# Patient Record
Sex: MALE | Race: WHITE | Employment: OTHER | URBAN - NONMETROPOLITAN AREA
[De-identification: names, ages, dates, MRNs, and addresses within clinical notes are randomized per-mention and may not be internally consistent; named-entity substitution may affect disease eponyms.]

---

## 2022-04-08 ENCOUNTER — OFFICE VISIT (OUTPATIENT)
Dept: ORTHOPEDIC SURGERY | Age: 67
End: 2022-04-08
Payer: MEDICARE

## 2022-04-08 VITALS
DIASTOLIC BLOOD PRESSURE: 96 MMHG | HEIGHT: 68 IN | SYSTOLIC BLOOD PRESSURE: 166 MMHG | WEIGHT: 255 LBS | BODY MASS INDEX: 38.65 KG/M2

## 2022-04-08 DIAGNOSIS — M25.562 LEFT KNEE PAIN, UNSPECIFIED CHRONICITY: ICD-10-CM

## 2022-04-08 DIAGNOSIS — M17.12 PRIMARY OSTEOARTHRITIS OF LEFT KNEE: Primary | ICD-10-CM

## 2022-04-08 PROCEDURE — 99203 OFFICE O/P NEW LOW 30 MIN: CPT | Performed by: PHYSICIAN ASSISTANT

## 2022-04-08 RX ORDER — AMLODIPINE BESYLATE 10 MG/1
10 TABLET ORAL DAILY
COMMUNITY
Start: 2022-01-06

## 2022-04-08 RX ORDER — CELECOXIB 200 MG/1
200 CAPSULE ORAL 2 TIMES DAILY PRN
Qty: 60 CAPSULE | Refills: 0 | Status: SHIPPED
Start: 2022-04-08 | End: 2022-05-05

## 2022-04-08 RX ORDER — OMEPRAZOLE 20 MG/1
20 CAPSULE, DELAYED RELEASE ORAL DAILY
COMMUNITY
End: 2022-10-17

## 2022-04-08 RX ORDER — VENLAFAXINE HYDROCHLORIDE 150 MG/1
150 CAPSULE, EXTENDED RELEASE ORAL DAILY
COMMUNITY
Start: 2022-02-18

## 2022-04-08 NOTE — PROGRESS NOTES
[address] [facility],[address] In Care  New Patient Note      CHIEF COMPLAINT:   Chief Complaint   Patient presents with    Leg Pain     Pt presents this PM with lateral leg pain that wraps anteriorly, as well as calf pain. States that pain is constant, but does not extend all the way to the ankle. Describes pain as \"constant shin splint\". HISTORY OF PRESENT ILLNESS:                The patient is a 77 y.o. male who presents today with with left knee pain that started 2 weeks ago upon waking. He denies any new activities or injuries prior to the start of the symptoms. He localizes pain to the lateral knee, shin, lower leg. He states his pain is worse when he goes from sit to stand, stairs, and upon waking in the morning. He states once he gets out of bed and starts walking the pain starts and does not let up throughout the day. Denies any numbness or tingling or loss of sensation in the lower leg. He denies calf pain. He has a history of a right total knee replacement and admits this is how the right knee felt prior to his replacement. He has not tried any home therapies of over-the-counter medications, ice, or had any formal physical therapy or injections for this knee. In the past he has tried Mobic, as well as over-the-counter anti-inflammatories which upset his stomach. Past Medical History:        Diagnosis Date    Arthritis     Osteoarthritis      Past Surgical History:        Procedure Laterality Date    JOINT REPLACEMENT      R TKA    SHOULDER SURGERY       Current Medications:   No current facility-administered medications for this visit. Allergies:  Patient has no known allergies. Social History:   TOBACCO:   reports that he has never smoked. He has never used smokeless tobacco.  ETOH:   reports previous alcohol use. DRUGS:   reports no history of drug use.   OCCUPATION: retired    Review of Systems   Constitutional: Negative for fever, chills, diaphoresis, appetite change and fatigue. HENT: Negative for dental issues, hearing loss and tinnitus. Negative for congestion, sinus pressure, sneezing, sore throat. Negative for headache. Eyes: Negative for visual disturbance, blurred and double vision. Negative for pain, discharge, redness and itching  Respiratory: Negative for cough, shortness of breath and wheezing. Cardiovascular: Negative for chest pain, palpitations and leg swelling. No dyspnea on exertion   Gastrointestinal:   Negative for nausea, vomiting, abdominal pain, diarrhea, constipation  or black or bloody. Hematologic\Lymphatic:  negative for bleeding, petechiae,   Genitourinary: Negative for hematuria and difficulty urinating. Musculoskeletal: Negative for neck pain and stiffness. Negative for back pain, joint swelling. + Right knee pain, antalgic gait  Skin: Negative for pallor, rash and wound. Neurological: Negative for dizziness, tremors, seizures, weakness, light-headedness, no TIA or stroke symptoms. No numbness and headaches. Psychiatric/Behavioral: Negative.      Physical Examination:   General appearance: alert, well appearing, and in no distress,  normal appearing weight  Mental status: alert, oriented to person, place, and time, normal mood, behavior, speech, dress, motor activity, and thought processes  Resp:   resp easy and unlabored, no audible wheezes note  Cardiac: distal pulses palpable, skin well perfused  Neurological: alert, oriented X3, normal speech, no focal findings or movement disorder noted, motor and sensory grossly normal bilaterally, normal muscle tone  HEENT: normochephalic atraumatic, external ears and eyes normal, sclera normal, neck supple  Extremities:   peripheral pulses normal, no edema, redness or tenderness in the calves   Skin: normal coloration, no rashes or open wounds, no suspicious skin lesions noted  Psych: Affect euthymic   Musculoskeletal:   On visual inspection there is no obvious deformity of the right knee, edema, erythema, ecchymosis. He is tender to palpation over the lateral joint line, the fibular head. No tenderness to palpation on the medial aspect of the knee, patella tendon, or calf. There is no decreased sensation with light touch throughout the right lower extremity. Patient is grossly neurovascularly intact. Left knee: Active range of motion flexion 90 with pain, extension 5 with pain. Manual muscle testing 5/5 with extension/flexion. (-) anterior/posterior drawer, (-) Lachman, (-) varus/valgus stress test, (+) Octavio, (-) Vazquez, (-) Homans' sign    BP (!) 166/96   Ht 5' 8\" (1.727 m)   Wt 255 lb (115.7 kg) Comment: per patient  BMI 38.77 kg/m²      XR: 4 view left knee x-rays were obtained in the clinic today which show tricompartment osteoarthritis of the left knee    The imaging will be reviewed and interpreted by Radiologist.  The report was not complete at the time of this note. Please refer to Radiologist report for their interpretation. ASSESSMENT:   Diagnosis Orders   1. Left knee pain, unspecified chronicity  XR KNEE LEFT (MIN 4 VIEWS)       PLAN:  This is a 70-year-old male who presents to the office today for evaluation of left knee pain that began 2 weeks ago and has been progressively worsening. On exam he is tender to palpation along the lateral joint line and has positive Octavio. We did obtain 4 view knee x-rays in the office today which show tricompartment osteoarthritis. We discussed treatment options for this including over-the-counter medications, injections, activity modifications, physical therapy. Ultimately at some point he will need a left total knee replacement. He would like to try medication management and a home exercise program at this time.   I recommended Celebrex 200 mg 1 tablet up to twice daily as needed for pain with GI precautions as he has had GI problems with meloxicam as well as over-the-counter anti-inflammatories in the past..  I did advise him while he is on the Celebrex he should not use any other nonsteroidal anti-inflammatory. He can use Tylenol as needed for discomfort in addition to the Celebrex. If he develops any GI upset, nausea, vomiting, change in appetite, blood in stools he will discontinue the medication immediately and contact the office. I also provided him with a home exercise program today for knee arthritis. He has had cortisone injections in the right knee prior to his replacement which did help. He will call our office if he wants to proceed with an intra-articular cortisone injection. Patient voiced understanding and agrees with the treatment plan outlined for him in the office today. He will call us with any additional questions or concerns he may have. We will plan to see him back as needed for the left knee. Electronically signed by Zi Asencio PA-C on 4/8/22 at 2:41 PM EDT    **This report was transcribed using voice recognition software. Every effort was made to ensure accuracy; however, inadvertent computerized transcription errors may be present. **

## 2022-04-08 NOTE — PATIENT INSTRUCTIONS
Patient Education        Knee Arthritis: Exercises  Introduction  Here are some examples of exercises for you to try. The exercises may be suggested for a condition or for rehabilitation. Start each exercise slowly. Ease off the exercises if you start to have pain. You will be told when to start these exercises and which ones will work bestfor you. How to do the exercises  Knee flexion with heel slide    1. Lie on your back with your knees bent. 2. Slide your heel back by bending your affected knee as far as you can. Then hook your other foot around your ankle to help pull your heel even farther back. 3. Hold for about 6 seconds, then rest for up to 10 seconds. 4. Repeat 8 to 12 times. 5. Switch legs and repeat steps 1 through 4, even if only one knee is sore. Quad sets    1. Sit with your affected leg straight and supported on the floor or a firm bed. Place a small, rolled-up towel under your knee. Your other leg should be bent, with that foot flat on the floor. 2. Tighten the thigh muscles of your affected leg by pressing the back of your knee down into the towel. 3. Hold for about 6 seconds, then rest for up to 10 seconds. 4. Repeat 8 to 12 times. 5. Switch legs and repeat steps 1 through 4, even if only one knee is sore. Straight-leg raises to the front    1. Lie on your back with your good knee bent so that your foot rests flat on the floor. Your affected leg should be straight. Make sure that your low back has a normal curve. You should be able to slip your hand in between the floor and the small of your back, with your palm touching the floor and your back touching the back of your hand. 2. Tighten the thigh muscles in your affected leg by pressing the back of your knee flat down to the floor. Hold your knee straight. 3. Keeping the thigh muscles tight and your leg straight, lift your affected leg up so that your heel is about 12 inches off the floor.  Hold for about 6 seconds, then lower slowly. 4. Relax for up to 10 seconds between repetitions. 5. Repeat 8 to 12 times. 6. Switch legs and repeat steps 1 through 5, even if only one knee is sore. Active knee flexion    1. Lie on your stomach with your knees straight. If your kneecap is uncomfortable, roll up a washcloth and put it under your leg just above your kneecap. 2. Lift the foot of your affected leg by bending the knee so that you bring the foot up toward your buttock. If this motion hurts, try it without bending your knee quite as far. This may help you avoid any painful motion. 3. Slowly move your leg up and down. 4. Repeat 8 to 12 times. 5. Switch legs and repeat steps 1 through 4, even if only one knee is sore. Quadriceps stretch (facedown)    1. Lie flat on your stomach, and rest your face on the floor. 2. Wrap a towel or belt strap around the lower part of your affected leg. Then use the towel or belt strap to slowly pull your heel toward your buttock until you feel a stretch. 3. Hold for about 15 to 30 seconds, then relax your leg against the towel or belt strap. 4. Repeat 2 to 4 times. 5. Switch legs and repeat steps 1 through 4, even if only one knee is sore. Stationary exercise bike    1. If you do not have a stationary exercise bike at home, you can find one to ride at your local health club or community center. 2. Adjust the height of the bike seat so that your knee is slightly bent when your leg is extended downward. If your knee hurts when the pedal reaches the top, you can raise the seat so that your knee does not bend as much. 3. Start slowly. At first, try to do 5 to 10 minutes of cycling with little to no resistance. Then increase your time and the resistance bit by bit until you can do 20 to 30 minutes without pain. 4. If you start to have pain, rest your knee until your pain gets back to the level that is normal for you. Or cycle for less time or with less effort.   Follow-up care is a key part of your treatment and safety. Be sure to make and go to all appointments, and call your doctor if you are having problems. It's also a good idea to know your test results and keep alist of the medicines you take. Where can you learn more? Go to https://ruddy.[employer]. org and sign in to your [employer] account. Enter C159 in the [employer] box to learn more about \"Knee Arthritis: Exercises. \"     If you do not have an account, please click on the \"Sign Up Now\" link. Current as of: July 1, 2021               Content Version: 13.2  © 2006-2022 Healthwise, Incorporated. Care instructions adapted under license by [facility] ([facility]). If you have questions about a medical condition or this instruction, always ask your healthcare professional. [address] any warranty or liability for your use of this information.

## 2022-04-26 ENCOUNTER — TELEPHONE (OUTPATIENT)
Dept: ORTHOPEDIC SURGERY | Age: 67
End: 2022-04-26

## 2022-04-26 ENCOUNTER — OFFICE VISIT (OUTPATIENT)
Dept: ORTHOPEDIC SURGERY | Age: 67
End: 2022-04-26
Payer: MEDICARE

## 2022-04-26 VITALS
WEIGHT: 255 LBS | BODY MASS INDEX: 38.65 KG/M2 | HEIGHT: 68 IN | DIASTOLIC BLOOD PRESSURE: 90 MMHG | SYSTOLIC BLOOD PRESSURE: 172 MMHG

## 2022-04-26 DIAGNOSIS — M25.521 PAIN OF BOTH ELBOWS: ICD-10-CM

## 2022-04-26 DIAGNOSIS — M54.12 CERVICAL RADICULOPATHY: ICD-10-CM

## 2022-04-26 DIAGNOSIS — M17.12 PRIMARY OSTEOARTHRITIS OF LEFT KNEE: Primary | ICD-10-CM

## 2022-04-26 DIAGNOSIS — M25.522 PAIN OF BOTH ELBOWS: ICD-10-CM

## 2022-04-26 PROCEDURE — 20610 DRAIN/INJ JOINT/BURSA W/O US: CPT | Performed by: PHYSICIAN ASSISTANT

## 2022-04-26 PROCEDURE — 99213 OFFICE O/P EST LOW 20 MIN: CPT | Performed by: PHYSICIAN ASSISTANT

## 2022-04-26 RX ORDER — LIDOCAINE HYDROCHLORIDE 10 MG/ML
5 INJECTION, SOLUTION INFILTRATION; PERINEURAL ONCE
Status: COMPLETED | OUTPATIENT
Start: 2022-04-26 | End: 2022-04-26

## 2022-04-26 RX ORDER — TRIAMCINOLONE ACETONIDE 40 MG/ML
40 INJECTION, SUSPENSION INTRA-ARTICULAR; INTRAMUSCULAR ONCE
Status: COMPLETED | OUTPATIENT
Start: 2022-04-26 | End: 2022-04-26

## 2022-04-26 RX ADMIN — LIDOCAINE HYDROCHLORIDE 5 ML: 10 INJECTION, SOLUTION INFILTRATION; PERINEURAL at 15:10

## 2022-04-26 RX ADMIN — TRIAMCINOLONE ACETONIDE 40 MG: 40 INJECTION, SUSPENSION INTRA-ARTICULAR; INTRAMUSCULAR at 15:12

## 2022-04-26 NOTE — PATIENT INSTRUCTIONS
Patient Education        Joint Injections: Care Instructions  Your Care Instructions     Joint injections are shots into a joint, such as the knee. They may be used toput in medicines, such as pain relievers. A corticosteroid, or steroid, shot is used to reduce inflammation in tendons or joints. It is often used to treat problems such as arthritis, tendinitis, andbursitis. Steroids can be injected directly into a painful, inflamed joint. They can also help reduce inflammation of a bursa. A bursa is a sac of fluid. It cushions and lubricates areas where tendons, ligaments, skin, muscles, or bones rub againsteach other. A steroid shot can sometimes help with short-term pain relief when other treatments haven't worked. If steroid shots help, pain may improve for weeks ormonths. Follow-up care is a key part of your treatment and safety. Be sure to make and go to all appointments, and call your doctor if you are having problems. It's also a good idea to know your test results and keep alist of the medicines you take. How can you care for yourself at home?  Put ice or a cold pack on the area for 10 to 20 minutes at a time. Put a thin cloth between the ice and your skin.  Ask your doctor if you can take an over-the-counter pain medicine, such as acetaminophen (Tylenol), ibuprofen (Advil, Motrin), or naproxen (Aleve). Be safe with medicines. Read and follow all instructions on the label.  Avoid strenuous activities for several days. In particular, avoid ones that put stress on the area where you got the shot.  If you have dressings over the area, keep them clean and dry. You may remove them when your doctor tells you to. When should you call for help? Call your doctor now or seek immediate medical care if:     You have signs of infection, such as:  ? Increased pain, swelling, warmth, or redness. ? Red streaks leading from the site. ? Pus draining from the site. ? A fever.    Watch closely for changes in your health, and be sure to contact your doctor ifyou have any problems. Where can you learn more? Go to https://chpepiceweb.[employer]. org and sign in to your inDinero account. Enter N616 in the [employer] box to learn more about \"Joint Injections: Care Instructions. \"     If you do not have an account, please click on the \"Sign Up Now\" link. Current as of: July 1, 2021               Content Version: 13.2  © 2006-2022 Healthwise, Incorporated. Care instructions adapted under license by [facility] [address] If you have questions about a medical condition or this instruction, always ask your healthcare professional. [address] any warranty or liability for your use of this information.

## 2022-04-26 NOTE — PROGRESS NOTES
Established Patient Follow Up  [address] [facility]    Chief Complaint   Patient presents with    Knee Pain     The pt presents this PM with L knee pain that he was seen in the office for a few weeks ago. States that L knee pain is persisting. Had been taking Celebrex, but it was hard on his stomach. As a result, he states that he stopped taking it. Pt is hoping to receive an injection in the knee today. Subjective:  Pt is a 77 y.o. male, established pt who presents today for follow up of left knee pain. Please refer to the last clinic note from 4/8/22 as none of the patient's past medical hx has changed. Pt reports he has been consistent with the celebrex that was prescribed at the last visit and has not noticed any change in symptoms. He continues to have left knee pain. We did discuss possible intra-articular cortisone injection at his last visit if the celebrex did not help. He would like to proceed with the injection today. He states he was at his PCP in [facility], yesterday for his annual physical.  He has had trouble with bilateral UE numbness for several years as well as elbow pain. His PCP recommended EMG for the numbness in the UE and PT for the bilateral elbow pain. His PCP requests that we put these referrals in as since he is out of the area. Objective:  Vitals:    04/26/22 1441   BP: (!) 172/90     Pt is alert and oriented x 3, NAD, sitting comfortable in the exam room today. TTP over medial and lateral joint line, antalgic gait. He continues to have limited ROM that is painful. No decreased sensation to light touch, he is neurovascularly intact. Radiology Imaging:  Pt had imaging on 4/8/22 which showed severe narrowing of the medial compartment in the left knee    Procedure:  Procedure Note: Knee Cortisone injection     The left knees were identified as the injection site.  The risk and benefits of a cortisone injection were explained and the patient consented to the injection. Under sterile conditions, each knee was injected with a mixture of 40 mg of Kenalog and 4 mL of 1% Lidocaine without complication. A sterile bandage was applied. Assessment:  Encounter Diagnoses   Name Primary?  Primary osteoarthritis of left knee Yes    Cervical radiculopathy     Pain of both elbows      Plan:  Pt returns to the office today requesting left intra-articular knee cortisone injection as he has not had any improvement on Celebrex. We did discuss the risks and benefits of the injection and he provided verbal consent for the procedure. I did explain that we now try to minimize the amount of cortisone injections we perform in a joint due to increasing risk of infection at the time of surgical intervention. Additionally, I reminded him that most joint surgeons would require him to wait at least 3-4 months after the injection to proceed with any surgical intervention. Pt voiced understanding and agrees with the plan. Since the Celebrex has not been helpful and has been causing GI upset, I recommended d/c at this time. He can use tylenol 500mg 2 tab q 8 hours PRN pain. If he has lasting relief with this injection, I would be comfortable doing one more before considering something such as Visco supplementation vs. TKA. He will call us with any additional questions or concerns. I will see him back in 3 months for f/u. If his pain returns prior to that, he can call the office and we would refer him to one of the total joint surgeons. I am happy to put a referral in to Dr. Kenia Cardoso for consult for assessment for EMG. He would like to do PT at [facility] in [facility] so a referral will be placed for bilateral elbow pain and tendonitis. Pt voiced understanding and agrees with the treatment plan outlined in the office for him today. He will call with any additional questions or concerns. I will see him back in 3 months for re-evaluation.   If his knee pain resolves, he can call the office and postpone the visit. Electronically signed by Elizabeth Jackson PA-C on 4/26/22 at 2:39 PM EDT    **This report was transcribed using voice recognition software. Every effort was made to ensure accuracy; however, inadvertent computerized transcription errors may be present. **

## 2022-04-26 NOTE — TELEPHONE ENCOUNTER
Pt called in today to report that the he has been taking Celebrex for the last 2 weeks and it is not helping his knee pain and it is upsetting his stomach. I advised him to d/c the Celebrex at this time. I did offer him to come back into the clinic and we can do a IA cortisone injection in his knee if he would like. He would like to proceed with knee injection and will return to the clinic at his convenience.

## 2022-05-05 ENCOUNTER — TELEPHONE (OUTPATIENT)
Dept: ORTHOPEDIC SURGERY | Age: 67
End: 2022-05-05

## 2022-05-05 DIAGNOSIS — M17.12 PRIMARY OSTEOARTHRITIS OF LEFT KNEE: ICD-10-CM

## 2022-05-05 RX ORDER — CELECOXIB 200 MG/1
CAPSULE ORAL
Qty: 60 CAPSULE | Refills: 0 | Status: SHIPPED
Start: 2022-05-05 | End: 2022-07-18

## 2022-05-05 NOTE — TELEPHONE ENCOUNTER
Pt was contacted on 5/5/2022 to inform him that a refill for Celebrex was sent to his pharmacy. Pt states that he will not be needing a refill of the medication, due to symptoms of upset stomach after taking it. Did state that his L knee symptoms have improved a great deal since visiting the office for injection.

## 2022-05-10 ENCOUNTER — EVALUATION (OUTPATIENT)
Dept: PHYSICAL THERAPY | Age: 67
End: 2022-05-10

## 2022-05-10 DIAGNOSIS — M25.521 PAIN OF BOTH ELBOWS: Primary | ICD-10-CM

## 2022-05-10 DIAGNOSIS — M25.522 PAIN OF BOTH ELBOWS: Primary | ICD-10-CM

## 2022-05-10 NOTE — PROGRESS NOTES
[facility] [facility]   Phone: [phone]             Fax: [phone]         Date:  5/10/2022   Patient: Jerzy Smith  : 1955  MRN: [id number]  Referring Provider: Manuel Mcginnis PA-C  [address] Diagnosis:   M25.521, M25.522 (ICD-10-CM) - Pain of both elbows      SUBJECTIVE:     Onset date: a month     Onset[de-identified] Sudden onset    Mechanism of Injury / History: pt reports was doing yard work, using the [employer] and suddenly had pain in both elbows R worse than L. Reports hasn't gone away since. Pt reports pain doing yard work. Reports pain attempting to carry in groceries and reaching [place]. Patient is right handed. Previous PT: none    Medical Management for Current Problem: none    Chief complaint: pain    Behavior: condition is getting worse    Pain:   Current: 2/10     Best: 2/10     Worst:8/10    Aggravated by: reaching overhead, lifting/carrying/material handling    Relieved by: rest    Symptom Type/Quality: sharp  Location[de-identified] B elbows        Imaging results: Xr Elbow Right (min 3 Views)    Result Date: 2020  Reading Location: 200 Exam: XR ELBOW RIGHT (MIN 3 VIEWS). Comparison: None. History: Epicondylitis. Technique: 3 views of the right elbow. Findings: There is no fracture or dislocation. No elevation of the anterior or posterior fat pad is identified to suggest occult fracture. No soft tissue abnormality. No fracture, dislocation or joint effusion.       Past Medical History:  Past Medical History:   Diagnosis Date    Encounter for screening colonoscopy 2017     Past Surgical History:   Procedure Laterality Date    COLONOSCOPY  2017       Medications:   Current Outpatient Medications   Medication Sig Dispense Refill    fluticasone-vilanterol (BREO ELLIPTA) 100-25 MCG/INH AEPB inhaler Inhale 1 puff into the lungs daily 30 each 2    albuterol sulfate  (90 Base) MCG/ACT inhaler Inhale 2 puffs into the lungs 4 times daily as needed for Wheezing 3 Inhaler 1    azelastine (ASTELIN) 0.1 % nasal spray 1 spray by Nasal route 2 times daily Use in each nostril as directed 2 Bottle 1    montelukast (SINGULAIR) 10 MG tablet Take 1 tablet by mouth nightly 30 tablet 3     No current facility-administered medications for this visit. Occupation: retired. Exercise regimen: light  weight training  before injury but not recently.      Hobbies: none    Patient Goals: pain relief    Precautions/Contraindications: none    OBJECTIVE:     Observations: well nourished male                  Palpation: Tender to palpation near medial epicondyle on L and R , Non-tender to palpation on lateral epicondyle      Joint/Motion:  Right Elbow:  AROM: 100° flexion, extension 0°  Right Wrist:   AROM: extension: 60°, flexion 60°, ulnar deviation: 15°, radial deviation: 12°    Left ELbow:  AROM: 120° flexion, extension 0°  Left Wrist:   AROM: extension: 75°, flexion 60°, ulnar deviation: 30°, radial deviation: 25°      Strength:  Right Elbow:Flexion 4-/5,  Extension 4/5  Right wrist: Flexion 4/5,  Extension4/5, radial deviation 4/5, ulnar deviation 4/5  Right limited by pain     Left Elbow:Flexion 4+/5,  Extension 4+/5  Left wrist: Flexion 5/5,  Extension5/5, radial deviation 5/5, ulnar deviation 5/5          Special Tests/Functional Screens:    [x] Resisted wrist flexion  R: +    L: +  [x] Resisted wrist extension R: +   L:-   [x] Passive wrist extension R:+   L:-  [x] Passive wrist flexion   R: -    L: -     Spurlings:  Negative     Special Tests:  pt demonstrates signs and symptoms of B epicondylitis     ASSESSMENT     Outcome Measure:   QuickDASH (Disorders of the Arm, Shoulder, and Hand) 22.73% disability    Problems:    Pain reported 2-8 /10   ROM decreased   Strength decreased   Decreased functional ability with reaching, lifting, carrying    Reason for Skilled Care: Pt presents to therapy due to pain in B elbows affecting daily activities. Pt will benefit from skilled PT to improve pain, ROM, strength and mobility. [x] There are no barriers affecting plan of care or recovery    [] Barriers to this patient's plan of care or recovery include. Domestic Concerns:  [x] No  [] Yes:    Short Term goals (2-3 weeks)   Decrease reported pain to 0-5/10   Increase ROM to elbow AROM: 115° flexion, extension 0°, wrist AROM: extension: 65°, flexion 65°, ulnar deviation: 20°, radial deviation: 15°   Increase Strength to 4+/5     Able to perform/complete the following functions/tasks: pt able to complete yard work 20+ minutes with min pain/limitation. Pt able to reach [place] 5x with min pain/limitation. Pt able to carry 5+ pounds at waist height for 50 ft with min pain/limitation.  QuickDASH (Disorders of the Arm, Shoulder, and Hand) 15% disability    Long Term goals (4-6 weeks)   Decrease reported pain to 0-3/10   Increase ROM to AROM: 125° flexion, extension 0°, wrist AROM: extension: 70°, flexion 70°, ulnar deviation: 30°, radial deviation: 20°   Increase Strength to 5/5    Able to perform/complete the following functions/tasks: pt able to complete yard work 40+ minutes with no pain/limitation. Pt able to reach [place] 10 x with no pain/limitation. Pt able to carry 10+ pounds at waist height for 100 ft with no pain/limitation.     QuickDASH 12% disability   Independent with Home Exercise Programs    Rehab Potential: [x] Good  [] Fair  [] Poor    PLAN       Treatment Plan:   [x] Therapeutic Exercise  [x] Therapeutic Activity  [x] Neuromuscular Re-education   [] Gait Training  [] Balance Training  [] Aerobic conditioning  [x] Manual Therapy  [x] Massage/Fascial release   [] Work/Sport specific activities    [] Pain Neuroscience [x] Cold/hotpack  [] Vasocompression  [x] Electrical Stimulation  [] Lumbar/Cervical Traction  [x] Ultrasound   [] Iontophoresis: 4 mg/mL Dexamethasone Sodium Phosphate 40-80 mAmin  [x] Dry Needling      [x] Instruction in HEP      []  Medication allergies reviewed for use of Dexamethasone Sodium Phosphate 4mg/ml  with iontophoresis treatments. Patient is not allergic. The following CPT codes are likely to be used in the care of this patient: 455 1011 PT Evaluation: Low Complexity   23890 PT Re-Evaluation   [address] [employer] Neuromuscular Re-Education   08330 Therapeutic Activities   44646 Manual Therapy    Electrical Stimulation  98693 US      Suggested Professional Referral: [x] No  [] Yes:     Patient Education:  [x] Plans/Goals, Risks/Benefits discussed  [x] Home exercise program  Method of Education: [x] Verbal  [x] Demo  [x] Written  Comprehension of Education:  [x] Verbalizes understanding. [x] Demonstrates understanding. [] Needs Review. [] Demonstrates/verbalizes understanding of HEP/Ed previously given. Frequency:  2 days per week for 4-6 weeks    Patient understands diagnosis/prognosis and consents to treatment, plan and goals: [x] Yes    [] No     Thank you for the opportunity to work with your patient. If you have questions or comments, please contact me at numbers listed above. Electronically signed by: Sheila Payne PT DPT [id number]    Medicare Patients Only     Please sign Physician's Certification and return to: [address] [place] [address] E  [facility] [address]  Dept: [phone]  Dept Fax: [phone] Certification / Comments     Frequency/Duration 2 days per week for 4-6 weeks. Certification period from 5/10/2022  to 6/24/2022. I have reviewed the Plan of Care established for skilled therapy services and certify that the services are required and that they will be provided while the patient is under my care.     Physician's Comments/Revisions:               Physician's Printed Name:                                           [de-identified] Signature: Date:

## 2022-05-23 ENCOUNTER — TELEPHONE (OUTPATIENT)
Dept: PHYSICAL THERAPY | Age: 67
End: 2022-05-23

## 2022-05-24 ENCOUNTER — TELEPHONE (OUTPATIENT)
Dept: ORTHOPEDIC SURGERY | Age: 67
End: 2022-05-24

## 2022-05-24 DIAGNOSIS — M17.12 PRIMARY OSTEOARTHRITIS OF LEFT KNEE: Primary | ICD-10-CM

## 2022-05-24 NOTE — TELEPHONE ENCOUNTER
Patient called the office today stating his knee pain is not improving it is worsening. We have tried oral medications, home exercise program and intra-articular cortisone injection to his left knee. He does have left knee osteoarthritis most prevalent in the medial joint space. I told patient at this point I think our next step would be to refer him to one of the total joint surgeons to discuss definitive treatment options of surgery versus viscosupplementation. Patient voiced understanding and would like to proceed. Referral has been placed for Dr. Terry Villarreal.

## 2022-06-01 ENCOUNTER — TREATMENT (OUTPATIENT)
Dept: PHYSICAL THERAPY | Age: 67
End: 2022-06-01
Payer: MEDICARE

## 2022-06-01 ENCOUNTER — OFFICE VISIT (OUTPATIENT)
Dept: ORTHOPEDIC SURGERY | Age: 67
End: 2022-06-01
Payer: MEDICARE

## 2022-06-01 VITALS — HEIGHT: 68 IN | BODY MASS INDEX: 39.4 KG/M2 | WEIGHT: 260 LBS

## 2022-06-01 DIAGNOSIS — M25.522 PAIN OF BOTH ELBOWS: Primary | ICD-10-CM

## 2022-06-01 DIAGNOSIS — M25.521 PAIN OF BOTH ELBOWS: Primary | ICD-10-CM

## 2022-06-01 DIAGNOSIS — M17.12 OSTEOARTHRITIS OF LEFT KNEE, UNSPECIFIED OSTEOARTHRITIS TYPE: Primary | ICD-10-CM

## 2022-06-01 PROCEDURE — 99203 OFFICE O/P NEW LOW 30 MIN: CPT | Performed by: ORTHOPAEDIC SURGERY

## 2022-06-01 PROCEDURE — 97140 MANUAL THERAPY 1/> REGIONS: CPT | Performed by: PHYSICAL THERAPIST

## 2022-06-01 PROCEDURE — 97110 THERAPEUTIC EXERCISES: CPT | Performed by: PHYSICAL THERAPIST

## 2022-06-01 PROCEDURE — 1123F ACP DISCUSS/DSCN MKR DOCD: CPT | Performed by: ORTHOPAEDIC SURGERY

## 2022-06-01 NOTE — PROGRESS NOTES
New Knee Patient     Referring Provider:   Ashley Blair PA-C  [address],  [facility]    CHIEF COMPLAINT:   Chief Complaint   Patient presents with    New Patient     L Knee OA    Knee Pain     L Knee pain. . Cortisone injection from [facility] on 4/26/22 with no relief. . Had been taking Celebrex, but it was hard on his stomach. As a result, he states that he stopped taking it. HPI:    Stephane Salter is a 77y.o. year old male who is seen today  for evaluation of left knee pain. He reports the pain has been ongoing for the past 3 months. Patient denies injury at onset of symptoms. He was initially seen in [facility] walk-in care where he was provided a cortisone injection. He reports no relief of symptoms with injection or with OTC medications. He has underwent successful right total knee arthroplasty 7 years ago while living in [place]. Given failure of conservative treatment to this point he is interested in discussing surgical management in the form of a left knee arthroplasty. He reports some feelings of instability. Denies mechanical symptoms. Reports reports pain is worse with ambulation better with rest.  He is currently not working, he is retired. PAST MEDICAL HISTORY  Past Medical History:   Diagnosis Date    Arthritis     Osteoarthritis        PAST SURGICAL HISTORY  Past Surgical History:   Procedure Laterality Date    JOINT REPLACEMENT      R TKA    SHOULDER SURGERY           FAMILY HISTORY   History reviewed. No pertinent family history.     SOCIAL HISTORY  Social History     Socioeconomic History    Marital status:      Spouse name: Not on file    Number of children: Not on file    Years of education: Not on file    Highest education level: Not on file   Occupational History    Not on file   Tobacco Use    Smoking status: Never Smoker    Smokeless tobacco: Never Used   Substance and Sexual Activity    Alcohol use: Not Currently  Drug use: Never    Sexual activity: Not on file   Other Topics Concern    Not on file   Social History Narrative    Not on file     Social Determinants of Health     Financial Resource Strain:     Difficulty of Paying Living Expenses: Not on file   Food Insecurity:     Worried About Running Out of Food in the Last Year: Not on file    [place] of Food in the Last Year: Not on file   Transportation Needs:     Lack of Transportation (Medical): Not on file    Lack of Transportation (Non-Medical):  Not on file   Physical Activity:     Days of Exercise per Week: Not on file    Minutes of Exercise per Session: Not on file   Stress:     Feeling of Stress : Not on file   Social Connections:     Frequency of Communication with Friends and Family: Not on file    Frequency of Social Gatherings with Friends and Family: Not on file    Attends Shinto Services: Not on file    Active Member of [address] [place] [address] [employer] or Organizations: Not on file    Attends Club or Organization Meetings: Not on file    Marital Status: Not on file   Intimate Partner Violence:     Fear of Current or Ex-Partner: Not on file    Emotionally Abused: Not on file    Physically Abused: Not on file    Sexually Abused: Not on file   Housing Stability:     Unable to Pay for Housing in the Last Year: Not on file    Number of [place] in the Last Year: Not on file    Unstable Housing in the Last Year: Not on file     Social History     Occupational History    Not on file   Tobacco Use    Smoking status: Never Smoker    Smokeless tobacco: Never Used   Substance and Sexual Activity    Alcohol use: Not Currently    Drug use: Never    Sexual activity: Not on file       CURRENT MEDICATIONS     Current Outpatient Medications:     celecoxib (CELEBREX) 200 MG capsule, TAKE 1 CAPSULE 2 TIMES DAILY AS NEEDED FOR PAIN TAKE WITH FOOD, DISCONTINUE IF GI UPSET OCCURS, Disp: 60 capsule, Rfl: 0    omeprazole (PRILOSEC) 20 MG delayed release capsule, Take 20 mg by mouth daily, Disp: , Rfl:     amLODIPine (NORVASC) 10 MG tablet, , Disp: , Rfl:     venlafaxine (EFFEXOR XR) 150 MG extended release capsule, , Disp: , Rfl:     ALLERGIES  No Known Allergies    Controlled Substances Monitoring:          REVIEW OF SYSTEMS:     Constitutional:  Negative for weight loss, fevers, chills, fatigue  Cardiovascular: Negative for chest pain, palpitations  Pulmonary: Negative for shortness of breath, labored breathing, cough  GI: negative for abdominal pain, nausea, vomitting   MSK: per HPI  Skin: negative for rash, open wounds    All other systems reviewed and are negative         PHYSICAL EXAM     Vitals:    06/01/22 1418   Weight: 260 lb (117.9 kg)   Height: 5' 8\" (1.727 m)       Height: 5' 8\" (1.727 m)  Weight: [unfilled]  BMI:  Body mass index is 39.53 kg/m². General: The patient is alert and oriented x 3, appears to be stated age and in no distress. HEENT: head is normocephalic, atraumatic. EOMI. Neck: supple, trachea midline, no thyromegaly   Cardiovascular: peripheral pulses palpable. Normal Capillary refill   Respiratory: breathing unlabored, chest expansion symmetric   Skin: no rash, no open wounds, no erythema  Psych: normal affect; mood stable  Neurologic: gait normal, sensation grossly intact in extremities  MSK:        Lower Extremity:   Ipsilateral hip exam shows normal range of motion without pain with impingement testing. Left knee exam range of motion 0120, there is posterior joint line tenderness with palpation. No swelling deformity or palpable effusion. Still patella tracked midline with mild crepitus. Stable valgus varus exams. Varus exam elicited medial joint line pain. IMAGING:    No new imaging was obtained today. Recent weightbearing x-rays of the left knee shows tricompartmental degenerative changes with bone-on-bone degenerative changes to the medial compartment.   Right TKA in good alignment         ASSESSMENT  Left knee bone-on-bone osteoarthritis    PLAN  Today we discussed his Left knee. He reports ongoing symptoms for over the last several months now. Symptoms have not improved with OTC medications or cortisone injection. Imaging reviewed with patient today showing bone-on-bone degenerative changes. Patient underwent successful right knee replacement surgery about 7 years ago. For these reasons he is interested in proceeding with surgical management specifically a left Aiden robotic assisted total knee arthroplasty. Given that he recently had his injection on April 26 will wait 3 months before proceeding. We will see patient back for preoperative appointment. Patient verbalized understanding. JANETTE Segura  Orthopedic Surgery   06/01/22  2:45 PM      Staff Addendum    I have seen and evaluated the patient and agree with the assessment and plan as documented by Aaron Hu CNP. I have performed the key components of the history and physical examination and concur with the findings and plan, and have made changes where appropriate/necessary.           Sia Jane MD  [address] [place] [address]

## 2022-06-01 NOTE — PROGRESS NOTES
[address] [facility]   Phone: [phone]   Fax: [phone]      Physical Therapy Daily Treatment Note    Date: 2022  Patient Name: Rasta Rdz  : 1955   MRN: [id number]  DOInjury: 1 month  DOSx: NA   Referring Provider: No referring provider defined for this encounter. Medical Diagnosis:   M25.521, M25.522 (ICD-10-CM) - Pain of both elbows    Outcome Measure:  Ian Duty 22.73%     S: pt reports L elbow feels a lot better but R feels about the same. Reports doing stretches everyday and ice massage as well. O: Pt given written HEP  Time 1325-2931     Visit -  Repeat outcome measure at mid point and end. Pain 4/10 R UE  0/10 L UE      ROM Right Elbow:  AROM: 100° flexion, extension 0°  Right Wrist:   AROM: extension: 60°, flexion 60°, ulnar deviation: 15°, radial deviation: 12°     Left ELbow:  AROM: 120° flexion, extension 0°  Left Wrist:   AROM: extension: 75°, flexion 60°, ulnar deviation: 30°, radial deviation: 25°     Modalities      Ice  5 min during ex's   5 minutes end of session     Manual      CFM  8 minutes  Right man         Stretch      Wrist extensor stretch 2 x 3 x 20s holds B  HEP te   Wrist flexor stretch  2 x 3 x 20s holds B  HEP te         Towel IR stretch      IR reaching behind back      Exercise      Shrugs AROM      Pendulum Ex      UBE      Pulleys - flex      Pulleys-IR      Supine wand chest press      Supine wand flex      Supine wand ER/IR      Supine flexion      S-lying ABD      S-lying ER      Standing wand flex      Standing flexion      Wrist flex/ ext      Wrist radial deviation/ ulnar deviation  X 10 R AROM te   Forearm pronation/supination  X 10 R AROM te   Standing ABD      Bicep curl  2 x 10 R AROM te   ROWS: H Functional activities  To aid in ROM and strength needed for reaching , lifting ,pushing and pulling at home/work    ROWS: M  \"    ROWS: L  \"    ER  \"    IR  \"                A:  Tolerated fairly well.   Instructed to continue with original HEP until next session. Pt demonstrates understanding.      P: Continue with rehab plan  Renata Giron, PT DPT, PT [id number]    Treatment Charges: Mins Units   Initial Evaluation     Re-Evaluation     Ther Exercise         TE 27 1   Manual Therapy     MT 8 1   Ther Activities        TA     Gait Training          GT     Neuro Re-education NR     Modalities     Non-Billable Service Time 5 0   Other     Total Time/Units 40 2

## 2022-06-01 NOTE — PATIENT INSTRUCTIONS
Surgery: LEFT KNEE LIA ROBOTIC ASSISTED TOTAL JOINT ARTHROPLASTY  Date: 7/28/2022  Location: [facility] Naheed Miller a call from the office once on the surgery schedule within the next 1-2 days. If you have any questions, please call [phone] and ask for [address] over your patient handout on what to expect about your upcoming surgery.     -Scheduled your medical clearance with Dr. Joo Blackmon     -Schedule your CT scan and PAT testing    -You will receive a phone call from the hospital within week of your date of surgery regarding instructions and arrival time.

## 2022-06-03 ENCOUNTER — TREATMENT (OUTPATIENT)
Dept: PHYSICAL THERAPY | Age: 67
End: 2022-06-03
Payer: MEDICARE

## 2022-06-03 DIAGNOSIS — M25.521 PAIN OF BOTH ELBOWS: Primary | ICD-10-CM

## 2022-06-03 DIAGNOSIS — M25.522 PAIN OF BOTH ELBOWS: Primary | ICD-10-CM

## 2022-06-03 PROCEDURE — 97110 THERAPEUTIC EXERCISES: CPT | Performed by: PHYSICAL THERAPIST

## 2022-06-03 PROCEDURE — 97140 MANUAL THERAPY 1/> REGIONS: CPT | Performed by: PHYSICAL THERAPIST

## 2022-06-03 NOTE — PROGRESS NOTES
[address] [facility]   Phone: [phone]   Fax: [phone]      Physical Therapy Daily Treatment Note    Date: 6/3/2022  Patient Name: Cirilo Ruiz  : 1955   MRN: [id number]  DOInjury: 1 month  DOSx: NA   Referring Provider: No referring provider defined for this encounter. Medical Diagnosis:   M25.521, M25.522 (ICD-10-CM) - Pain of both elbows    Outcome Measure:  Robbi Ortiz 22.73%     S: pt reports yesterday arm felt better than it does today. Pt reports L arm still feels good. Reports R arm /10. Pt reports didn't have pain after last visit. Pain started last night and into this morning. O: Pt given written HEP  Time 1115- 1152     Visit 3/8-12  Repeat outcome measure at mid point and end.     Pain 8/10 R UE  0/10 L UE      ROM Right Elbow:  AROM: 100° flexion, extension 0°  Right Wrist:   AROM: extension: 60°, flexion 60°, ulnar deviation: 15°, radial deviation: 12°     Left ELbow:  AROM: 120° flexion, extension 0°  Left Wrist:   AROM: extension: 75°, flexion 60°, ulnar deviation: 30°, radial deviation: 25°     Modalities      Ice  10 minutes      Manual      CFM  8 minutes  Right man         Stretch      Wrist extensor stretch 2 x 3 x 20s holds R  HEP te   Wrist flexor stretch  2 x 3 x 20s holds R  HEP te         Towel IR stretch      IR reaching behind back      Exercise      Shrugs AROM      Pendulum Ex      UBE      Pulleys - flex      Pulleys-IR      Supine wand chest press      Supine wand flex      Supine wand ER/IR      Supine flexion      S-lying ABD      S-lying ER      Standing wand flex      Standing flexion      Wrist flex/ ext X 10 R  AROM te   Wrist radial deviation/ ulnar deviation  X 10 R AROM te   Forearm pronation/supination  X 10 R AROM te   Standing ABD      Bicep curl  2 x 10 R AROM te   ROWS: H Functional activities  To aid in ROM and strength needed for reaching , lifting ,pushing and pulling at home/work    ROWS: M  \"    ROWS: L  \"    ER  \"

## 2022-06-06 ENCOUNTER — TELEPHONE (OUTPATIENT)
Dept: ORTHOPEDIC SURGERY | Age: 67
End: 2022-06-06

## 2022-06-06 NOTE — TELEPHONE ENCOUNTER
Spoke with [facility] M @ [facility] Pre-Cert regarding Lt TKA (LIA) 7/21/2022      Prior Authorization    Procedure:  Robotic Assisted Left Knee Total Joint Arthroplasty  Procedure Code: 17813  Diagnosis Code:  M17.12  Date:  7/21/2022  Facility:  [address] [place] [address]  Status: OPT  Physician:  Sandoval Ellis M.D. Auth Number: [id number]  Valid:  6/21/2022 - 8/19/2022  Contact: Rao RUSH  6/6/2022 8:55 a.m.

## 2022-06-08 ENCOUNTER — TREATMENT (OUTPATIENT)
Dept: PHYSICAL THERAPY | Age: 67
End: 2022-06-08
Payer: MEDICARE

## 2022-06-08 DIAGNOSIS — M25.521 PAIN OF BOTH ELBOWS: Primary | ICD-10-CM

## 2022-06-08 DIAGNOSIS — M25.522 PAIN OF BOTH ELBOWS: Primary | ICD-10-CM

## 2022-06-08 PROCEDURE — 97140 MANUAL THERAPY 1/> REGIONS: CPT | Performed by: PHYSICAL THERAPIST

## 2022-06-08 PROCEDURE — 97110 THERAPEUTIC EXERCISES: CPT | Performed by: PHYSICAL THERAPIST

## 2022-06-08 NOTE — PROGRESS NOTES
[address] [facility]   Phone: [phone]   Fax: [phone]      Physical Therapy Daily Treatment Note    Date: 2022  Patient Name: Camille Sanchez  : 1955   MRN: [id number]  DOInjury: 1 month  DOSx: NA   Referring Provider: No referring provider defined for this encounter. Medical Diagnosis:   M25.521, M25.522 (ICD-10-CM) - Pain of both elbows    Outcome Measure:  [facility] 22.73%     S: pt reports L arm still feels great, R arm 5-6/10 pain. Reports improvement since last visit. Pt brought in epicondylitis brace. Pt shown how to wear it. O: Pt given written HEP  Time 7315- 9653     Visit   Repeat outcome measure at mid point and end.     Pain 5-6 /10 R UE  0/10 L UE      ROM Right Elbow:  AROM: 100° flexion, extension 0°  Right Wrist:   AROM: extension: 60°, flexion 60°, ulnar deviation: 15°, radial deviation: 12°     Left ELbow:  AROM: 120° flexion, extension 0°  Left Wrist:   AROM: extension: 75°, flexion 60°, ulnar deviation: 30°, radial deviation: 25°     Modalities      Heat  5 minutes      US      Ice  10 minutes      Manual      CFM  8 minutes  Right man         Stretch      Wrist extensor stretch 2 x 3 x 20s holds R  HEP te   Wrist flexor stretch  2 x 3 x 20s holds R  HEP te         Towel IR stretch      IR reaching behind back      Exercise      Shrugs AROM      Pendulum Ex      UBE      Pulleys - flex      Pulleys-IR      Supine wand chest press      Supine wand flex      Supine wand ER/IR      Supine flexion      S-lying ABD      S-lying ER      Standing wand flex      Standing flexion      Wrist flex/ ext X 10 R  AROM te   Wrist radial deviation/ ulnar deviation  X 10 R AROM te   Forearm pronation/supination  X 10 R AROM te   Standing ABD      Bicep curl  2 x 10 R AROM te   ROWS: H Functional activities  To aid in ROM and strength needed for reaching , lifting ,pushing and pulling at home/work    ROWS: M  \"    ROWS: L  \"    ER  \"    IR  \" A:  Tolerated well. Pt instructed to continue with stretches and ice massage at home for HEP and if possible to apply moist heat prior to stretching approximately 5 minutes. Pt demonstrates understanding. Pt also educated on wear of brace.       P: Continue with rehab plan  Rubin Post, PT DPT, PT [id number]    Treatment Charges: Mins Units   Initial Evaluation     Re-Evaluation     Ther Exercise         TE 17 1   Manual Therapy     MT 8 1   Ther Activities        TA     Gait Training          GT     Neuro Re-education NR     Modalities     Non-Billable Service Time 15 0   Other     Total Time/Units 40 2

## 2022-06-15 ENCOUNTER — TREATMENT (OUTPATIENT)
Dept: PHYSICAL THERAPY | Age: 67
End: 2022-06-15
Payer: MEDICARE

## 2022-06-15 DIAGNOSIS — M25.521 PAIN OF BOTH ELBOWS: Primary | ICD-10-CM

## 2022-06-15 DIAGNOSIS — M25.522 PAIN OF BOTH ELBOWS: Primary | ICD-10-CM

## 2022-06-15 PROCEDURE — 97110 THERAPEUTIC EXERCISES: CPT | Performed by: PHYSICAL THERAPIST

## 2022-06-15 NOTE — PROGRESS NOTES
[address] [facility] and [facility]   Phone: [phone]   Fax: [phone]      Physical Therapy Daily Treatment Note    Date: 6/15/2022  Patient Name: Arjun Savage  : 1955   MRN: [id number]  DOInjury: 1 month  DOSx: NA   Referring Provider: No referring provider defined for this encounter. Medical Diagnosis:   M25.521, M25.522 (ICD-10-CM) - Pain of both elbows    Outcome Measure:  Sergio cMhugh 22.73%     S: pt reports L arm still feels great, R arm 5-6/10 pain. Reports improvement since last visit. Pt brought in epicondylitis brace. Pt shown how to wear it. O: Pt given written HEP  Time 1547- 1366     Visit -  Repeat outcome measure at mid point and end.     Pain 5-6 /10 R UE  0/10 L UE      ROM Right Elbow:  AROM: 100° flexion, extension 0°  Right Wrist:   AROM: extension: 60°, flexion 60°, ulnar deviation: 15°, radial deviation: 12°     Left ELbow:  AROM: 120° flexion, extension 0°  Left Wrist:   AROM: extension: 75°, flexion 60°, ulnar deviation: 30°, radial deviation: 25°     Modalities      Heat  5 minutes      US      Ice  10 minutes      Manual      CFM  8 minutes  Right man         Stretch      Wrist extensor stretch 2 x 3 x 20s holds R  HEP te   Wrist flexor stretch  2 x 3 x 20s holds R  HEP te         Towel IR stretch      IR reaching behind back      Exercise      Shrugs AROM      Pendulum Ex      UBE      Pulleys - flex      Pulleys-IR      Supine wand chest press      Supine wand flex      Supine wand ER/IR      Supine flexion      S-lying ABD      S-lying ER      Standing wand flex      Standing flexion      Wrist flex/ ext X 10 R  AROM te   Wrist radial deviation/ ulnar deviation  X 10 R AROM te   Forearm pronation/supination  X 10 R AROM te   Standing ABD      Bicep curl  2 x 10 R AROM te   ROWS: H Functional activities  To aid in ROM and strength needed for reaching , lifting ,pushing and pulling at home/work    ROWS: M  \"    ROWS: L  \"    ER  \"    IR  \" A:  Tolerated well. Pt instructed to continue with stretches and ice massage at home for HEP and if possible to apply moist heat prior to stretching approximately 5 minutes. Pt demonstrates understanding. Pt also educated on wear of brace.       P: Continue with rehab plan  Yolande Early, PT DPT, PT [id number]    Treatment Charges: Mins Units   Initial Evaluation     Re-Evaluation     Ther Exercise         TE 17 1   Manual Therapy     MT 8 1   Ther Activities        TA     Gait Training          GT     Neuro Re-education NR     Modalities     Non-Billable Service Time 15 0   Other     Total Time/Units 40 2

## 2022-06-15 NOTE — PROGRESS NOTES
[address] [facility] and [facility]   Phone: [phone]   Fax: [phone]      Physical Therapy Daily Treatment Note    Date: 6/15/2022  Patient Name: Rosalina Castillo  : 1955   MRN: [id number]  DOInjury: 1 month  DOSx: NA   Referring Provider: No referring provider defined for this encounter. Medical Diagnosis:   M25.521, M25.522 (ICD-10-CM) - Pain of both elbows    Outcome Measure:  Minesh Leiva 22.73%     S: pt reports L arm still feels great, R arm much improved last few days, currently 1/10. Pt reports compliant with HEP and wear of elbow brace. O: Pt given written HEP  Time 1115- 1150     Visit   Repeat outcome measure at mid point and end.     Pain 1 /10 R UE  0/10 L UE      ROM Right Elbow:  AROM: 100° flexion, extension 0°  Right Wrist:   AROM: extension: 60°, flexion 60°, ulnar deviation: 15°, radial deviation: 12°     Left ELbow:  AROM: 120° flexion, extension 0°  Left Wrist:   AROM: extension: 75°, flexion 60°, ulnar deviation: 30°, radial deviation: 25°     Modalities      Heat  5 minutes      US      Ice  10 minutes      Manual      CFM  5 minutes  Right man         Stretch      Wrist extensor stretch 2 x 3 x 20s holds R  HEP te   Wrist flexor stretch  2 x 3 x 20s holds R  HEP te         Towel IR stretch      IR reaching behind back      Exercise      Shrugs AROM      Pendulum Ex      UBE      Pulleys - flex      Pulleys-IR      Supine wand chest press      Supine wand flex      Supine wand ER/IR      Supine flexion      S-lying ABD      S-lying ER      Standing wand flex      Standing flexion      Wrist flex/ ext X 10 R  1# te   Wrist radial deviation/ ulnar deviation  X 10 R 1# te   Forearm pronation/supination  X 10 R 1# te   Standing ABD      Bicep curl   x 10 R 3# - 3 positions  te   ROWS: H Functional activities  To aid in ROM and strength needed for reaching , lifting ,pushing and pulling at home/work    ROWS: M  \"    ROWS: L  \"    ER  \"    IR  \"                A: Tolerated well. Pt reports continues to feel good end of session. Pt's next appointment is reassessment. After discussion, changed pt's next appointment from Friday to next week. Instructed pt to continue with HEP at home and wear of brace. Instructed to call for sooner appointment if needed/ if symptoms increase again. Otherwise will see pt next week. Pt in agreement.      P: Continue with rehab plan  Torey Warren, PT DPT, PT [id number]    Treatment Charges: Mins Units   Initial Evaluation     Re-Evaluation     Ther Exercise         TE 15 1   Manual Therapy     MT 5    Ther Activities        TA     Gait Training          GT     Neuro Re-education NR     Modalities     Non-Billable Service Time 15 0   Other     Total Time/Units 35 1

## 2022-06-21 ENCOUNTER — HOSPITAL ENCOUNTER (OUTPATIENT)
Dept: CT IMAGING | Age: 67
Discharge: HOME | End: 2022-06-23
Payer: MEDICARE

## 2022-06-21 DIAGNOSIS — M17.12 OSTEOARTHRITIS OF LEFT KNEE, UNSPECIFIED OSTEOARTHRITIS TYPE: ICD-10-CM

## 2022-06-21 PROCEDURE — 73700 CT LOWER EXTREMITY W/O DYE: CPT

## 2022-06-22 ENCOUNTER — TREATMENT (OUTPATIENT)
Dept: PHYSICAL THERAPY | Age: 67
End: 2022-06-22
Payer: MEDICARE

## 2022-06-22 DIAGNOSIS — M25.521 PAIN OF BOTH ELBOWS: Primary | ICD-10-CM

## 2022-06-22 DIAGNOSIS — M25.522 PAIN OF BOTH ELBOWS: Primary | ICD-10-CM

## 2022-06-22 PROCEDURE — 97164 PT RE-EVAL EST PLAN CARE: CPT | Performed by: PHYSICAL THERAPIST

## 2022-06-22 NOTE — PROGRESS NOTES
[address] [facility]   Phone: [phone]   Fax: [phone]        Referring Provider:   Stephanie Concepcion PA-C  [address]      Medical Diagnosis:     M25.521, B03.585 (ICD-10-CM) - Pain of both elbows    CERTIFICATION PERIOD:  5/10/2022  to 6/24/2022. ATTENDANCE:  Patient has attended 6 of 7 scheduled treatments from 5/10/2022   to 6/22/2022 . TREATMENTS RECEIVED:  Manual therapy, therapeutic exercise,     INITIAL STATUS:  Observations: well nourished male                                                       Palpation: Tender to palpation near medial epicondyle on L and R , Non-tender to palpation on lateral epicondyle       Joint/Motion:  Right Elbow:  AROM: 100° flexion, extension 0°  Right Wrist:   AROM: extension: 60°, flexion 60°, ulnar deviation: 15°, radial deviation: 12°     Left ELbow:  AROM: 120° flexion, extension 0°  Left Wrist:   AROM: extension: 75°, flexion 60°, ulnar deviation: 30°, radial deviation: 25°        Strength:  Right Elbow:Flexion 4-/5,  Extension 4/5  Right wrist: Flexion 4/5,  Extension4/5, radial deviation 4/5, ulnar deviation 4/5  Right limited by pain      Left Elbow:Flexion 4+/5,  Extension 4+/5  Left wrist: Flexion 5/5,  Extension5/5, radial deviation 5/5, ulnar deviation 5/5             Special Tests/Functional Screens:    [x]? Resisted wrist flexion     R: +     L: +  [x]? Resisted wrist extension R: +    L:-   [x]? Passive wrist extension R:+       L:-  [x]?  Passive wrist flexion       R: -      L: -      Spurlings:  Negative      Special Tests:             pt demonstrates signs and symptoms of B epicondylitis     CURRENT STATUS:  Observations: well nourished male                                      Palpation: non tender to palpation medial epicondyle      Joint/Motion:  Right Elbow:  AROM: 125° flexion, extension 0°  Right Wrist:   AROM: extension: 60°, flexion 60°, ulnar deviation: 25°, radial deviation: 25°     Left ELbow:  AROM: 122° flexion, extension 0°  Left Wrist:   AROM: extension: 70°, flexion 60°, ulnar deviation: 30°, radial deviation: 30°        Strength:  Right Elbow:Flexion 5/5,  Extension 5/5  Right wrist: Flexion 5/5,  Extension5/5, radial deviation 5/5, ulnar deviation 5/5      Left Elbow:Flexion 5/5,  Extension 5/5  Left wrist: Flexion 5/5,  Extension5/5, radial deviation 5/5, ulnar deviation 5/5             Special Tests/Functional Screens:    [x]? Resisted wrist flexion     R: -     L: -  [x]? Resisted wrist extension R: -     L:-   [x]? Passive wrist extension R:-     L:-  [x]? Passive wrist flexion       R: -      L: -               Short Term goals (2-3 weeks)  · Decrease reported pain to 0-5/10  (goal met)   · Increase ROM to elbow AROM: 115° flexion, extension 0°, wrist AROM: extension: 65°, flexion 65°, ulnar deviation: 20°, radial deviation: 15° (partial goal met)   · Increase Strength to 4+/5  (goal met)   · Able to perform/complete the following functions/tasks: pt able to complete yard work 20+ minutes with min pain/limitation. Pt able to reach [place] 5x with min pain/limitation. Pt able to carry 5+ pounds at waist height for 50 ft with min pain/limitation.  (goal met)   · QuickDASH (Disorders of the Arm, Shoulder, and Hand) 15% disability (goal met)      Long Term goals (4-6 weeks)  · Decrease reported pain to 0-3/10 (goal met)   · Increase ROM to AROM: 125° flexion, extension 0°, wrist AROM: extension: 70°, flexion 70°, ulnar deviation: 30°, radial deviation: 20° (partial goal met)   · Increase Strength to 5/5 (goal met)   · Able to perform/complete the following functions/tasks: pt able to complete yard work 40+ minutes with no pain/limitation. Pt able to reach [place] 10 x with no pain/limitation.   Pt able to carry 10+ pounds at waist height for 100 ft with no pain/limitation. (partial goal met)   · QuickDASH 12% disability (goal met)   · Independent with Home Exercise Programs (goal met)       OUTCOME MEASURE:   QuickDASH (Disorders of the Arm, Shoulder, and Hand) 11.36% disability    COMMENTS AND RECOMMENDATIONS:   Pt has made great progress in therapy. Pt reports no pain today and only gets sore if he works it a lot but no longer having constant pain. Pt reports didn't do yard work this week but last week did weed wacking and had minimal pain. Pt reports isn't struggling with any tasks at home due to either elbow. Pt reports continues to wear brace mostly with computer work. Pt also reports consistent with HEP at home. Discussed with pt recommend continue HEP at home and call with any questions. Pt demonstrates understanding. Will discharge pt at this time. Thank you for the opportunity to work with your patient. Clara Bailon, PT DPT [id number]    I CERTIFY THAT THE ABOVE REASSESSMENT AND PLAN OF CARE FOR PHYSICAL THERAPY SERVICES ARE APPROPRIATE AND MEDICALLY NECESSARY.       ________________________                _______________  Physician     Date

## 2022-06-22 NOTE — PROGRESS NOTES
[address] [facility] and [facility]   Phone: [phone]   Fax: [phone]      Physical Therapy Daily Treatment Note    Date: 2022  Patient Name: Ceasar Diana  : 1955   MRN: [id number]  DOInjury: 1 month  DOSx: NA   Referring Provider: No referring provider defined for this encounter. Medical Diagnosis:   M25.521, M25.522 (ICD-10-CM) - Pain of both elbows    Outcome Measure:  Alexi Jeff 11.36%     S: pt reports no pain today. Pt reports gets sore if he works it a lot but no constant pain anymore. O: Pt given written HEP  Time 2382 - 5248     Visit   Repeat outcome measure at mid point and end. Pain 0/10      ROM Joint/Motion:  Right Elbow:  AROM: 125° flexion, extension 0°  Right Wrist:   AROM: extension: 60°, flexion 60°, ulnar deviation: 25°, radial deviation: 25°     Left ELbow:  AROM: 122° flexion, extension 0°  Left Wrist:   AROM: extension: 70°, flexion 60°, ulnar deviation: 30°, radial deviation: 30°     Modalities      Heat      US     Ice      Manual     CFM  Right man        Stretch     Wrist extensor stretch HEP te   Wrist flexor stretch  HEP te        Towel IR stretch     IR reaching behind back     Exercise     Shrugs AROM     Pendulum Ex     UBE     Pulleys - flex     Pulleys-IR     Supine wand chest press     Supine wand flex     Supine wand ER/IR     Supine flexion     S-lying ABD     S-lying ER     Standing wand flex     Standing flexion     Wrist flex/ ext 1# te   Wrist radial deviation/ ulnar deviation  1# te   Forearm pronation/supination  1# te   Standing ABD     Bicep curl  3# - 3 positions  te   ROWS: H Functional activities  To aid in ROM and strength needed for reaching , lifting ,pushing and pulling at home/work    ROWS: M  \"    ROWS: L  \"    ER  \"    IR  \"                A:  Tolerated well. Reassessment completed today. See note for details. Recommend continue with HEP at home and call with any questions. P: Will discharge pt at this time. Michela Gutierrez, PT DPT, [address] [facility] [id number]    Treatment Charges: Mins Units   Initial Evaluation     Re-Evaluation 17 1   Ther Exercise         TE     Manual Therapy     MT     Ther Activities        TA     Gait Training          GT     Neuro Re-education NR     Modalities     Non-Billable Service Time     Other     Total Time/Units 17 1

## 2022-07-01 NOTE — DISCHARGE INSTRUCTIONS
DISCHARGE INSTRUCTIONS FOR TOTAL KNEE REPLACEMENT        · Prevent blood clots - you are at risk post operatively for DVT (Deep vein    thrombosis and / or PE (Pulmonary Embolism)       Continue antiembolic stockings (SOLIS hose) for 4 weeks from date of surgery. Remove stockings every eight hours. Check skin for redness or any breakdowns on heels and over outer ankle bones. Do not roll stockings down or fold over (this may cause a band of constriction    interfering with circulation and increasing your risk of a blood clot forming or a    skin breakdown. If you have not been wearing your stockings and notice increased swelling or    excessive swelling in your extremity then: Lie down and elevate legs for 20-30   minutes. Apply stockings. If swelling does not improve, call office. REMEMBER: Mild to moderate swelling of the operative limb is expected    for some time after surgery. Take frequent walks around  your home. Be careful outdoors- watch for uneven ground and pavement, curbs and holes. Gradually increase your activity to prevent fatigue. When at rest (sitting in a chair or lying down,resting) continue circulation exercises at least every hour - foot flaps, ankle rolls, quad and glut sets. You will continue one of the following blood thinners at home. Lovenox -  An injection once or twice a day if you have a history of blood clots,    cancer, stomach ulcers or gastrointestinal bleeding. Aspirin - 81 mg twice daily if no history of the above ailments. Coumadin - if held before surgery, your family doctor will be responsible for   managing and ordering this medication upon your discharge. · Incision Care: You may shower - Your dressing is water proof. You can shower with your dressing on. After one week, remove your dressing and leave your incision open to air. REMEMBER to let water roll over incision. Incision line is    healing and tender - protect from [address] water.  No need to wash incision with soap   and water. Pat incision dry with clean hand towel or let air dry. DO NOT take baths, go in swimming pools/hot tubs/lakes, or submerge your operative leg under water until you are cleared by Dr. Aster Gibson. Do not apply creams, liniments, lotions or ointments directly on incision line. If incision is draining, keep covered with sterile gauze. Change dressing daily and each time you shower. Do not touch incision line with your fingers or scratch incision with your   fingernails (your fingernails harbor germs and bacteria). Do not allow pets near your incision - do not allow pets to smell or lick incision. · Weight Bearing: You can be weight bearing as tolerated on your operative leg. Use a walker/cane as needed. · Signs and symptoms to report to your doctor:     Persistent drainage from the incision. Increased redness or swelling of the incision or operative extremity. Increased or excessive pain at the incision site or in the thigh or calf. Fever over 101 degrees with chills (take your temperature at home and   Record). · Go to Emergency Room if you experience any of the following:     Chest pain or tightness   Shortness of breath or difficulty breathing   Anxiety or feeling of impending doom      Note: The above are signs and symptoms of a blood clot in your    lungs. Although this is rare, it can occur. You must be evaluated in the   Emergency Room. · Rehabilitation:   Continue exercises at home as instructed by the Physical and Occupational   Therapists. Continue Hip / Knee Precautions until cleared by Dr. Radha Hernandez a formal Outpatient Physical Therapy program as soon as you are able.     · First post op visit will include:              Wound check               X-ray of operative joint              Exam by your surgeon               Prescription for Outpatient Physical Therapy    ·  Once you are home:   Call office for appointment at [phone] for one week.       Winston Santos MD  Orthopaedic Surgery

## 2022-07-02 ASSESSMENT — KOOS JR
BENDING TO THE FLOOR TO PICK UP OBJECT: 3
STRAIGHTENING KNEE FULLY: 2
TWISING OR PIVOTING ON KNEE: 3
GOING UP OR DOWN STAIRS: 2
STANDING UPRIGHT: 2
KOOS JR TOTAL INTERVAL SCORE: 42.281
HOW SEVERE IS YOUR KNEE STIFFNESS AFTER FIRST WAKING IN MORNING: 3
RISING FROM SITTING: 3

## 2022-07-02 ASSESSMENT — PROMIS GLOBAL HEALTH SCALE
IN GENERAL, PLEASE RATE HOW WELL YOU CARRY OUT YOUR USUAL SOCIAL ACTIVITIES (INCLUDES ACTIVITIES AT HOME, AT WORK, AND IN YOUR COMMUNITY, AND RESPONSIBILITIES AS A PARENT, CHILD, SPOUSE, EMPLOYEE, FRIEND, ETC) [ON A SCALE OF 1 (POOR) TO 5 (EXCELLENT)]?: 3
IN THE PAST 7 DAYS, HOW WOULD YOU RATE YOUR FATIGUE ON AVERAGE [ON A SCALE FROM 1 (NONE) TO 5 (VERY SEVERE)]?: 4
IN GENERAL, HOW WOULD YOU RATE YOUR MENTAL HEALTH, INCLUDING YOUR MOOD AND YOUR ABILITY TO THINK [ON A SCALE OF 1 (POOR) TO 5 (EXCELLENT)]?: 3
SUM OF RESPONSES TO QUESTIONS 2, 4, 5, & 10: 12
IN THE PAST 7 DAYS, HOW WOULD YOU RATE YOUR PAIN ON AVERAGE [ON A SCALE FROM 0 (NO PAIN) TO 10 (WORST IMAGINABLE PAIN)]?: 7
HOW IS THE PROMIS V1.1 BEING ADMINISTERED?: 2
IN GENERAL, WOULD YOU SAY YOUR QUALITY OF LIFE IS...[ON A SCALE OF 1 (POOR) TO 5 (EXCELLENT)]: 3
IN GENERAL, WOULD YOU SAY YOUR HEALTH IS...[ON A SCALE OF 1 (POOR) TO 5 (EXCELLENT)]: 3
IN THE PAST 7 DAYS, HOW OFTEN HAVE YOU BEEN BOTHERED BY EMOTIONAL PROBLEMS, SUCH AS FEELING ANXIOUS, DEPRESSED, OR IRRITABLE [ON A SCALE FROM 1 (NEVER) TO 5 (ALWAYS)]?: 2
TO WHAT EXTENT ARE YOU ABLE TO CARRY OUT YOUR EVERYDAY PHYSICAL ACTIVITIES SUCH AS WALKING, CLIMBING STAIRS, CARRYING GROCERIES, OR MOVING A CHAIR [ON A SCALE OF 1 (NOT AT ALL) TO 5 (COMPLETELY)]?: 3
WHO IS THE PERSON COMPLETING THE PROMIS V1.1 SURVEY?: 0
SUM OF RESPONSES TO QUESTIONS 3, 6, 7, & 8: 17
IN GENERAL, HOW WOULD YOU RATE YOUR PHYSICAL HEALTH [ON A SCALE OF 1 (POOR) TO 5 (EXCELLENT)]?: 3
IN GENERAL, HOW WOULD YOU RATE YOUR SATISFACTION WITH YOUR SOCIAL ACTIVITIES AND RELATIONSHIPS [ON A SCALE OF 1 (POOR) TO 5 (EXCELLENT)]?: 4

## 2022-07-07 NOTE — PROGRESS NOTES
breathing, good air exchange     Abdomen:  no scars, non-distended and non-tender    Extremities:  No clubbing, cyanosis, or edema    Musculoskeletal: Exam of the left knee shows varus alignment. Range of motion is about 5 to 120 degrees. The knee is grossly stable. There is no effusion. Patella is tracking central      DATA:  CBC: No results found for: WBC, RBC, HGB, HCT, MCV, MCH, MCHC, RDW, PLT, MPV  BMP:  No results found for: NA, K, CL, CO2, BUN, LABALBU, CREATININE, CALCIUM, GFRAA, LABGLOM, GLUCOSE, GLU    Radiology Review: X-rays reviewed showing end-stage arthritis left knee. Right knee visualized shows good alignment of total knee arthroplasty    ASSESSMENT AND PLAN:    1. Patient is a 79 y.o. male with above specified procedure planned left knee Aiden robot assisted total knee arthroplasty with anesthesia    2. Procedure options, risks and benefits reviewed with patient. Patient expresses understanding and has signed consent form to proceed.     3.  Patient and family were provided with pre-op and post-op instructions, prescription medications, and any other supplied needed post op (slings, braces, etc.)    Controlled Substances Monitoring:            Marivel Pineda MD  Orthopaedic Surgery   7/7/22  3:59 PM

## 2022-07-11 ENCOUNTER — OFFICE VISIT (OUTPATIENT)
Dept: ORTHOPEDIC SURGERY | Age: 67
End: 2022-07-11

## 2022-07-11 VITALS — WEIGHT: 260 LBS | BODY MASS INDEX: 39.4 KG/M2 | HEIGHT: 68 IN

## 2022-07-11 DIAGNOSIS — M17.12 PRIMARY OSTEOARTHRITIS OF LEFT KNEE: Primary | ICD-10-CM

## 2022-07-11 DIAGNOSIS — M25.562 LEFT KNEE PAIN, UNSPECIFIED CHRONICITY: ICD-10-CM

## 2022-07-11 PROCEDURE — PREOPEXAM PRE-OP EXAM: Performed by: ORTHOPAEDIC SURGERY

## 2022-07-11 NOTE — PROGRESS NOTES
Surgery scheduling Joyce Gustafson) notified pt will be a same day discharge and to  Place that on surgery schedule. ..

## 2022-07-18 ENCOUNTER — HOSPITAL ENCOUNTER (OUTPATIENT)
Dept: PREADMISSION TESTING | Age: 67
Discharge: HOME | End: 2022-07-18
Payer: MEDICARE

## 2022-07-18 ENCOUNTER — ANESTHESIA EVENT (OUTPATIENT)
Dept: OPERATING ROOM | Age: 67
End: 2022-07-18
Payer: MEDICARE

## 2022-07-18 VITALS
DIASTOLIC BLOOD PRESSURE: 84 MMHG | HEART RATE: 73 BPM | SYSTOLIC BLOOD PRESSURE: 172 MMHG | RESPIRATION RATE: 20 BRPM | BODY MASS INDEX: 39.4 KG/M2 | OXYGEN SATURATION: 98 % | TEMPERATURE: 97.8 F | WEIGHT: 260 LBS | HEIGHT: 68 IN

## 2022-07-18 DIAGNOSIS — Z01.818 PRE-OP TESTING: ICD-10-CM

## 2022-07-18 LAB
ANION GAP SERPL CALCULATED.3IONS-SCNC: 12 MMOL/L (ref 7–16)
BASOPHILS ABSOLUTE: 0.04 E9/L (ref 0–0.2)
BASOPHILS RELATIVE PERCENT: 0.4 % (ref 0–2)
BUN BLDV-MCNC: 15 MG/DL (ref 6–23)
CALCIUM SERPL-MCNC: 9.5 MG/DL (ref 8.6–10.2)
CHLORIDE BLD-SCNC: 108 MMOL/L (ref 98–107)
CO2: 26 MMOL/L (ref 22–29)
CREAT SERPL-MCNC: 0.8 MG/DL (ref 0.7–1.2)
EOSINOPHILS ABSOLUTE: 0.21 E9/L (ref 0.05–0.5)
EOSINOPHILS RELATIVE PERCENT: 2.3 % (ref 0–6)
GFR AFRICAN AMERICAN: >60
GFR NON-AFRICAN AMERICAN: >60 ML/MIN/1.73
GLUCOSE BLD-MCNC: 108 MG/DL (ref 74–99)
HCT VFR BLD CALC: 46.8 % (ref 37–54)
HEMOGLOBIN: 14.9 G/DL (ref 12.5–16.5)
IMMATURE GRANULOCYTES #: 0.03 E9/L
IMMATURE GRANULOCYTES %: 0.3 % (ref 0–5)
LYMPHOCYTES ABSOLUTE: 1.94 E9/L (ref 1.5–4)
LYMPHOCYTES RELATIVE PERCENT: 21.5 % (ref 20–42)
MCH RBC QN AUTO: 29.6 PG (ref 26–35)
MCHC RBC AUTO-ENTMCNC: 31.8 % (ref 32–34.5)
MCV RBC AUTO: 92.9 FL (ref 80–99.9)
MONOCYTES ABSOLUTE: 0.73 E9/L (ref 0.1–0.95)
MONOCYTES RELATIVE PERCENT: 8.1 % (ref 2–12)
NEUTROPHILS ABSOLUTE: 6.08 E9/L (ref 1.8–7.3)
NEUTROPHILS RELATIVE PERCENT: 67.4 % (ref 43–80)
PDW BLD-RTO: 14.6 FL (ref 11.5–15)
PLATELET # BLD: 240 E9/L (ref 130–450)
PMV BLD AUTO: 10 FL (ref 7–12)
POTASSIUM SERPL-SCNC: 4.1 MMOL/L (ref 3.5–5)
PREALBUMIN: 24 MG/DL (ref 20–40)
RBC # BLD: 5.04 E12/L (ref 3.8–5.8)
SODIUM BLD-SCNC: 146 MMOL/L (ref 132–146)
WBC # BLD: 9 E9/L (ref 4.5–11.5)

## 2022-07-18 PROCEDURE — 85025 COMPLETE CBC W/AUTO DIFF WBC: CPT

## 2022-07-18 PROCEDURE — 80048 BASIC METABOLIC PNL TOTAL CA: CPT

## 2022-07-18 PROCEDURE — 84134 ASSAY OF PREALBUMIN: CPT

## 2022-07-18 PROCEDURE — 87081 CULTURE SCREEN ONLY: CPT

## 2022-07-18 PROCEDURE — 36415 COLL VENOUS BLD VENIPUNCTURE: CPT

## 2022-07-18 RX ORDER — M-VIT,TX,IRON,MINS/CALC/FOLIC 27MG-0.4MG
1 TABLET ORAL DAILY
COMMUNITY

## 2022-07-18 RX ORDER — ASPIRIN 81 MG/1
81 TABLET ORAL DAILY
Status: ON HOLD | COMMUNITY
End: 2022-07-21 | Stop reason: HOSPADM

## 2022-07-18 RX ORDER — ALBUTEROL SULFATE 90 UG/1
2 AEROSOL, METERED RESPIRATORY (INHALATION) EVERY 6 HOURS PRN
Status: ON HOLD | COMMUNITY
Start: 2022-04-25 | End: 2022-07-21 | Stop reason: ALTCHOICE

## 2022-07-18 RX ORDER — MULTIVIT-MIN/IRON/FOLIC ACID/K 18-600-40
1 CAPSULE ORAL DAILY
COMMUNITY

## 2022-07-18 ASSESSMENT — PAIN - FUNCTIONAL ASSESSMENT: PAIN_FUNCTIONAL_ASSESSMENT: PREVENTS OR INTERFERES SOME ACTIVE ACTIVITIES AND ADLS

## 2022-07-18 ASSESSMENT — PAIN SCALES - GENERAL: PAINLEVEL_OUTOF10: 4

## 2022-07-18 ASSESSMENT — KOOS JR
TWISING OR PIVOTING ON KNEE: 3
RISING FROM SITTING: 4
GOING UP OR DOWN STAIRS: 3
STRAIGHTENING KNEE FULLY: 4
STANDING UPRIGHT: 3
BENDING TO THE FLOOR TO PICK UP OBJECT: 3
HOW SEVERE IS YOUR KNEE STIFFNESS AFTER FIRST WAKING IN MORNING: 3
KOOS JR TOTAL INTERVAL SCORE: 28.251

## 2022-07-18 ASSESSMENT — PAIN DESCRIPTION - PAIN TYPE: TYPE: CHRONIC PAIN

## 2022-07-18 ASSESSMENT — PAIN DESCRIPTION - DESCRIPTORS: DESCRIPTORS: ACHING;DISCOMFORT

## 2022-07-18 ASSESSMENT — PAIN DESCRIPTION - ORIENTATION: ORIENTATION: LEFT

## 2022-07-18 ASSESSMENT — PAIN DESCRIPTION - FREQUENCY: FREQUENCY: CONTINUOUS

## 2022-07-18 ASSESSMENT — PAIN DESCRIPTION - LOCATION: LOCATION: KNEE

## 2022-07-18 ASSESSMENT — PAIN DESCRIPTION - ONSET: ONSET: ON-GOING

## 2022-07-18 NOTE — PROGRESS NOTES
[facility] PRE-ADMISSION TESTING INSTRUCTIONS    The Preadmission Testing patient is instructed accordingly using the following criteria (check applicable):    ARRIVAL INSTRUCTIONS:  [x] Parking the day of Surgery is located in the Main Entrance lot. Upon entering the door, make an immediate right to the surgery reception desk    [x] Bring photo ID and insurance card    [x] Bring in a copy of Living will or Durable Power of  papers. [] Please be sure to arrange for responsible adult to provide transportation to and from the hospital    [x] Please arrange for responsible adult to be with you for the 24 hour period post procedure due to having anesthesia    [x] If you awake am of surgery not feeling well or have temperature >100 please call [phone]    GENERAL INSTRUCTIONS:    [x] Nothing by mouth after midnight, including gum, candy, mints or water    [x] You may brush your teeth, but do not swallow any water    [x] Take medications as instructed with 1-2 oz of water    [] Stop herbal supplements and vitamins 5 days prior to procedure    [] Follow preop dosing of blood thinners per physician instructions    [] Take 1/2 dose of evening insulin, but no insulin after midnight    [] No oral diabetic medications after midnight    [] If diabetic and have low blood sugar or feel symptomatic, take 1-2oz apple juice only    [] Bring inhalers day of surgery    [] Bring C-PAP/ Bi-Pap day of surgery    [] Bring urine specimen day of surgery    [] Shower or bath with soap, lather and rinse well, AM of Surgery, no lotion, powders or creams to surgical site    [] Follow bowel prep as instructed per surgeon    [x] No tobacco products within 24 hours of surgery     [x] No alcohol or illegal drug use within 24 hours of surgery.     [x] Jewelry, body piercing's, eyeglasses, contact lenses and dentures are not permitted into surgery (bring cases)      [] Please do not wear any nail polish, make up or hair products on the day of surgery    [x] You can expect a call the business day prior to procedure to notify you if your arrival time changes    [x] If you receive a survey after surgery we would greatly appreciate your comments    [] Parent/guardian of a minor must accompany their child and remain on the premises  the entire time they are under our care     [] Pediatric patients may bring favorite toy, blanket or comfort item with them    [] A caregiver or family member must remain with the patient during their stay if they are mentally handicapped, have dementia, disoriented or unable to use a call light or would be a safety concern if left unattended    [x] Please notify surgeon if you develop any illness between now and time of surgery (cold, cough, sore throat, fever, nausea, vomiting) or any signs of infections  including skin, wounds, and dental.    [x]  The Outpatient Pharmacy is available to fill your prescription here on your day of surgery, ask your preop nurse for details    [x] Other instructions: wear loose, comfortable clothing    EDUCATIONAL MATERIALS PROVIDED:    [x] PAT Preoperative Education Packet/Booklet     [x] Medication List    [] Transfusion bracelet applied with instructions    [x] Shower with soap, lather and rinse well, and use CHG wipes provided the evening before surgery as instructed    [x] Incentive spirometer with instructions

## 2022-07-18 NOTE — ANESTHESIA PRE PROCEDURE
Department of Anesthesiology  Preprocedure Note       Name:  Kimberley Gómez   Age:  79 y.o.  :  1955                                          MRN:  [id number]         Date:  2022      Surgeon: Abdulaziz Radford):  Carlotta Blackwood MD    Procedure: Procedure(s):  LEFT KNEE LIA ROBOTIC ASSISTED TOTAL ARTHROPLASTY   +++BOGDAN+++   ++PNB++   (SAME DAY DISCHARGE)    Medications prior to admission:   Prior to Admission medications    Medication Sig Start Date End Date Taking? Authorizing Provider   Cholecalciferol (VITAMIN D) 50 MCG ( UT) CAPS capsule Take 1 capsule by mouth in the morning. Yes Historical Provider, MD   Multiple Vitamins-Minerals (THERAPEUTIC MULTIVITAMIN-MINERALS) tablet Take 1 tablet by mouth in the morning. Yes Historical Provider, MD   aspirin 81 MG EC tablet Take 81 mg by mouth in the morning. Yes Historical Provider, MD   albuterol sulfate HFA (PROVENTIL;VENTOLIN;PROAIR) 108 (90 Base) MCG/ACT inhaler Inhale 2 puffs into the lungs every 6 hours as needed for Wheezing 22   Historical Provider, MD   omeprazole (PRILOSEC) 20 MG delayed release capsule Take 20 mg by mouth daily    Historical Provider, MD   amLODIPine (NORVASC) 10 MG tablet Take 10 mg by mouth in the morning. 22   Historical Provider, MD   venlafaxine (EFFEXOR XR) 150 MG extended release capsule Take 150 mg by mouth in the morning. 22   Historical Provider, MD       Current medications:    Current Outpatient Medications   Medication Sig Dispense Refill    Cholecalciferol (VITAMIN D) 50 MCG ( UT) CAPS capsule Take 1 capsule by mouth in the morning.  Multiple Vitamins-Minerals (THERAPEUTIC MULTIVITAMIN-MINERALS) tablet Take 1 tablet by mouth in the morning.  aspirin 81 MG EC tablet Take 81 mg by mouth in the morning.       albuterol sulfate HFA (PROVENTIL;VENTOLIN;PROAIR) 108 (90 Base) MCG/ACT inhaler Inhale 2 puffs into the lungs every 6 hours as needed for Wheezing      omeprazole (PRILOSEC) 20 MG delayed release capsule Take 20 mg by mouth daily      amLODIPine (NORVASC) 10 MG tablet Take 10 mg by mouth in the morning.  venlafaxine (EFFEXOR XR) 150 MG extended release capsule Take 150 mg by mouth in the morning. No current facility-administered medications for this encounter. Allergies:  No Known Allergies    Problem List:  There is no problem list on file for this patient. Past Medical History:        Diagnosis Date    Arthritis     Cancer ([address]     Dysphagia     recent dilation 6/2022    Hyperlipidemia     Hypertension     SHARYN on CPAP     Osteoarthritis        Past Surgical History:        Procedure Laterality Date    JOINT REPLACEMENT      R TKA    SHOULDER SURGERY Bilateral     shaved bone    TONSILLECTOMY AND ADENOIDECTOMY      UPPER GASTROINTESTINAL ENDOSCOPY  06/2022    dilation       Social History:    Social History     Tobacco Use    Smoking status: Never    Smokeless tobacco: Never   Substance Use Topics    Alcohol use: Not Currently                                Counseling given: Not Answered      Vital Signs (Current):   Vitals:    07/18/22 1104   BP: (!) 172/84   Pulse: 73   Resp: 20   Temp: 97.8 °F (36.6 °C)   TempSrc: Temporal   SpO2: 98%   Weight: 260 lb (117.9 kg)   Height: 5' 8\" (1.727 m)                                              BP Readings from Last 3 Encounters:   07/18/22 (!) 172/84   04/26/22 (!) 172/90   04/08/22 (!) 166/96       NPO Status:                                                                                 BMI:   Wt Readings from Last 3 Encounters:   07/18/22 260 lb (117.9 kg)   07/11/22 260 lb (117.9 kg)   06/01/22 260 lb (117.9 kg)     Body mass index is 39.53 kg/m².     CBC:   Lab Results   Component Value Date/Time    WBC 9.0 07/18/2022 10:56 AM    RBC 5.04 07/18/2022 10:56 AM    HGB 14.9 07/18/2022 10:56 AM    HCT 46.8 07/18/2022 10:56 AM    MCV 92.9 07/18/2022 10:56 AM    RDW 14.6 07/18/2022 10:56 AM     07/18/2022 10:56 AM       CMP: No results found for: NA, K, CL, CO2, BUN, CREATININE, GFRAA, AGRATIO, LABGLOM, GLUCOSE, GLU, PROT, CALCIUM, BILITOT, ALKPHOS, AST, ALT    POC Tests: No results for input(s): POCGLU, POCNA, POCK, POCCL, POCBUN, POCHEMO, POCHCT in the last 72 hours. Coags: No results found for: PROTIME, INR, APTT    HCG (If Applicable): No results found for: PREGTESTUR, PREGSERUM, HCG, HCGQUANT     ABGs: No results found for: PHART, PO2ART, JMN1LWD, USF9ILK, BEART, T5WNROQO     Type & Screen (If Applicable):  No results found for: LABABO, LABRH    Drug/Infectious Status (If Applicable):  No results found for: HIV, HEPCAB    COVID-19 Screening (If Applicable): No results found for: COVID19        Anesthesia Evaluation  Patient summary reviewed and Nursing notes reviewed no history of anesthetic complications:   Airway: Mallampati: II  TM distance: >3 FB   Neck ROM: limited  Mouth opening: > = 3 FB   Dental:          Pulmonary:   (+) sleep apnea: on CPAP,  decreased breath sounds                            Cardiovascular:    (+) hypertension:,         Rhythm: regular  Rate: normal           Beta Blocker:  Not on Beta Blocker         Neuro/Psych:   Negative Neuro/Psych ROS              GI/Hepatic/Renal:   (+) GERD: well controlled,           Endo/Other: Negative Endo/Other ROS             Pt had PAT visit. Abdominal:   (+) obese,           Vascular: negative vascular ROS. Other Findings:           Anesthesia Plan      general, regional and spinal     ASA 3     (PREFERS SPINAL)  Induction: intravenous. BIS  MIPS: Postoperative opioids intended, Prophylactic antiemetics administered and Postoperative trial extubation. Anesthetic plan and risks discussed with patient. Use of blood products discussed with patient whom consented to blood products. Plan discussed with CRNA.                     Rhonda Holt MD   7/18/2022    Chart reviewed, patient seen and agree with above evaluation. CLARY Katz - CRNA      Pt seen, examined, chart reviewed, plan discussed.   Peterson Reyes MD  7/21/2022  7:59 AM

## 2022-07-18 NOTE — PROGRESS NOTES
I met with Mariella Garcia this am for an orthopaedic education class. We discussed information regarding pre, intra, post-op, discharge, and LOS expectations. I provided ortho education materials. I encouraged the patient to call with any questions or concerns.

## 2022-07-20 LAB — MRSA CULTURE ONLY: NORMAL

## 2022-07-20 NOTE — PROGRESS NOTES
Verified with Марина, at Dr. Atif Santos office, that pt is same day discharge as pt had observation order in epic. Order received from RIAZ Spring CNP to discontinue order for observation admit

## 2022-07-21 ENCOUNTER — HOSPITAL ENCOUNTER (OUTPATIENT)
Age: 67
Setting detail: OBSERVATION
Discharge: HOME | End: 2022-07-21
Attending: ORTHOPAEDIC SURGERY | Admitting: ORTHOPAEDIC SURGERY
Payer: MEDICARE

## 2022-07-21 ENCOUNTER — ANESTHESIA (OUTPATIENT)
Dept: PREADMISSION TESTING | Age: 67
End: 2022-07-21
Payer: MEDICARE

## 2022-07-21 ENCOUNTER — APPOINTMENT (OUTPATIENT)
Dept: GENERAL RADIOLOGY | Age: 67
End: 2022-07-21
Attending: ORTHOPAEDIC SURGERY
Payer: MEDICARE

## 2022-07-21 VITALS
SYSTOLIC BLOOD PRESSURE: 140 MMHG | TEMPERATURE: 98.6 F | HEIGHT: 68 IN | HEART RATE: 93 BPM | WEIGHT: 260 LBS | OXYGEN SATURATION: 92 % | RESPIRATION RATE: 16 BRPM | DIASTOLIC BLOOD PRESSURE: 74 MMHG | BODY MASS INDEX: 39.4 KG/M2

## 2022-07-21 DIAGNOSIS — Z96.652 STATUS POST LEFT KNEE REPLACEMENT: ICD-10-CM

## 2022-07-21 DIAGNOSIS — Z01.818 PRE-OP TESTING: Primary | ICD-10-CM

## 2022-07-21 DIAGNOSIS — M19.90 OSTEOARTHRITIS, UNSPECIFIED OSTEOARTHRITIS TYPE, UNSPECIFIED SITE: ICD-10-CM

## 2022-07-21 PROCEDURE — 97530 THERAPEUTIC ACTIVITIES: CPT

## 2022-07-21 PROCEDURE — 2580000003 HC RX 258: Performed by: NURSE PRACTITIONER

## 2022-07-21 PROCEDURE — 6360000002 HC RX W HCPCS: Performed by: ORTHOPAEDIC SURGERY

## 2022-07-21 PROCEDURE — 6360000002 HC RX W HCPCS: Performed by: NURSE PRACTITIONER

## 2022-07-21 PROCEDURE — 88311 DECALCIFY TISSUE: CPT

## 2022-07-21 PROCEDURE — 3600000005 HC SURGERY LEVEL 5 BASE: Performed by: ORTHOPAEDIC SURGERY

## 2022-07-21 PROCEDURE — 2500000003 HC RX 250 WO HCPCS: Performed by: NURSE ANESTHETIST, CERTIFIED REGISTERED

## 2022-07-21 PROCEDURE — 64447 NJX AA&/STRD FEMORAL NRV IMG: CPT | Performed by: ANESTHESIOLOGY

## 2022-07-21 PROCEDURE — 6360000002 HC RX W HCPCS: Performed by: NURSE ANESTHETIST, CERTIFIED REGISTERED

## 2022-07-21 PROCEDURE — 6360000002 HC RX W HCPCS: Performed by: ANESTHESIOLOGY

## 2022-07-21 PROCEDURE — 2500000003 HC RX 250 WO HCPCS: Performed by: NURSE PRACTITIONER

## 2022-07-21 PROCEDURE — 6370000000 HC RX 637 (ALT 250 FOR IP): Performed by: NURSE PRACTITIONER

## 2022-07-21 PROCEDURE — 2709999900 HC NON-CHARGEABLE SUPPLY: Performed by: ORTHOPAEDIC SURGERY

## 2022-07-21 PROCEDURE — 2500000003 HC RX 250 WO HCPCS: Performed by: ORTHOPAEDIC SURGERY

## 2022-07-21 PROCEDURE — 7100000000 HC PACU RECOVERY - FIRST 15 MIN: Performed by: ORTHOPAEDIC SURGERY

## 2022-07-21 PROCEDURE — 3700000000 HC ANESTHESIA ATTENDED CARE: Performed by: ORTHOPAEDIC SURGERY

## 2022-07-21 PROCEDURE — 2720000010 HC SURG SUPPLY STERILE: Performed by: ORTHOPAEDIC SURGERY

## 2022-07-21 PROCEDURE — G0378 HOSPITAL OBSERVATION PER HR: HCPCS

## 2022-07-21 PROCEDURE — 7100000001 HC PACU RECOVERY - ADDTL 15 MIN: Performed by: ORTHOPAEDIC SURGERY

## 2022-07-21 PROCEDURE — C1776 JOINT DEVICE (IMPLANTABLE): HCPCS | Performed by: ORTHOPAEDIC SURGERY

## 2022-07-21 PROCEDURE — 20985 CPTR-ASST DIR MS PX: CPT | Performed by: ORTHOPAEDIC SURGERY

## 2022-07-21 PROCEDURE — 2580000003 HC RX 258: Performed by: NURSE ANESTHETIST, CERTIFIED REGISTERED

## 2022-07-21 PROCEDURE — 88305 TISSUE EXAM BY PATHOLOGIST: CPT

## 2022-07-21 PROCEDURE — 27447 TOTAL KNEE ARTHROPLASTY: CPT | Performed by: ORTHOPAEDIC SURGERY

## 2022-07-21 PROCEDURE — 97165 OT EVAL LOW COMPLEX 30 MIN: CPT

## 2022-07-21 PROCEDURE — 97161 PT EVAL LOW COMPLEX 20 MIN: CPT

## 2022-07-21 PROCEDURE — 73560 X-RAY EXAM OF KNEE 1 OR 2: CPT

## 2022-07-21 PROCEDURE — 3600000015 HC SURGERY LEVEL 5 ADDTL 15MIN: Performed by: ORTHOPAEDIC SURGERY

## 2022-07-21 PROCEDURE — 3700000001 HC ADD 15 MINUTES (ANESTHESIA): Performed by: ORTHOPAEDIC SURGERY

## 2022-07-21 PROCEDURE — 97535 SELF CARE MNGMENT TRAINING: CPT

## 2022-07-21 PROCEDURE — C1713 ANCHOR/SCREW BN/BN,TIS/BN: HCPCS | Performed by: ORTHOPAEDIC SURGERY

## 2022-07-21 PROCEDURE — 2500000003 HC RX 250 WO HCPCS: Performed by: ANESTHESIOLOGY

## 2022-07-21 DEVICE — BASEPLATE TIB SZ 7 AP56MM ML80MM KNEE TRITANIUM 4 CRUCFRM: Type: IMPLANTABLE DEVICE | Site: KNEE | Status: FUNCTIONAL

## 2022-07-21 DEVICE — COMPONENT PAT DIA35MM THK10MM SUPERIOR/INFERIOR KNEE: Type: IMPLANTABLE DEVICE | Site: KNEE | Status: FUNCTIONAL

## 2022-07-21 DEVICE — IMPLANTABLE DEVICE: Type: IMPLANTABLE DEVICE | Site: KNEE | Status: FUNCTIONAL

## 2022-07-21 RX ORDER — SODIUM CHLORIDE 9 MG/ML
INJECTION, SOLUTION INTRAVENOUS CONTINUOUS PRN
Status: DISCONTINUED | OUTPATIENT
Start: 2022-07-21 | End: 2022-07-21 | Stop reason: SDUPTHER

## 2022-07-21 RX ORDER — SODIUM CHLORIDE 9 MG/ML
INJECTION, SOLUTION INTRAVENOUS PRN
Status: DISCONTINUED | OUTPATIENT
Start: 2022-07-21 | End: 2022-07-21 | Stop reason: HOSPADM

## 2022-07-21 RX ORDER — ROCURONIUM BROMIDE 10 MG/ML
INJECTION, SOLUTION INTRAVENOUS PRN
Status: DISCONTINUED | OUTPATIENT
Start: 2022-07-21 | End: 2022-07-21 | Stop reason: SDUPTHER

## 2022-07-21 RX ORDER — ACETAMINOPHEN 500 MG
1000 TABLET ORAL ONCE
Status: COMPLETED | OUTPATIENT
Start: 2022-07-21 | End: 2022-07-21

## 2022-07-21 RX ORDER — OXYCODONE HYDROCHLORIDE 5 MG/1
5 TABLET ORAL EVERY 4 HOURS PRN
Status: DISCONTINUED | OUTPATIENT
Start: 2022-07-21 | End: 2022-07-21 | Stop reason: HOSPADM

## 2022-07-21 RX ORDER — OXYCODONE HYDROCHLORIDE AND ACETAMINOPHEN 5; 325 MG/1; MG/1
1 TABLET ORAL EVERY 6 HOURS PRN
Qty: 28 TABLET | Refills: 0 | Status: SHIPPED | OUTPATIENT
Start: 2022-07-21 | End: 2022-07-28

## 2022-07-21 RX ORDER — ONDANSETRON 2 MG/ML
4 INJECTION INTRAMUSCULAR; INTRAVENOUS EVERY 6 HOURS PRN
Status: DISCONTINUED | OUTPATIENT
Start: 2022-07-21 | End: 2022-07-21 | Stop reason: HOSPADM

## 2022-07-21 RX ORDER — SODIUM CHLORIDE 0.9 % (FLUSH) 0.9 %
5-40 SYRINGE (ML) INJECTION EVERY 12 HOURS SCHEDULED
Status: DISCONTINUED | OUTPATIENT
Start: 2022-07-21 | End: 2022-07-21 | Stop reason: HOSPADM

## 2022-07-21 RX ORDER — BUPIVACAINE HYDROCHLORIDE 7.5 MG/ML
INJECTION, SOLUTION EPIDURAL; RETROBULBAR
Status: COMPLETED | OUTPATIENT
Start: 2022-07-21 | End: 2022-07-21

## 2022-07-21 RX ORDER — OXYCODONE HYDROCHLORIDE AND ACETAMINOPHEN 5; 325 MG/1; MG/1
1 TABLET ORAL EVERY 6 HOURS PRN
Qty: 28 TABLET | Refills: 0 | Status: SHIPPED | OUTPATIENT
Start: 2022-07-21 | End: 2022-07-21 | Stop reason: SDUPTHER

## 2022-07-21 RX ORDER — DEXAMETHASONE SODIUM PHOSPHATE 10 MG/ML
8 INJECTION, SOLUTION INTRAMUSCULAR; INTRAVENOUS ONCE
Status: COMPLETED | OUTPATIENT
Start: 2022-07-21 | End: 2022-07-21

## 2022-07-21 RX ORDER — KETOROLAC TROMETHAMINE 30 MG/ML
15 INJECTION, SOLUTION INTRAMUSCULAR; INTRAVENOUS ONCE
Status: COMPLETED | OUTPATIENT
Start: 2022-07-21 | End: 2022-07-21

## 2022-07-21 RX ORDER — DEXAMETHASONE SODIUM PHOSPHATE 4 MG/ML
INJECTION, SOLUTION INTRA-ARTICULAR; INTRALESIONAL; INTRAMUSCULAR; INTRAVENOUS; SOFT TISSUE PRN
Status: DISCONTINUED | OUTPATIENT
Start: 2022-07-21 | End: 2022-07-21 | Stop reason: SDUPTHER

## 2022-07-21 RX ORDER — FENTANYL CITRATE 50 UG/ML
100 INJECTION, SOLUTION INTRAMUSCULAR; INTRAVENOUS ONCE
Status: COMPLETED | OUTPATIENT
Start: 2022-07-21 | End: 2022-07-21

## 2022-07-21 RX ORDER — OXYCODONE HYDROCHLORIDE 5 MG/1
10 TABLET ORAL EVERY 4 HOURS PRN
Status: DISCONTINUED | OUTPATIENT
Start: 2022-07-21 | End: 2022-07-21 | Stop reason: HOSPADM

## 2022-07-21 RX ORDER — M-VIT,TX,IRON,MINS/CALC/FOLIC 27MG-0.4MG
1 TABLET ORAL DAILY
Status: DISCONTINUED | OUTPATIENT
Start: 2022-07-21 | End: 2022-07-21 | Stop reason: HOSPADM

## 2022-07-21 RX ORDER — GLYCOPYRROLATE 0.2 MG/ML
INJECTION INTRAMUSCULAR; INTRAVENOUS PRN
Status: DISCONTINUED | OUTPATIENT
Start: 2022-07-21 | End: 2022-07-21 | Stop reason: SDUPTHER

## 2022-07-21 RX ORDER — ROPIVACAINE HYDROCHLORIDE 5 MG/ML
30 INJECTION, SOLUTION EPIDURAL; INFILTRATION; PERINEURAL ONCE
Status: COMPLETED | OUTPATIENT
Start: 2022-07-21 | End: 2022-07-21

## 2022-07-21 RX ORDER — AMLODIPINE BESYLATE 10 MG/1
10 TABLET ORAL DAILY
Status: DISCONTINUED | OUTPATIENT
Start: 2022-07-22 | End: 2022-07-21 | Stop reason: HOSPADM

## 2022-07-21 RX ORDER — MIDAZOLAM HYDROCHLORIDE 1 MG/ML
INJECTION INTRAMUSCULAR; INTRAVENOUS PRN
Status: DISCONTINUED | OUTPATIENT
Start: 2022-07-21 | End: 2022-07-21 | Stop reason: SDUPTHER

## 2022-07-21 RX ORDER — ASPIRIN 81 MG/1
81 TABLET ORAL 2 TIMES DAILY
Status: DISCONTINUED | OUTPATIENT
Start: 2022-07-21 | End: 2022-07-21 | Stop reason: HOSPADM

## 2022-07-21 RX ORDER — MORPHINE SULFATE 2 MG/ML
2 INJECTION, SOLUTION INTRAMUSCULAR; INTRAVENOUS EVERY 5 MIN PRN
Status: DISCONTINUED | OUTPATIENT
Start: 2022-07-21 | End: 2022-07-21 | Stop reason: HOSPADM

## 2022-07-21 RX ORDER — CEFAZOLIN SODIUM 1 G/3ML
INJECTION, POWDER, FOR SOLUTION INTRAMUSCULAR; INTRAVENOUS PRN
Status: DISCONTINUED | OUTPATIENT
Start: 2022-07-21 | End: 2022-07-21 | Stop reason: SDUPTHER

## 2022-07-21 RX ORDER — VANCOMYCIN HYDROCHLORIDE 1 G/20ML
INJECTION, POWDER, LYOPHILIZED, FOR SOLUTION INTRAVENOUS PRN
Status: DISCONTINUED | OUTPATIENT
Start: 2022-07-21 | End: 2022-07-21 | Stop reason: ALTCHOICE

## 2022-07-21 RX ORDER — VENLAFAXINE HYDROCHLORIDE 150 MG/1
150 CAPSULE, EXTENDED RELEASE ORAL DAILY
Status: DISCONTINUED | OUTPATIENT
Start: 2022-07-22 | End: 2022-07-21 | Stop reason: HOSPADM

## 2022-07-21 RX ORDER — SODIUM CHLORIDE 0.9 % (FLUSH) 0.9 %
5-40 SYRINGE (ML) INJECTION PRN
Status: DISCONTINUED | OUTPATIENT
Start: 2022-07-21 | End: 2022-07-21 | Stop reason: HOSPADM

## 2022-07-21 RX ORDER — ONDANSETRON 2 MG/ML
INJECTION INTRAMUSCULAR; INTRAVENOUS PRN
Status: DISCONTINUED | OUTPATIENT
Start: 2022-07-21 | End: 2022-07-21 | Stop reason: SDUPTHER

## 2022-07-21 RX ORDER — ONDANSETRON 4 MG/1
4 TABLET, ORALLY DISINTEGRATING ORAL EVERY 8 HOURS PRN
Status: DISCONTINUED | OUTPATIENT
Start: 2022-07-21 | End: 2022-07-21 | Stop reason: HOSPADM

## 2022-07-21 RX ORDER — KETOROLAC TROMETHAMINE 30 MG/ML
15 INJECTION, SOLUTION INTRAMUSCULAR; INTRAVENOUS EVERY 6 HOURS
Status: DISCONTINUED | OUTPATIENT
Start: 2022-07-21 | End: 2022-07-21 | Stop reason: HOSPADM

## 2022-07-21 RX ORDER — FENTANYL CITRATE 50 UG/ML
INJECTION, SOLUTION INTRAMUSCULAR; INTRAVENOUS PRN
Status: DISCONTINUED | OUTPATIENT
Start: 2022-07-21 | End: 2022-07-21 | Stop reason: SDUPTHER

## 2022-07-21 RX ORDER — PANTOPRAZOLE SODIUM 40 MG/1
40 TABLET, DELAYED RELEASE ORAL
Status: DISCONTINUED | OUTPATIENT
Start: 2022-07-22 | End: 2022-07-21 | Stop reason: HOSPADM

## 2022-07-21 RX ORDER — ASPIRIN 81 MG/1
81 TABLET ORAL 2 TIMES DAILY
Qty: 56 TABLET | Refills: 0 | Status: SHIPPED | OUTPATIENT
Start: 2022-07-21 | End: 2022-07-21 | Stop reason: SDUPTHER

## 2022-07-21 RX ORDER — CHOLECALCIFEROL (VITAMIN D3) 50 MCG
2000 TABLET ORAL DAILY
Status: DISCONTINUED | OUTPATIENT
Start: 2022-07-21 | End: 2022-07-21 | Stop reason: HOSPADM

## 2022-07-21 RX ORDER — MEPERIDINE HYDROCHLORIDE 25 MG/ML
12.5 INJECTION INTRAMUSCULAR; INTRAVENOUS; SUBCUTANEOUS EVERY 5 MIN PRN
Status: DISCONTINUED | OUTPATIENT
Start: 2022-07-21 | End: 2022-07-21 | Stop reason: HOSPADM

## 2022-07-21 RX ORDER — PROPOFOL 10 MG/ML
INJECTION, EMULSION INTRAVENOUS PRN
Status: DISCONTINUED | OUTPATIENT
Start: 2022-07-21 | End: 2022-07-21 | Stop reason: SDUPTHER

## 2022-07-21 RX ORDER — MIDAZOLAM HYDROCHLORIDE 2 MG/2ML
2 INJECTION, SOLUTION INTRAMUSCULAR; INTRAVENOUS ONCE
Status: COMPLETED | OUTPATIENT
Start: 2022-07-21 | End: 2022-07-21

## 2022-07-21 RX ORDER — ASPIRIN 81 MG/1
81 TABLET ORAL 2 TIMES DAILY
Qty: 56 TABLET | Refills: 0 | Status: SHIPPED | OUTPATIENT
Start: 2022-07-21 | End: 2022-10-17

## 2022-07-21 RX ORDER — MORPHINE SULFATE 2 MG/ML
1 INJECTION, SOLUTION INTRAMUSCULAR; INTRAVENOUS EVERY 5 MIN PRN
Status: DISCONTINUED | OUTPATIENT
Start: 2022-07-21 | End: 2022-07-21 | Stop reason: HOSPADM

## 2022-07-21 RX ORDER — ACETAMINOPHEN 325 MG/1
650 TABLET ORAL EVERY 6 HOURS
Status: DISCONTINUED | OUTPATIENT
Start: 2022-07-21 | End: 2022-07-21 | Stop reason: HOSPADM

## 2022-07-21 RX ORDER — NEOSTIGMINE METHYLSULFATE 1 MG/ML
INJECTION, SOLUTION INTRAVENOUS PRN
Status: DISCONTINUED | OUTPATIENT
Start: 2022-07-21 | End: 2022-07-21 | Stop reason: SDUPTHER

## 2022-07-21 RX ADMIN — BUPIVACAINE HYDROCHLORIDE 5 MG: 7.5 INJECTION, SOLUTION EPIDURAL; RETROBULBAR at 08:28

## 2022-07-21 RX ADMIN — ROPIVACAINE HYDROCHLORIDE 30 ML: 5 INJECTION, SOLUTION EPIDURAL; INFILTRATION; PERINEURAL at 07:05

## 2022-07-21 RX ADMIN — Medication 3 MG: at 10:20

## 2022-07-21 RX ADMIN — CEFAZOLIN 2000 MG: 2 INJECTION, POWDER, FOR SOLUTION INTRAMUSCULAR; INTRAVENOUS at 16:24

## 2022-07-21 RX ADMIN — SODIUM CHLORIDE, PRESERVATIVE FREE 10 ML: 5 INJECTION INTRAVENOUS at 16:25

## 2022-07-21 RX ADMIN — TRANEXAMIC ACID 1000 MG: 1 INJECTION, SOLUTION INTRAVENOUS at 11:17

## 2022-07-21 RX ADMIN — MIDAZOLAM 1 MG: 1 INJECTION INTRAMUSCULAR; INTRAVENOUS at 08:03

## 2022-07-21 RX ADMIN — ACETAMINOPHEN 650 MG: 325 TABLET ORAL at 15:07

## 2022-07-21 RX ADMIN — FENTANYL CITRATE 25 MCG: 50 INJECTION INTRAMUSCULAR; INTRAVENOUS at 08:04

## 2022-07-21 RX ADMIN — DEXAMETHASONE SODIUM PHOSPHATE 8 MG: 4 INJECTION, SOLUTION INTRAMUSCULAR; INTRAVENOUS at 08:45

## 2022-07-21 RX ADMIN — PROPOFOL 250 MG: 10 INJECTION, EMULSION INTRAVENOUS at 08:20

## 2022-07-21 RX ADMIN — MIDAZOLAM HYDROCHLORIDE 2 MG: 1 INJECTION, SOLUTION INTRAMUSCULAR; INTRAVENOUS at 07:43

## 2022-07-21 RX ADMIN — ACETAMINOPHEN 1000 MG: 500 TABLET ORAL at 06:21

## 2022-07-21 RX ADMIN — ROPIVACAINE HYDROCHLORIDE 30 ML: 5 INJECTION EPIDURAL; INFILTRATION; PERINEURAL at 07:46

## 2022-07-21 RX ADMIN — KETOROLAC TROMETHAMINE 15 MG: 30 INJECTION, SOLUTION INTRAMUSCULAR; INTRAVENOUS at 12:58

## 2022-07-21 RX ADMIN — FENTANYL CITRATE 100 MCG: 50 INJECTION, SOLUTION INTRAMUSCULAR; INTRAVENOUS at 07:43

## 2022-07-21 RX ADMIN — ONDANSETRON 4 MG: 2 INJECTION INTRAMUSCULAR; INTRAVENOUS at 10:11

## 2022-07-21 RX ADMIN — SODIUM CHLORIDE: 9 INJECTION, SOLUTION INTRAVENOUS at 08:56

## 2022-07-21 RX ADMIN — FENTANYL CITRATE 25 MCG: 50 INJECTION INTRAMUSCULAR; INTRAVENOUS at 08:08

## 2022-07-21 RX ADMIN — GLYCOPYRROLATE 0.6 MG: 0.2 INJECTION INTRAMUSCULAR; INTRAVENOUS at 10:20

## 2022-07-21 RX ADMIN — DEXAMETHASONE SODIUM PHOSPHATE 8 MG: 10 INJECTION, SOLUTION INTRAMUSCULAR; INTRAVENOUS at 06:21

## 2022-07-21 RX ADMIN — KETOROLAC TROMETHAMINE 15 MG: 30 INJECTION, SOLUTION INTRAMUSCULAR; INTRAVENOUS at 06:21

## 2022-07-21 RX ADMIN — TRANEXAMIC ACID 1000 MG: 1 INJECTION, SOLUTION INTRAVENOUS at 08:14

## 2022-07-21 RX ADMIN — ASPIRIN 81 MG: 81 TABLET, COATED ORAL at 15:07

## 2022-07-21 RX ADMIN — SODIUM CHLORIDE: 9 INJECTION, SOLUTION INTRAVENOUS at 07:50

## 2022-07-21 RX ADMIN — ROCURONIUM BROMIDE 50 MG: 10 INJECTION, SOLUTION INTRAVENOUS at 08:21

## 2022-07-21 RX ADMIN — CEFAZOLIN 3000 MG: 1 INJECTION, POWDER, FOR SOLUTION INTRAMUSCULAR; INTRAVENOUS at 07:56

## 2022-07-21 ASSESSMENT — PAIN DESCRIPTION - PAIN TYPE: TYPE: SURGICAL PAIN

## 2022-07-21 ASSESSMENT — PAIN SCALES - GENERAL
PAINLEVEL_OUTOF10: 0
PAINLEVEL_OUTOF10: 2
PAINLEVEL_OUTOF10: 2

## 2022-07-21 ASSESSMENT — PAIN DESCRIPTION - ORIENTATION: ORIENTATION: LEFT

## 2022-07-21 ASSESSMENT — PAIN - FUNCTIONAL ASSESSMENT
PAIN_FUNCTIONAL_ASSESSMENT: PREVENTS OR INTERFERES WITH MANY ACTIVE NOT PASSIVE ACTIVITIES
PAIN_FUNCTIONAL_ASSESSMENT: ACTIVITIES ARE NOT PREVENTED
PAIN_FUNCTIONAL_ASSESSMENT: 0-10

## 2022-07-21 ASSESSMENT — PAIN DESCRIPTION - DESCRIPTORS
DESCRIPTORS: ACHING
DESCRIPTORS: ACHING

## 2022-07-21 ASSESSMENT — PAIN SCALES - WONG BAKER
WONGBAKER_NUMERICALRESPONSE: 0

## 2022-07-21 ASSESSMENT — PAIN DESCRIPTION - FREQUENCY: FREQUENCY: CONTINUOUS

## 2022-07-21 ASSESSMENT — PAIN DESCRIPTION - ONSET: ONSET: ON-GOING

## 2022-07-21 ASSESSMENT — PAIN DESCRIPTION - LOCATION: LOCATION: KNEE

## 2022-07-21 NOTE — OP NOTE
OPERATIVE REPORT    PATIENT:  Kae Williamson  [id number]    DATE OF PROCEDURE:  7/21/22    SURGEON: Emeli Monreal MD    ASSISTANT:  Evonne Whitman DO, DO Sami; Maria Del Carmen Estevez CNP. CNP was necessary for positioning, prepping/draping, intra-operative assistance, and wound closure. His assistance expedited the procedure and decreased operating room time. PREOPERATIVE DIAGNOSIS: Degenerative joint disease , Left knee. POSTOPERATIVE DIAGNOSIS: Degenerative joint disease,  Leftknee. OPERATION: Aiden Robot Assisted Left total knee arthroplasty     ANESTHESIA: . general, spinal, and adductor canal block     OPERATIVE INDICATIONS:  The patient is a 79year old male with end stage degenerative joint disease involving the knee, for which more conservative non operative management has failed. The patient is thus indicated for total knee arthroplasty. We discussed use of the [facility] robot for assistance. The patient was agreeable. The risks and benefits, as well as alternatives were discussed at length with the patient in detail. These risks include, but are not limited to infection, nerve and/or blood vessel injury, intra or post operative fracture, need for revision surgery, failure of implants, deep vein thrombosis and pulmonary embolism, athrofibrosis, and the risks of general anesthesia provided by the anesthesiologist.   The patient understood these risks, signed an informed consent, and elected to proceed    OPERATIVE FINDINGS:   There was extensive eburnation of bone, and full thickness cartilage loss over the femoral and tibial condyles. OPERATIVE PROCEDURE: After satisfactory spinal anesthesia, the patient was placed supine on the operative table. A Bump was placed under the operative leg and a tourniquet was placed high on the operative thigh. The operative extremity was prepped and draped in sterile fashion.   Prior to procedure start, a time out was performed including confirmation of operative site and operation to be performed. Antibiotic prophylaxis, 3 g Ancef was given prior to incision, as was 1 g of transexamic acid. The leg was exsanguinated with an Esmarch bandage. The tourniquet was inflated. An anterior midline incision was made centered about the patella. Dissection was carried down in the midline of the extensor mechanism where a medial parapatellar arthrotomy was made. The patella was everted and a free hand method was used to cut the patella after removal of osteophytes. The patella was then sized and drilled for a size 35 asymmetric component. A patellar protector was placed and the patella was subluxed laterally. We then placed our femoral and tibial pins and assembled the array for the femur and tibia respectively. Femoral and tibial registration buttons were placed. Hip centering and identification of medial and lateral malleoli was performed. The knee was flexed. Appropriate points were registered on the femur followed by the tibia. All osteophytes were then removed. The knee was brought into extension. We noted baseline measurements were 15 flexion, 11 varus . Appropriate tension was placed on the medial and lateral compartments with sizing spoons and/or Tate elevators and we captured our measurements with correction of deformity. This was repeated with the knee in 90 degrees of flexion. Appropriate adjustments were then made utilizing the [address] to plan for 19 mm gaps in extension and 18 in flexion. We then prepared to make our cuts. The knee was flexed. Medial and lateral retractors were placed. Utilizing the [facility] robot arm, we made femoral and tibial cuts. All bone fragments were removed. A lamina  was placed in order to access the posterior aspect of the knee and remove osteophytes and remaining meniscal tissue.   The knee was then flexed, and the appropriate size tibial tray was placed in the correct amount of external rotation. A size 7 proved to be best fit. The tibial tray and 9mm poly trial were placed on the tibia and allowed to float. The femoral trial was then placed, size 7, and the knee was reduced and taken through a range of motion. We noted the knee was still a bit tight with about 10 degree flexion contracture. It was also a bit tight in flexion. We felt taking 2 mm more of bone off of the tibia would balance this nicely. We brought this back in and recut our tibia. Trials were replaced. We noted we now got him to about 6 to 7 degrees of extension although clinically appeared straight. Flexion was appropriate to 130 degrees without tibial tray lift off. The knee was then flexed again, and the femur was drilled. The tibial tower was placed and the tibia was punched. The additional component for press-fit tibia was placed and holes were drilled. All trial components were then removed. The knee was thoroughly irrigated. With the knee in extension, bovie electrocautery was utilized to coagulate in the posteromedial and posterolateral gutters. Local anesthetic consisting of 1% lidocaine with epi, 1% lidocaine without epi, and 0.25% Marcaine was then placed into the posterior capsule and into the periosteum of the femur and tibia, and into the anterior capsule. The bony surfaces were dried and the knee was flexed. The tibial component was then placed using a mallet. There was an excellent press-fit and we were able to lift the knee to be up without evidence of loosening. The final polyethylene was impacted into place. The press-fit femoral component was then placed. The knee was then placed in extension. The patella was everted and cleaned. The press-fit component was placed and seated, and checked with a [facility] and found to be stable. The tourniquet was released and bleeders were coagulated. A Betadine shock was performed for 3 minutes.   We assessed range of motion and stability once again and noted full extension, flexion 140 degrees without significant tightness in good posterior drawer, as well as symmetric gaps on our CT model. Irrigated once more. The patella was tracking a bit laterally so we pie crusted the lateral retinaculum from inside the joint which allowed our patella tracked midline. 500 mg of vancomycin powder was placed in the joint. The joint capsule was then closed using an O stratafix. 2-O interrupted vicryl suture was used to close the subcutanseous tissue. Finally, a 4-O running subcuticular monocryl suture was used to closed skin. Tibial pin sites were closed with nylon. Dermabond was then placed over the incision. A sterile dressing was placed. The patient was then transferred to their hospital bed, awoken from anesthesia, and transported to the PACU in stable condition. IMPLANTS:   Implant Name Type Inv. Item Serial No.  Lot No. LRB No. Used Action   BASEPLATE TIB SZ 7 [id number] ML80MM KNEE TRITANIUM 4 CRUCFRM - [id number]  BASEPLATE TIB SZ 7 [id number] ML80MM KNEE TRITANIUM 4 CRUCFRM  [employer] [id number] Left 1 Implanted   INSERT TIB BEAR SZ 7 THK9MM KNEE X3 CRUCE RET TRIATHLON - [id number]  INSERT TIB BEAR SZ 7 THK9MM KNEE X3 CRUCE RET TRIATHLON  [employer] [id number] Left 1 Implanted   COMPONENT FEM SZ 7 L KNEE GORDON APATITE CRUCE RET CEMENTLESS - [id number]  COMPONENT FEM SZ 7 L KNEE GORDON APATITE CRUCE RET CEMENTLESS  [employer] [id number] Left 1 Implanted   COMPONENT PAT [id number] SUPERIOR/INFERIOR KNEE - [id number]  COMPONENT PAT [id number] SUPERIOR/INFERIOR KNEE  [employer] [id number] Left 1 Implanted         ESTIMATED BLOOD LOSS: 25 mL    COMPLICATIONS: none    POST OPERATIVE PLAN: The patient will be transferred to the orthopaedic floor from PACU once stable. Post operative antibiotics will be continued for 24 hours.   Physical therapy will begin immediately, with weight bearing as tolerated. DVT prophylaxis will be with SCD's starting today, and ASA 81 mg BID starting tomorrow. I anticipate discharge to home/rehab within the first 3 post operative days.        Antonino Smith MD  Orthopaedic Surgery   7/21/22  10:18 AM

## 2022-07-21 NOTE — CARE COORDINATION
Met with patient about diagnosis and discharge plan of care. Post op left TKA. Pt seen in ortho class. Pt is same day surgery. Has wheeled walker and walk in shower with seat. Does not need 3-1 commode. Plan is [place] [facility] [place] [place]    The Plan for Transition of Care is related to the following treatment goals: home with home care     The Patient and/or patient representative pt was provided with a choice of provider and agrees   with the discharge plan. [x] Yes [] No    Freedom of choice list was provided with basic dialogue that supports the patient's individualized plan of care/goals, treatment preferences and shares the quality data associated with the providers.  [x] Yes [] No

## 2022-07-21 NOTE — H&P
Department of Orthopedic Surgery  Attending Pre-operative History and Physical        DIAGNOSIS:  Left Knee Osteoarthritis    INDICATION:  Left Knee Pain    PROCEDURE:  LEFT KNEE AIDEN ROBOTIC ASSISTED TOTAL ARTHROPLASTY    CHIEF COMPLAINT:  Left Knee Pain    History Obtained From:  patient    HISTORY OF PRESENT ILLNESS:      The patient is a 79 y.o. male with significant past medical history of left knee pain that has been refractory to conservative treatment. He had a right knee replacement about 5 years ago and has done very well. He has had progressive pain and disability due to left knee arthritis. He is therefore interested in surgical management namely left knee Aiden robot assisted total knee arthroplasty. Risks of surgery discussed in detail including but not limited to infection, neurovascular injury, stiffness, persistent pain, DVT/pulmonary embolus, need for revision surgery, death from anesthesia, unforeseen risks. He understands and would like to proceed    Past Medical History:        Diagnosis Date    Arthritis     Cancer ([facility] [address]     Dysphagia     recent dilation 6/2022    Hyperlipidemia     Hypertension     SHARYN on CPAP     Osteoarthritis        Past Surgical History:        Procedure Laterality Date    JOINT REPLACEMENT      R TKA    SHOULDER SURGERY Bilateral     shaved bone    TONSILLECTOMY AND ADENOIDECTOMY      UPPER GASTROINTESTINAL ENDOSCOPY  06/2022    dilation       Medications Prior to Admission:   Medications Prior to Admission: Cholecalciferol (VITAMIN D) 50 MCG (2000 UT) CAPS capsule, Take 1 capsule by mouth in the morning. Multiple Vitamins-Minerals (THERAPEUTIC MULTIVITAMIN-MINERALS) tablet, Take 1 tablet by mouth in the morning. aspirin 81 MG EC tablet, Take 81 mg by mouth in the morning. omeprazole (PRILOSEC) 20 MG delayed release capsule, Take 20 mg by mouth daily  amLODIPine (NORVASC) 10 MG tablet, Take 10 mg by mouth in the morning.   venlafaxine (EFFEXOR XR) 150 MG extended release capsule, Take 150 mg by mouth in the morning. Allergies:  Patient has no known allergies. History of allergic reaction to anesthesia:  No    Social History:   TOBACCO:   reports that he has never smoked. He has never used smokeless tobacco.  ETOH:   reports that he does not currently use alcohol. DRUGS:   reports no history of drug use. Family History:   History reviewed. No pertinent family history. REVIEW OF SYSTEMS:  CONSTITUTIONAL:  negative  RESPIRATORY:  negative  CARDIOVASCULAR:  negative  MUSCULOSKELETAL:  negative except for  HPI  NEUROLOGICAL:  negative    PHYSICAL EXAM:     VITALS:  BP (!) 149/80   Pulse 76   Temp 98.6 °F (37 °C) (Temporal)   Resp 18   Ht 5' 8\" (1.727 m)   Wt 260 lb (117.9 kg)   SpO2 100%   BMI 39.53 kg/m²     Gen: AOx3, NAD    Heart:  normal apical pulses, normal S1 and S2 and no edema    Lungs:  No increased work of breathing, good air exchange     Abdomen:  no scars, non-distended and non-tender    Extremities:  No clubbing, cyanosis, or edema    Musculoskeletal: Exam of the left knee shows varus alignment. Range of motion is about 5 to 120 degrees. The knee is grossly stable. There is no effusion.   Patella is tracking central      DATA:  CBC:   Lab Results   Component Value Date/Time    WBC 9.0 07/18/2022 10:56 AM    RBC 5.04 07/18/2022 10:56 AM    HGB 14.9 07/18/2022 10:56 AM    HCT 46.8 07/18/2022 10:56 AM    MCV 92.9 07/18/2022 10:56 AM    MCH 29.6 07/18/2022 10:56 AM    MCHC 31.8 07/18/2022 10:56 AM    RDW 14.6 07/18/2022 10:56 AM     07/18/2022 10:56 AM    MPV 10.0 07/18/2022 10:56 AM     BMP:    Lab Results   Component Value Date/Time     07/18/2022 10:56 AM    K 4.1 07/18/2022 10:56 AM     07/18/2022 10:56 AM    CO2 26 07/18/2022 10:56 AM    BUN 15 07/18/2022 10:56 AM    CREATININE 0.8 07/18/2022 10:56 AM    CALCIUM 9.5 07/18/2022 10:56 AM    GFRAA >60 07/18/2022 10:56 AM    LABGLOM >60 07/18/2022 10:56 AM    GLUCOSE 108 07/18/2022 10:56 AM       Radiology Review: X-rays reviewed showing end-stage arthritis left knee. Right knee visualized shows good alignment of total knee arthroplasty    ASSESSMENT AND PLAN:    1. Patient is a 79 y.o. male with above specified procedure planned left knee Aiden robot assisted total knee arthroplasty with anesthesia    2. Procedure options, risks and benefits reviewed with patient. Patient expresses understanding and has signed consent form to proceed. 3.  Patient and family were provided with pre-op and post-op instructions, prescription medications, and any other supplied needed post op (slings, braces, etc.)    Controlled substances monitoring: possible medication side effects, risk of tolerance and/or dependence, and alternative treatments discussed, no signs of potential drug abuse or diversion identified, and OARRS report reviewed today- activity consistent with treatment plan. Geraldine Eller MD  Orthopaedic Surgery   7/7/22  6:37 AM      Update History & Physical     The patient's History and Physical was reviewed with the patient and there were no significant changes. I examined the patient and there were no significant changes from the previous History and Physical.     Plan: The risk, benefits, expected outcome, and alternative to the recommended procedure have been discussed with the patient. Patient understands and wants to proceed with the procedure.       CLARY Lares-CNP  Orthopedic Surgery   07/21/22  6:38 AM

## 2022-07-21 NOTE — PROGRESS NOTES
Physical Therapy  Facility/Department: [facility] SURGERY  Physical Therapy Initial Assessment    Name: Roby Sandy  : 1955  MRN: [id number]  Date of Service: 2022          Patient Diagnosis(es): The primary encounter diagnosis was Pre-op testing. Diagnoses of Status post left knee replacement and Osteoarthritis, unspecified osteoarthritis type, unspecified site were also pertinent to this visit. Past Medical History:  has a past medical history of Arthritis, Cancer ([address] Dysphagia, Hyperlipidemia, Hypertension, SHARYN on CPAP, and Osteoarthritis. Past Surgical History:  has a past surgical history that includes joint replacement; shoulder surgery (Bilateral); Upper gastrointestinal endoscopy (2022); Tonsillectomy and adenoidectomy; and Total knee arthroplasty (Left, 2022). Referring provider:  Natalio Worley MD    PT Order:  PT eval and treat     Evaluating PT:  Charlie Tsang PT, DPT PT [id number]    Room #:  2780/1073-U  Diagnosis:  Osteoarthritis, unspecified osteoarthritis type, unspecified site [M19.90]  S/P total knee arthroplasty, left [Z96.652]  Procedure/Surgery:  left TKA  Precautions:  fall risk, WBAT left LE  Equipment Needs:  w/w. Pt reported owing cane. SUBJECTIVE:    Pt lives with his wife in a 2 story home with 3 stairs to enter and 0 rail. Pt reported there is a sturdy cabinet that he would be able to use to enter the house. Bed and bath is on first floor. Pt ambulated with no device PTA. OBJECTIVE:   Initial Evaluation  Date:  Treatment Short Term/ Long Term   Goals   Was pt agreeable to Eval/treatment? yes     Does pt have pain? 2/10 in left knee     Bed Mobility  Rolling: NT  Supine to sit: SBA  Sit to supine: NT  Scooting: SBA to sitting EOB  supervision   Transfers Sit to stand: SBA  Stand to sit: SBA  Stand pivot: NT  supervision   Ambulation   220 feet with w/w SBA.    200+ feet with w/w supervision    Stair negotiation: ascended and descended  4 steps with 2 rails SBA. 4 steps with 1 rail and SPC SBA   4 steps with 1 rail and device as needed Supervision   ROM Left LE:  hip and ankle WFL, knee 0-90 degrees  Right LE:  WFL  Increase left knee ROM 0 - 100 degrees   Strength Right LE:  grossly 3+/5    Left LE:  grossly 4+/5  Increase left LE strength by 1/2 mm grade   Balance Sitting EOB:  independent  Dynamic Standing:  SBA with w/w  Sitting EOB:  independent   Dynamic Standing:  supervision with w/w   AM-PAC 6 Clicks 32/08       Pt is A & O x 3  Sensation:  Pt denies numbness and tingling to extremities      Therapeutic Exercises:  educated pt about ankle pumps, quad sets and glut sets. Quad sets performed while pt supine in bed. Patient education  Pt educated on PT objectives during eval and while in the hospital, weight bearing, hand placement during transfers, walker usage, stair negotiation, car transfers, LE exercises at times in bed. Patient response to education:   Pt verbalized understanding Pt demonstrated skill Pt requires further education in this area   yes With cueing yes     ASSESSMENT:    Conditions Requiring Skilled Therapeutic Intervention:    [x]Decreased strength     [x]Decreased ROM  [x]Decreased functional mobility  [x]Decreased balance   [x]Decreased endurance   []Decreased posture  []Decreased sensation  []Decreased coordination   []Decreased vision  []Decreased safety awareness   []Increased pain       Comments:  Pt found in bed with nursing and pt's wife present and left sitting up in the chair with call light in reach and OT  and nursing present. Treatment:  Patient practiced and was instructed in the following treatment:    Functional mobility performed as documented above. No report of dizziness during functional mobility. Pt educated about ankle pumps, quad sets and glut sets at times in bed. Cueing required for hand placement during transfers. Cueing required for walker usage and safety.   Pt able to ambulate with reciprocal gait pattern. No LOB. Good gait speed. Cueing required for stair negotiation. Pt reported he doesn't have a railing to assist into the home but has a sturdy cabinet he can use for support and the use of cane. Pt able to perform stair negotiation without any difficulty. Educated pt about car transfer and pt verbalized understanding. All pt's questions were answered prior to end of PT session. Pt's/ family goals   1. To return home    Prognosis is good for reaching above PT goals. Patient and or family understand(s) diagnosis, prognosis, and plan of care.   yes    PHYSICAL THERAPY PLAN OF CARE:    PT POC is established based on physician order and patient diagnosis     Diagnosis:  Osteoarthritis, unspecified osteoarthritis type, unspecified site [M19.90]  S/P total knee arthroplasty, left [Z96.652]    Current Treatment Recommendations:     [x] Strengthening to improve independence with functional mobility   [x] ROM to improve independence with functional mobility   [x] Balance Training to improve static/dynamic balance and to reduce fall risk  [x] Endurance Training to improve activity tolerance during functional mobility   [x] Transfer Training to improve safety and independence with all functional transfers   [x] Gait Training to improve gait mechanics, endurance and assess need for appropriate assistive device  [x] Stair Training in preparation for safe discharge home and/or into the community   [] Positioning to prevent skin breakdown and contractures  [x] Safety and Education Training   [x] Patient/Caregiver Education   [x] HEP  [] Other     PT long term treatment goals are located in above grid    Frequency of treatments: BID    Time in  1426  Time out  1456    Total Treatment Time  15 minutes     Evaluation Time includes thorough review of current medical information, gathering information on past medical history/social history and prior level of function, completion of standardized testing/informal observation of tasks, assessment of data and education on plan of care and goals.     CPT codes:  [x] Low Complexity PT evaluation 43170  [] Moderate Complexity PT evaluation 06524  [] High Complexity PT evaluation 23175  [] PT Re-evaluation 09480  [x] Therapeutic activities 35862   15  minutes  [] Therapeutic exercises 58582 __ minutes      Andreia Nicole, PT, DPT  PT [id number]

## 2022-07-21 NOTE — PROGRESS NOTES
Occupational Therapy    OCCUPATIONAL THERAPY INITIAL EVALUATION     Livia Scruggs [place], [place]         Date:2022                                                  Patient Name: Nelsy Sharif    MRN: [id number]    : 1955    Room: [address] [place] [address]      Evaluating OT: Carmina Reyes OTR/L   [id number]      Referring Provider:Get Reyes, APRN - CNP    Specific Provider Orders/Date:OT eval and treat 2022      Diagnosis:  Osteoarthritis, unspecified osteoarthritis type, unspecified site [M19.90]  S/P total knee arthroplasty, left [Z96.652]    Surgery: L TKA      Pertinent Medical History: OA, R TKA     Precautions:  Fall Risk, WBAT L LE     Assessment of current deficits    [x] Functional mobility  [x]ADLs  [x] Strength               []Cognition    [x] Functional transfers   [x] IADLs         [x] Safety Awareness   [x]Endurance    [] Fine Coordination              [x] Balance      [] Vision/perception   []Sensation     []Gross Motor Coordination  [] ROM  [] Delirium                   [] Motor Control     OT PLAN OF CARE   OT POC based on physician orders, patient diagnosis and results of clinical assessment    Frequency/Duration  2-4 days/wk for 2 weeks PRN   Specific OT Treatment Interventions to include:   ADL retraining/adapted techniques and AE recommendations to increase functional independence within precautions                    Energy conservation techniques to improve tolerance for selfcare routine   Functional transfer/mobility training/DME recommendations for increased independence, safety and fall prevention         Patient/family education to increase safety and functional independence             Environmental modifications for safe mobility and completion of ADLs                             Therapeutic activity to improve functional performance during ADLs.                                          Therapeutic exercise to improve tolerance and functional strength for ADLs    Balance retraining/tolerance tasks for facilitation of postural control with dynamic challenges during ADLs .       Positioning to improve functional independence      Recommended Adaptive Equipment: bedside commode     Home Living: Pt lives with wife,  2 story with 3 steps, no rail to enter  bed/bath on 1st   Bathroom setup: walk in shower    Equipment owned: reachers    Prior Level of Function: wife assist  with lower body ADLs , assist with IADLs; ambulated with no device      Pain Level: no pain this session ;   Cognition: A&O: 4/4;    Memory:  good    Sequencing:  good    Problem solving:  good    Judgement/safety:  good      Functional Assessment:  AM-PAC Daily Activity Raw Score: 18/24   Initial Eval Status  Date: 7/21/22 Treatment Status  Date: STGs = LTGs  Time frame: 10-14 days   Feeding Independent      Grooming Set-up   Independent    UB Dressing Set-up   Independent   LB Dressing Min  A    Educated and instructed on lower body AE   Patient able to don/doff sock with AE SBA   Patient demonstrated good understanding   LE issued   (Soc aide, LH shoe horn, compression sleeve) has reacher at home   Mod I    Bathing SBA   Mod I    Toileting Supervision   Able to manage brief over hips   (Some incontinence after surgery )  Independent    Bed Mobility  Supervision   Supine to sit   Mod I    Functional Transfers SBA  Sit- stand from bed   Mod I    Functional Mobility SBA,w/walker   Household distance in hallway   Mod I  with good tolerance    Balance Sitting:     Static:  Independent    Dynamic:SBA   Standing: SBA   Independent    Activity Tolerance No SOB   Good  with ADL activity    Visual/  Perceptual Glasses: none by bedside                 Hand Dominance right   AROM (PROM) Strength Additional Info:    RUE  WFL WFL good  and wfl FMC/dexterity noted during ADL tasks       [facility] WFL good  and wfl FMC/dexterity noted during ADL tasks Hearing: WFL   Sensation:  No c/o numbness or tingling  Tone: WFL   Edema: none observed     Comments: Upon arrival patient lying in bed . At end of session, patient sitting in chair  with call light and phone within reach, all lines and tubes intact. Wife present     Overall patient demonstrated  decreased independence and safety during completion of ADL/functional transfer/mobility tasks. Pt would benefit from continued skilled OT to increase safety and independence with completion of ADL/IADL tasks for functional independence and quality of life. Comments/Treatment:      Patient practiced and was instructed on following during evaluation and OT session:          -Bed mobility - technique and safety         -Tranfers - hand placement, tech and safety         -Mobility - safety, and tech with  a device         -ADLs - tech, AE if appropriate/needed  - educated and instructed          -Education: , importance of OOB activity and participating in ADLs, safety awareness, AE             Rehab Potential: good for established goals     Patient / Family Goal: return home      Patient and/or family were instructed on functional diagnosis, prognosis/goals and OT plan of care. Demonstrated good  understanding. Eval Complexity: Low    Time In: 1445  Time Out: 1510  Total Treatment Time: 12    Min Units   OT Eval Low 97165 x  1   OT Eval Medium 25328      OT Eval High 93231      OT Re-Eval [id number]       Therapeutic Ex 08528      Therapeutic Activities 38794       ADL/Self Care 84033  12  1   Orthotic Management 59621       Manual 46178     Neuro Re-Ed 13329       Non-Billable Time          Evaluation Time additionally includes thorough review of current medical information, gathering information on past medical history/social history and prior level of function, interpretation of standardized testing/informal observation of tasks, assessment of data and development of plan of care and goals.             Kala Puga

## 2022-07-21 NOTE — ANESTHESIA PROCEDURE NOTES
Spinal Block    Patient location during procedure: OR  Reason for block: primary anesthetic  Staffing  Anesthesiologist: Valente Moreno MD  Spinal Block  Patient position: sitting  Prep: ChloraPrep  Patient monitoring: cardiac monitor, continuous pulse ox, continuous capnometry, frequent blood pressure checks and oxygen  Approach: midline  Location: L3/L4  Provider prep: sterile gloves and mask  Needle  Needle type: Quincke   Needle gauge: 21 G  Needle length: 3.5 in  Needle insertion depth: 5 cm  Assessment  Events: cerebrospinal fluid  Attempts: 1  Hemodynamics: stable  Preanesthetic Checklist  Completed: patient identified, IV checked, site marked, risks and benefits discussed, surgical/procedural consents, equipment checked, pre-op evaluation, timeout performed, anesthesia consent given, oxygen available, monitors applied/VS acknowledged, fire risk safety assessment completed and verbalized and blood product R/B/A discussed and consented

## 2022-07-21 NOTE — PROGRESS NOTES
CLINICAL PHARMACY NOTE: MEDS TO BEDS    Total # of Prescriptions Filled: 2   The following medications were delivered to the patient:  Aspirin 81 mg  Percocet 5-325 mg    Additional Documentation:

## 2022-07-21 NOTE — ANESTHESIA POSTPROCEDURE EVALUATION
Department of Anesthesiology  Postprocedure Note    Patient: Cathy Berg  MRN: [id number]  YOB: 1955  Date of evaluation: 7/21/2022      Procedure Summary     Date: 07/21/22 Room / Location: [facility] OR 05 / [facility]    Anesthesia Start: 9407 Anesthesia Stop:     Procedure: LEFT KNEE LIA ROBOTIC ASSISTED TOTAL ARTHROPLASTY   +++BOGDAN+++   ++PNB++   (SAME DAY DISCHARGE) (Left) Diagnosis:       Osteoarthritis, unspecified osteoarthritis type, unspecified site      (OSTEOARTHRITIS)    Surgeons: Hema Jonas MD Responsible Provider: Supriya Bergman MD    Anesthesia Type: general, regional, spinal ASA Status: 3          Anesthesia Type: No value filed.     Lily Phase I: Lily Score: 7    Lily Phase II:        Anesthesia Post Evaluation    Patient location during evaluation: PACU  Patient participation: complete - patient participated  Level of consciousness: awake  Pain score: 3  Airway patency: patent  Nausea & Vomiting: no nausea and no vomiting  Complications: no  Cardiovascular status: blood pressure returned to baseline  Respiratory status: acceptable  Hydration status: euvolemic

## 2022-07-21 NOTE — PROGRESS NOTES
P Quality Flow/Interdisciplinary Rounds Progress Note        Quality Flow Rounds held on July 21, 2022    Disciplines Attending:  Bedside Nurse, , , and Nursing Unit [address] [place] [address] Taco Petit was admitted on 7/21/2022  5:30 AM    Anticipated Discharge Date:       Disposition:    Faustino Score:  Faustino Scale Score: 19    Readmission Risk              Risk of Unplanned Readmission:  0           Discussed patient goal for the day, patient clinical progression, and barriers to discharge.   The following Goal(s) of the Day/Commitment(s) have been identified:   Discharge [address] JOSE Barakat  July 21, 2022

## 2022-07-22 RX ORDER — ROPIVACAINE HYDROCHLORIDE 5 MG/ML
INJECTION, SOLUTION EPIDURAL; INFILTRATION; PERINEURAL
Status: DISCONTINUED | OUTPATIENT
Start: 2022-07-21 | End: 2022-07-22 | Stop reason: SDUPTHER

## 2022-07-22 NOTE — ADDENDUM NOTE
Addendum  created 07/22/22 1636 by Sandro Nascimento MD    Child order released for a procedure order, Clinical Note Signed, Diagnosis association updated, Intraprocedure Blocks edited, SmartForm saved

## 2022-07-22 NOTE — ANESTHESIA PROCEDURE NOTES
Peripheral Block    Patient location during procedure: procedure area  Reason for block: post-op pain management and at surgeon's request  Start time: 7/21/2022 7:05 AM  Staffing  Performed: anesthesiologist   Anesthesiologist: Alexis Mistry MD  Preanesthetic Checklist  Completed: patient identified, IV checked, site marked, risks and benefits discussed, surgical/procedural consents, equipment checked, pre-op evaluation, timeout performed, anesthesia consent given, oxygen available and monitors applied/VS acknowledged  Peripheral Block   Patient position: supine  Prep: ChloraPrep  Provider prep: mask and sterile gloves  Patient monitoring: cardiac monitor, continuous pulse ox, frequent blood pressure checks and IV access  Block type: Femoral  Laterality: right  Injection technique: single-shot  Guidance: ultrasound guided  Local infiltration: lidocaine  Infiltration strength: 1 %  Local infiltration: lidocaine  Dose: 3 mL    Needle   Needle gauge: 20 G  Needle localization: ultrasound guidance  Needle length: 10 cm  Assessment   Injection assessment: negative aspiration for heme, no paresthesia on injection and local visualized surrounding nerve on ultrasound  Paresthesia pain: none  Slow fractionated injection: yes  Hemodynamics: stable  Real-time US image taken/store: yes  Outcomes: patient tolerated procedure well    Additional Notes  U/S 76880. Time out performed.          Medications Administered  ropivacaine (NAROPIN) injection 0.5% - Perineural   30 mL - 7/21/2022 7:05:00 AM

## 2022-08-01 ENCOUNTER — OFFICE VISIT (OUTPATIENT)
Dept: ORTHOPEDIC SURGERY | Age: 67
End: 2022-08-01

## 2022-08-01 VITALS — WEIGHT: 260 LBS | HEIGHT: 68 IN | BODY MASS INDEX: 39.4 KG/M2

## 2022-08-01 DIAGNOSIS — Z96.652 S/P TOTAL KNEE REPLACEMENT, LEFT: Primary | ICD-10-CM

## 2022-08-01 PROCEDURE — 99024 POSTOP FOLLOW-UP VISIT: CPT | Performed by: ORTHOPAEDIC SURGERY

## 2022-08-01 NOTE — PROGRESS NOTES
Follow Up Post Operative Visit     Surgery: LEFT KNEE LIA ROBOTIC ASSISTED TOTAL ARTHROPLASTY  Date: 7/21/22    Subjective:    Meghan Sung is here for follow up visit s/p above procedure. He is doing well. He has been doing well overall. Reports that his left knee has been progressing slightly better than his right knee was at this point in healing. Reports that he is taking his aspirin as prescribed. Denies any current complaints and overall is pleased with his progress. Controlled Substances Monitoring:        Physical Exam:    Height: 5' 8\" (1.727 m), Weight: 260 lb (117.9 kg) (per pt)    General: Alert and oriented x3, no acute distress  Cardiovascular/pulmonary: No labored breathing, peripheral perfusion intact  Musculoskeletal:    Left lower extremity:  Incision healing well. No active drainage appreciated from the incision  No erythema about the knee  Compartments soft and compressible  Calf soft and non tender  +PF/DF/EHL  +2/4 DP & PT pulses, Brisk Cap refill, Toes warm and perfused  Distal sensation grossly intact to Peroneals, Sural, Saphenous, and tibial nrs  Knee range of motion 5-105 degrees    Imaging:   Xray: x-rays of the left knee were obtained today in the office and reviewed with the patient. 3 views: AP/lateral/sunrise view: demonstrate s/p left TKA. Hardware is in appropriate alignment no evidence of loosening. No acute osseous abnormalities. Impression: Stable s/p TKA    Assessment and Plan: 2 weeks s/p TKA  Discussed physical exam radiographic findings with the patient today. He is progressing well and is ahead of where he is expected to be. He should continue working on range of motion activities as well as strengthening. He is to begin outpatient PT at the end of this week or early next week. Sutures will be removed today at the tibial pin sites, otherwise continue local wound care as instructed.   Continue DVT prophylaxis and compression stocking to the left lower extremity. Patient is to follow-up in 4 weeks for repeat evaluation. All questions were sought and answered at today's visit. Electronically signed by Alyson Noyola DO on 8/1/2022 at 3:27 PM    Staff Addendum    I have seen and evaluated the patient and agree with the assessment and plan as documented by the orthopaedic surgery resident. I have performed the key components of the history and physical examination and concur with the findings and plan, and have made changes where appropriate/necessary.           Rianna Joiner MD  [address]

## 2022-08-01 NOTE — PROGRESS NOTES
Follow Up Post Operative Visit     Surgery: LEFT KNEE LIA ROBOTIC ASSISTED TOTAL ARTHROPLASTY  Date: 7/21/22    Subjective:    Lakshmi Salazar is here for follow up visit s/p above procedure. He is doing well.   He has been ***    Controlled Substances Monitoring:        Physical Exam:    No data recorded    General: Alert and oriented x3, no acute distress  Cardiovascular/pulmonary: No labored breathing, peripheral perfusion intact  Musculoskeletal:    ***      Imaging: ***    Assessment and Plan:  ***  ***    Karen Hurtado MD  Orthopaedic Surgery   8/1/22  2:46 PM

## 2022-08-03 DIAGNOSIS — Z96.652 S/P TOTAL KNEE REPLACEMENT, LEFT: Primary | ICD-10-CM

## 2022-08-03 RX ORDER — OXYCODONE HYDROCHLORIDE AND ACETAMINOPHEN 5; 325 MG/1; MG/1
1 TABLET ORAL EVERY 6 HOURS PRN
Qty: 28 TABLET | Refills: 0 | Status: SHIPPED | OUTPATIENT
Start: 2022-08-03 | End: 2022-08-10

## 2022-08-03 NOTE — TELEPHONE ENCOUNTER
Pt called into the office requesting a refill of his post operative pain medication. Perc 5 pended & routed for review.     Surgery: LEFT KNEE LIA ROBOTIC ASSISTED TOTAL ARTHROPLASTY  Date: 7/21/22

## 2022-08-09 NOTE — PROGRESS NOTES
[address] [facility]   Phone: [phone]   Fax: [phone]           Date:  8/10/2022   Patient: Richard Kaufman  : 1955  MRN: [id number]  Referring Provider: Roxane Claude, MD  [address]  [facility]     Medical Diagnosis:   C99.843 (ICD-10-CM) - S/P total knee replacement, left      SUBJECTIVE:     Surgical procedure: LEFT KNEE LIA ROBOTIC ASSISTED TOTAL ARTHROPLASTY    Date of surgery: 22    Services provided following surgery: home PT     History: Pt reports h/o bone on bone arthritis. Chief complaint:  stiffness. achiness    Behavior: condition is getting better    Pain:   Current: 2/10       Symptom Type/Quality: aching  Location[de-identified] Knee: anterior shin    Imaging results: XR KNEE LEFT (1-2 VIEWS)    Result Date: 2022  EXAMINATION: TWO XRAY VIEWS OF THE LEFT KNEE 2022 10:12 am COMPARISON: 2022 HISTORY: ORDERING SYSTEM PROVIDED HISTORY: post op TECHNOLOGIST PROVIDED HISTORY: Of operative side while in recovery room. Reason for exam:->post op FINDINGS: Anatomically aligned left TKA with moderate joint effusion and no abnormal osseous findings. Left TKA with moderate joint effusion. XR KNEE LEFT (3 VIEWS)    Result Date: 2022  Radiology exam is complete. No Radiologist dictation. Please follow up with ordering provider. XR KNEE BILATERAL STANDING    Result Date: 2022  Radiology exam is complete. No Radiologist dictation. Please follow up with ordering provider.        Past Medical History:  Past Medical History:   Diagnosis Date    Arthritis     Cancer ([address]     Dysphagia     recent dilation 2022    Hyperlipidemia     Hypertension     SHARYN on CPAP     Osteoarthritis      Past Surgical History:   Procedure Laterality Date    JOINT REPLACEMENT      R TKA    SHOULDER SURGERY Bilateral     shaved bone    TONSILLECTOMY AND ADENOIDECTOMY      TOTAL KNEE ARTHROPLASTY Left 2022    LEFT KNEE LIA ROBOTIC ASSISTED TOTAL ARTHROPLASTY performed by Baron Sarika MD at [address] [facility] ENDOSCOPY  06/2022    dilation       Medications:   Current Outpatient Medications   Medication Sig Dispense Refill    oxyCODONE-acetaminophen (PERCOCET) 5-325 MG per tablet Take 1 tablet by mouth every 6 hours as needed for Pain for up to 7 days. 28 tablet 0    aspirin EC 81 MG EC tablet Take 1 tablet by mouth in the morning and 1 tablet before bedtime. Do all this for 28 days. 56 tablet 0    Cholecalciferol (VITAMIN D) 50 MCG (2000 UT) CAPS capsule Take 1 capsule by mouth in the morning. Multiple Vitamins-Minerals (THERAPEUTIC MULTIVITAMIN-MINERALS) tablet Take 1 tablet by mouth in the morning. omeprazole (PRILOSEC) 20 MG delayed release capsule Take 20 mg by mouth daily      amLODIPine (NORVASC) 10 MG tablet Take 10 mg by mouth in the morning. venlafaxine (EFFEXOR XR) 150 MG extended release capsule Take 150 mg by mouth in the morning. No current facility-administered medications for this visit. Occupation: retired. Exercise regimen:  none    Hobbies: none     Patient Goals: be able to walk normally, and improve ROM     Precautions/Contraindications: recent surgery    OBJECTIVE:     Observations: Well nourished male with normal affect. Rises with 2 UE support  from chair. Ambulates with cane or no device. Inspection:    Incision: edges approximated, no purulent drainage, no swelling, no tenderness, slight pink and slight warmth compared to contralateral knee. Some pink/redness noted anterior shin. Pt reports slight tenderness to shin area. Pt reports improved since surgery. Slight increased temperature noted anterior shin as well. Contacted referring physician about, who reports pt has h/o venous stasis disease and no concerns at this time. Discussed with pt signs and symptoms, if knee/ leg becomes red, hot, swollen, or pt becomes fevered to contact physician immediately.   Pt demonstrates understanding. Edema: knee circumference  R:48 cm   L: 50.5 cm    Gait: slight antalgic     Joint/Motion:    Knee:  Right:   AROM: 110° Flexion,  0° Extension    Left:   AROM: 100° Flexion,  lacks 6° Extension      Strength:    Knee:   Right: Flexion 5/5,  Extension 5/5  Left: Flexion 4+/5,  Extension 4/+5    Palpation: slight tender to palpation anterior shin. ASSESSMENT     Outcome Measure:   Lower Extremity Functional Scale (LEFS) 54/80  impairment    Problems:   Pain reported 2/10  ROM decreased  Strength decreased  Decreased functional ability with walking, stairs, standing    [x] There are no barriers affecting plan of care or recovery    [] Barriers to this patient's plan of care or recovery include. Domestic Concerns:  [x] No  [] Yes:    Short Term goals (2-3 weeks)  Decrease reported pain to 1/10  Increase ROM to 0-105  Increase Strength to 5-/5   Able to perform/complete the following functions/tasks: pt able to perform 10 sit to stands with 1 UE support with min pain/limitation. Pt able to walk 20+ minutes with min pain/limitation. Pt able to go up/down flight of steps with min pain/limitation. Lower Extremity Functional Scale (LEFS) 60/80 impairment    Long Term goals (4-6 weeks)  Decrease reported pain to 0/10  Increase ROM to 0-120  Increase Strength to 5/5   Able to perform/complete the following functions/tasks: pt able to perform 10 sit to stands without UE support with no pain/limitation. Pt able to walk 40+ minutes with no pain/limitation. Pt able to go up/down flight of steps reciprocally with no pain/limitation.    Independent with Home Exercise Programs  Lower Extremity Functional Scale (LEFS) 65/80 impairment    Rehab Potential: [x] Good  [] Fair  [] Poor    PLAN       Treatment Plan:  [x] Therapeutic Exercise  [x] Therapeutic Activity  [x] Neuromuscular Re-education   [x] Gait Training  [x] Balance Training  [x] Aerobic conditioning  [x] Manual Therapy  [x] Massage/Fascial release   [] Work/Sport specific activities    [] Pain Neuroscience [x] Cold/hotpack  [] Vasocompression  [x] Electrical Stimulation  [] Lumbar/Cervical Traction  [x] Ultrasound   [] Iontophoresis: 4 mg/mL Dexamethasone Sodium Phosphate 40-80 mAmin        [x] Instruction in HEP      []  Medication allergies reviewed for use of Dexamethasone Sodium Phosphate 4mg/ml  with iontophoresis treatments. Patient is not allergic. The following CPT codes are likely to be used in the care of this patient: 455 1011 PT Evaluation: Low Complexity   61326 PT Re-Evaluation   [address] Neuromuscular Re-Education   34406 Therapeutic Activities   51556 Manual Therapy   09492 Gait Training    Electrical Stimulation  81916 US      Suggested Professional Referral: [x] No  [] Yes:     Patient Education:  [x] Plans/Goals, Risks/Benefits discussed  [x] Home exercise program  Method of Education: [x] Verbal  [x] Demo  [x] Written  Comprehension of Education:  [x] Verbalizes understanding. [x] Demonstrates understanding. [] Needs Review. [] Demonstrates/verbalizes understanding of HEP/Ed previously given. Frequency:  2 days per week for 4-6 weeks    Patient understands diagnosis/prognosis and consents to treatment, plan and goals: [x] Yes    [] No     Thank you for the opportunity to work with your patient. If you have questions or comments, please contact me at numbers listed above. Electronically signed by: Daly Ramsey, PT DPT, PT [id number]         Please sign Physician's Certification and return to: [address]  [address] [phone]  Dept: [phone]  Dept Fax: [phone]:  Certification / Comments     Frequency/Duration 2 days per week for 4-6 weeks. Certification period from 8/10/2022  to 9/23/2022.     I have reviewed the Plan of Care established for skilled therapy services and certify that the services are required and that they will be provided while the patient is under my care.     Physician's Comments/Revisions:               Physician's Printed Name:                                           [de-identified] Signature:                                                               Date:

## 2022-08-10 ENCOUNTER — EVALUATION (OUTPATIENT)
Dept: PHYSICAL THERAPY | Age: 67
End: 2022-08-10
Payer: MEDICARE

## 2022-08-10 DIAGNOSIS — Z96.652 S/P TOTAL KNEE REPLACEMENT, LEFT: Primary | ICD-10-CM

## 2022-08-10 PROCEDURE — 97110 THERAPEUTIC EXERCISES: CPT | Performed by: PHYSICAL THERAPIST

## 2022-08-10 PROCEDURE — 97161 PT EVAL LOW COMPLEX 20 MIN: CPT | Performed by: PHYSICAL THERAPIST

## 2022-08-10 NOTE — PROGRESS NOTES
[address] [facility]   Phone: [phone]   Fax: [phone]      Physical Therapy Daily Treatment Note    Date: 8/10/2022  Patient Name: Alexi Marquez  : 1955   MRN: [id number]  DOInjury: years  DOSx: 2022   Referring Provider: Michelle Nolasco MD  [address] [facility]     Medical Diagnosis:   M31.482 (ICD-10-CM) - S/P total knee replacement, left      Outcome Measure:  LEFS 54/80     S: See eval  O: Mod edema  Time  4140-7058       Visit   Repeat outcome measure at mid point and end. Pain    2/10     ROM  Knee:  Right:   AROM: 110° Flexion,  0° Extension     Left:   AROM: 100° Flexion,  lacks 6° Extension     Modalities       Ice, compression, elevation      Stretching       Patella mobs      Prone hangs      Heel props      Knee flex stretch-seated      Prone self flexion stretch      Exercise       Bike Next     Quad sets 5 sec hold x 20 reps HEP te   Heel slides X 20 in between exercises  HEP te   SLR 2 x 10  HEP te   Marching       Sidekicks      Squat       Step-ups - FWD       Step-ups - LAT      Step-ups - BWD       Step up and over reciprocally       TG squats      TG Calf raises       LAQ             NMR For safe ambulation in community and home    Marching gait      Side stepping      Retrowalk      Heel to toe      A: Tolerated well. Above added to written HEP.    P: Continue with rehab plan  Irvin Osullivan, PT DPT, PT [id number]    Treatment Charges: Mins Units   Initial Evaluation 24 1   Re-Evaluation     Ther Exercise         TE 10 1   Manual Therapy     MT     Ther Activities        TA     Gait Training          GT     Neuro Re-education NR     Modalities     Non-Billable Service Time     Other     Total Time/Units 34 2

## 2022-08-16 ENCOUNTER — TREATMENT (OUTPATIENT)
Dept: PHYSICAL THERAPY | Age: 67
End: 2022-08-16
Payer: MEDICARE

## 2022-08-16 DIAGNOSIS — Z96.652 S/P TOTAL KNEE REPLACEMENT, LEFT: Primary | ICD-10-CM

## 2022-08-16 PROCEDURE — 97110 THERAPEUTIC EXERCISES: CPT

## 2022-08-16 PROCEDURE — 97530 THERAPEUTIC ACTIVITIES: CPT

## 2022-08-16 NOTE — PROGRESS NOTES
[address] [facility]   Phone: [phone]   Fax: [phone]      Physical Therapy Daily Treatment Note    Date: 2022  Patient Name: Eliazar Spence  : 1955   MRN: [id number]  DOInjury: years  DOSx: 2022   Referring Provider: Baron Sarika MD  [address] [place] [address]     Medical Diagnosis:   D61.274 (ICD-10-CM) - S/P total knee replacement, left      Outcome Measure:  LEFS 54/80     S: Pt reports no pain today, but \"stiffness\". O:   Time  [id number]      Visit   Repeat outcome measure at mid point and end. Pain    0/10     ROM  Knee:  Right:   AROM: 110° Flexion,  0° Extension     Left:   AROM: 100° Flexion,  lacks 6° Extension     Modalities       Ice, compression, elevation      Stretching       Patella mobs      Prone hangs      Heel props      Knee flex stretch-seated      Prone self flexion stretch      Exercise       Bike 5 min Partial revolutions    Quad sets 5 sec hold x 20 reps HEP te   Heel slides X 20 in between exercises  HEP te   SLR 2 x 10  HEP te   SAQ 2 x 10     Marching       Sit to stand  2 x 10     Hip extension standing 2 x 10 B     Hip abd standing 2 x 10 B     Step-ups - FWD  2 x 10 6 inch     Step-ups - LAT      Step-ups - BWD       Step up and over reciprocally       TG squats      TG Calf raises 2 x 10      LAQ       2 x 10      NMR For safe ambulation in community and home    Marching gait      Side stepping      Retrowalk      Heel to toe      A: Tolerated well. C/o of improved mobility following treatment. Minimal pain 2/10. P: Continue with rehab plan. Add to HEP next session.      Erik Segura, PTA 52000    Treatment Charges: Mins Units   Initial Evaluation     Re-Evaluation     Ther Exercise         TE 30 2   Manual Therapy     MT     Ther Activities        TA 8 1   Gait Training          GT     Neuro Re-education NR     Modalities     Non-Billable Service Time     Other     Total Time/Units 38 3

## 2022-08-17 ASSESSMENT — PROMIS GLOBAL HEALTH SCALE
IN GENERAL, HOW WOULD YOU RATE YOUR SATISFACTION WITH YOUR SOCIAL ACTIVITIES AND RELATIONSHIPS [ON A SCALE OF 1 (POOR) TO 5 (EXCELLENT)]?: 4
SUM OF RESPONSES TO QUESTIONS 2, 4, 5, & 10: 15
SUM OF RESPONSES TO QUESTIONS 3, 6, 7, & 8: 13
IN THE PAST 7 DAYS, HOW OFTEN HAVE YOU BEEN BOTHERED BY EMOTIONAL PROBLEMS, SUCH AS FEELING ANXIOUS, DEPRESSED, OR IRRITABLE [ON A SCALE FROM 1 (NEVER) TO 5 (ALWAYS)]?: 3
IN GENERAL, HOW WOULD YOU RATE YOUR MENTAL HEALTH, INCLUDING YOUR MOOD AND YOUR ABILITY TO THINK [ON A SCALE OF 1 (POOR) TO 5 (EXCELLENT)]?: 4
IN GENERAL, WOULD YOU SAY YOUR QUALITY OF LIFE IS...[ON A SCALE OF 1 (POOR) TO 5 (EXCELLENT)]: 4
IN GENERAL, PLEASE RATE HOW WELL YOU CARRY OUT YOUR USUAL SOCIAL ACTIVITIES (INCLUDES ACTIVITIES AT HOME, AT WORK, AND IN YOUR COMMUNITY, AND RESPONSIBILITIES AS A PARENT, CHILD, SPOUSE, EMPLOYEE, FRIEND, ETC) [ON A SCALE OF 1 (POOR) TO 5 (EXCELLENT)]?: 4
IN GENERAL, WOULD YOU SAY YOUR HEALTH IS...[ON A SCALE OF 1 (POOR) TO 5 (EXCELLENT)]: 4
IN GENERAL, HOW WOULD YOU RATE YOUR PHYSICAL HEALTH [ON A SCALE OF 1 (POOR) TO 5 (EXCELLENT)]?: 3
IN THE PAST 7 DAYS, HOW WOULD YOU RATE YOUR FATIGUE ON AVERAGE [ON A SCALE FROM 1 (NONE) TO 5 (VERY SEVERE)]?: 4
HOW IS THE PROMIS V1.1 BEING ADMINISTERED?: 2
TO WHAT EXTENT ARE YOU ABLE TO CARRY OUT YOUR EVERYDAY PHYSICAL ACTIVITIES SUCH AS WALKING, CLIMBING STAIRS, CARRYING GROCERIES, OR MOVING A CHAIR [ON A SCALE OF 1 (NOT AT ALL) TO 5 (COMPLETELY)]?: 5
IN THE PAST 7 DAYS, HOW WOULD YOU RATE YOUR PAIN ON AVERAGE [ON A SCALE FROM 0 (NO PAIN) TO 10 (WORST IMAGINABLE PAIN)]?: 1

## 2022-08-17 ASSESSMENT — KOOS JR
STANDING UPRIGHT: 0
GOING UP OR DOWN STAIRS: 0
KOOS JR TOTAL INTERVAL SCORE: 79.914
RISING FROM SITTING: 0
BENDING TO THE FLOOR TO PICK UP OBJECT: 0
HOW SEVERE IS YOUR KNEE STIFFNESS AFTER FIRST WAKING IN MORNING: 2
STRAIGHTENING KNEE FULLY: 0
TWISING OR PIVOTING ON KNEE: 1

## 2022-08-18 ENCOUNTER — TREATMENT (OUTPATIENT)
Dept: PHYSICAL THERAPY | Age: 67
End: 2022-08-18
Payer: MEDICARE

## 2022-08-18 DIAGNOSIS — Z96.652 S/P TOTAL KNEE REPLACEMENT, LEFT: Primary | ICD-10-CM

## 2022-08-18 PROCEDURE — 97530 THERAPEUTIC ACTIVITIES: CPT

## 2022-08-18 PROCEDURE — 97110 THERAPEUTIC EXERCISES: CPT

## 2022-08-18 NOTE — PROGRESS NOTES
[address] [facility]   Phone: [phone]   Fax: [phone]      Physical Therapy Daily Treatment Note    Date: 2022  Patient Name: Thelma Campso  : 1955   MRN: [id number]  DOInjury: years  DOSx: 2022   Referring Provider: No referring provider defined for this encounter. Medical Diagnosis:   Z96.652 (ICD-10-CM) - S/P total knee replacement, left      Outcome Measure:  LEFS 54/80     S: Pt reports no pain in knee today, however reports increased pain in L hip. O:   Time  [id number]      Visit  3/12 Repeat outcome measure at mid point and end. Pain    0/10     ROM  Knee:  Right:   AROM: 110° Flexion,  0° Extension     Left:   AROM: 100° Flexion,  lacks 6° Extension     Modalities       Ice, compression, elevation      Stretching       Patella mobs      Prone hangs      Heel props      Knee flex stretch-seated      Prone self flexion stretch      Exercise       Bike 8 min Partial revolutions to full revolutions    Quad sets 5 sec hold x 20 reps HEP te   Heel slides X 20 in between exercises  HEP te   SLR 2 x 10  HEP te   SAQ 2 x 10     Marching       Sit to stand  2 x 10     Hip extension standing 2 x 10 B     Hip abd standing 2 x 10 B     Step-ups - FWD  2 x 10 6 inch     Step-ups - LAT      Step-ups - BWD       Step up and over reciprocally       TG squats      TG Calf raises 2 x 10      LAQ       2 x 10      NMR For safe ambulation in community and home    Marching gait      Side stepping      Retrowalk      Heel to toe      A: Tolerated well. Continued to c/o hip pain throughout treatment. P: Continue with rehab plan.      Arnold Swain, PTA 22205    Treatment Charges: Mins Units   Initial Evaluation     Re-Evaluation     Ther Exercise         TE 30 2   Manual Therapy     MT     Ther Activities        TA 11 1   Gait Training          GT     Neuro Re-education NR     Modalities     Non-Billable Service Time     Other     Total Time/Units 41 3

## 2022-08-23 ENCOUNTER — TREATMENT (OUTPATIENT)
Dept: PHYSICAL THERAPY | Age: 67
End: 2022-08-23
Payer: MEDICARE

## 2022-08-23 DIAGNOSIS — Z96.652 S/P TOTAL KNEE REPLACEMENT, LEFT: Primary | ICD-10-CM

## 2022-08-23 PROCEDURE — 97110 THERAPEUTIC EXERCISES: CPT

## 2022-08-23 NOTE — PROGRESS NOTES
[address] [facility]   Phone: [phone]   Fax: [phone]      Physical Therapy Daily Treatment Note    Date: 2022  Patient Name: Rocky Dye  : 1955   MRN: [id number]  DOInjury: years  DOSx: 2022   Referring Provider: Alex Carroll MD  [address] [facility]     Medical Diagnosis:   Y90.189 (ICD-10-CM) - S/P total knee replacement, left      Outcome Measure:  LEFS 54/80     S: Pt reports pain in the knee has increased significantly in the last several days 5-6/10 today. Slight redness noticed around the incision and is warm to the touch. Pt reports tenderness to palpation along joint line medial and laterally. Also continues to have increase pain in the L hip. Recommended that pt call and inform surgeons office with concerns. O:   Time  4903-8548      Visit   Repeat outcome measure at mid point and end. Pain    0/10     ROM  Knee:  Right:   AROM: 110° Flexion,  0° Extension     Left:   AROM: 100° Flexion,  lacks 6° Extension     Modalities       Ice, compression, elevation      Stretching       Patella mobs      Prone hangs      Heel props      Knee flex stretch-seated      Prone self flexion stretch      Exercise       Bike Partial revolutions to full revolutions    Quad sets 5 sec hold x 10 reps HEP te   Heel slides X 20 in between exercises  HEP te   SLR 2 x 10  HEP te   SAQ 2 x 10     Marching       Sit to stand  2 x 5 Blue foam    Hip extension standing 2 x 10 B     Hip abd standing 2 x 10 B     Step-ups - FWD  6 inch     Step-ups - LAT      Step-ups - BWD       Step up and over reciprocally       TG squats      TG Calf raises 2 x 10      LAQ       2 x 10      NMR For safe ambulation in community and home    Marching gait      Side stepping      Retrowalk      Heel to toe      A: Tolerated well. Pt reports pain significant in the hip and only tolerated 10 reps. Significant knee pain requiring frequent rest breaks.  Pt instructed to ice and elevate at home. Pt reports pain is the same following treatment as it was on arrival.     P: Continue with rehab plan.      Maribell Castellon, PTA 49902    Treatment Charges: Mins Units   Initial Evaluation     Re-Evaluation     Ther Exercise         TE 40 3   Manual Therapy     MT     Ther Activities        TA     Gait Training          GT     Neuro Re-education NR     Modalities     Non-Billable Service Time     Other     Total Time/Units 40 3

## 2022-08-25 ENCOUNTER — TREATMENT (OUTPATIENT)
Dept: PHYSICAL THERAPY | Age: 67
End: 2022-08-25
Payer: MEDICARE

## 2022-08-25 DIAGNOSIS — Z96.652 S/P TOTAL KNEE REPLACEMENT, LEFT: Primary | ICD-10-CM

## 2022-08-25 PROCEDURE — 97530 THERAPEUTIC ACTIVITIES: CPT

## 2022-08-25 PROCEDURE — 97110 THERAPEUTIC EXERCISES: CPT

## 2022-08-25 NOTE — PROGRESS NOTES
[address] [facility]   Phone: [phone]   Fax: [phone]      Physical Therapy Daily Treatment Note    Date: 2022  Patient Name: Earlene Sher  : 1955   MRN: [id number]  DOInjury: years  DOSx: 2022   Referring Provider: Josafat Randolph MD  [address] [facility]     Medical Diagnosis:   P04.791 (ICD-10-CM) - S/P total knee replacement, left      Outcome Measure:  LEFS 54/80     S: Pt reports pain in the L knee today is 4/10 and no pain in L hip. O:   Time  8687-0459      Visit  12 Repeat outcome measure at mid point and end. Pain    0/10     ROM  Knee:  Right:   AROM: 110° Flexion,  0° Extension     Left:   AROM: 105° Flexion,  lacks 2° Extension     Modalities       Ice, compression, elevation      Stretching       Patella mobs      Prone hangs      Heel props      Knee flex stretch-seated      Prone self flexion stretch      Exercise       Bike 8 min Partial revolutions to full revolutions    Quad sets 5 sec hold x 10 reps HEP te   Heel slides X 20 in between exercises  HEP te   SLR 2 x 10  HEP te   SAQ 2 x 10     bridges 2 x 10 with 3s  ta   Sit to stand  2 x 5    Hip extension standing 2 x 10 B     Hip abd standing 2 x 10 B     Step-ups - FWD  2 x 10 6 inch  ta   Step-ups - LAT      Step-ups - BWD       TKE 2 x 10 OTB    HS curls  2 x 10 OTB    TG Calf raises 2 x 10      LAQ       2 x 10      NMR For safe ambulation in community and home    Marching gait      Side stepping      Retrowalk      Heel to toe      A: Tolerated well. Pt was able to perform therex with less limitation this date secondary to improved pain. Pt instructed in ice and elevation once home. P: Continue with rehab plan.      Garth Dash, PTA 91235    Treatment Charges: Mins Units   Initial Evaluation     Re-Evaluation     Ther Exercise         TE 29 2   Manual Therapy     MT     Ther Activities        TA 10 1   Gait Training          GT     Neuro Re-education NR Modalities     Non-Billable Service Time     Other     Total Time/Units 39 3

## 2022-09-06 ENCOUNTER — TREATMENT (OUTPATIENT)
Dept: PHYSICAL THERAPY | Age: 67
End: 2022-09-06
Payer: MEDICARE

## 2022-09-06 DIAGNOSIS — Z96.652 S/P TOTAL KNEE REPLACEMENT, LEFT: Primary | ICD-10-CM

## 2022-09-06 PROCEDURE — 97530 THERAPEUTIC ACTIVITIES: CPT

## 2022-09-06 PROCEDURE — 97110 THERAPEUTIC EXERCISES: CPT

## 2022-09-06 NOTE — PROGRESS NOTES
[address] [facility]   Phone: [phone]   Fax: [phone]      Physical Therapy Daily Treatment Note    Date: 2022  Patient Name: Nitin Dunbar  : 1955   MRN: [id number]  DOInjury: years  DOSx: 2022   Referring Provider: Enid Hummel MD  [address]     Medical Diagnosis:   R82.539 (ICD-10-CM) - S/P total knee replacement, left      Outcome Measure:  LEFS 54/80     S: Pt reports pain in the L knee is 2/10, \"aching rather than true pain\". Reports that going up the steps seems to be the only thing that is not \"back to normal\"    O:   Time  5798-3856      Visit   Repeat outcome measure at mid point and end. Pain    0/10     ROM  Knee:  Right:   AROM: 110° Flexion,  0° Extension     Left:   AROM: 105° Flexion,  lacks 2° Extension     Modalities       Ice, compression, elevation      Stretching       Patella mobs      Prone hangs      Heel props      Knee flex stretch-seated      Prone self flexion stretch      Exercise       Bike 8 min Partial revolutions to full revolutions te   Quad sets 5 sec hold x 10 reps HEP te   Heel slides X 20 in between exercises  HEP te   SLR 2 x 10  HEP te   SAQ     bridges 2 x 10 with 3s  ta   Sit to stand  2 x 5 ta   Hip extension standing 2 x 10 B 2# on L     Hip abd standing 2 x 10 B 2# on L te   Step-ups - FWD  2 x 10 8 inch  ta   Step-ups - LAT      Step-ups - BWD       TKE 2 x 10 OTB ta   HS curls  2 x 10 OTB te   TG Calf raises 2 x 10  te    LAQ       2 x 10 2# cuff wt     NMR For safe ambulation in community and home    Marching gait      Side stepping      Retrowalk      Heel to toe      A: Tolerated well. Pt reports aching in L knee following treatment. Required seated rest break prior to leaving secondary to fatigue. P: Continue with rehab plan.      [place], [place] [address]    Treatment Charges: Mins Units   Initial Evaluation     Re-Evaluation     Ther Exercise         TE 25 2 Manual Therapy     MT     Ther Activities        TA 15 1   Gait Training          GT     Neuro Re-education NR     Modalities     Non-Billable Service Time     Other     Total Time/Units 40 3

## 2022-09-12 ENCOUNTER — OFFICE VISIT (OUTPATIENT)
Dept: ORTHOPEDIC SURGERY | Age: 67
End: 2022-09-12

## 2022-09-12 DIAGNOSIS — Z96.652 S/P TOTAL KNEE REPLACEMENT, LEFT: Primary | ICD-10-CM

## 2022-09-12 PROCEDURE — 99024 POSTOP FOLLOW-UP VISIT: CPT | Performed by: NURSE PRACTITIONER

## 2022-09-12 NOTE — PROGRESS NOTES
Follow Up Post Operative Visit     Surgery: LEFT KNEE AIDEN ROBOTIC ASSISTED TOTAL ARTHROPLASTY  Date: 7/21/22    Subjective:    Nelly Maldonado is here for follow up visit s/p above procedure. He is doing well. He has been compliant with postoperative plan of care. He continues to progress with physical therapy as directed. He does report some persistent pain with long periods of ambulation. Controlled Substances Monitoring:        Physical Exam:    No data recorded    General: Alert and oriented x3, no acute distress  Cardiovascular/pulmonary: No labored breathing, peripheral perfusion intact  Musculoskeletal:    Left knee exam incision site healed mature scar present. Neurovascular sensation grossly intact. Range of motion 0-115, mild tenderness over anterior tibia. No swelling deformity, signs or symptoms of infection present. Imaging: No new imaging obtained today. Previous imaging reviewed    Assessment and Plan: Status post left Aiden robotic assisted total knee arthroplasty    Today we discussed his left knee. He is about 8 weeks out from procedure listed. Patient continues to progress with physical therapy as directed. He does voice some mild frustration with some persistent pain while ambulating for long periods of time. Reassured patient and wife today that this is still normal as he is still very early in his postoperative rehabilitation. He will continue to progress with physical therapy as directed. He will follow-up in 6 weeks at his 3-month postoperative date with Dr. Enrike Ryan with new imaging. Patient verbalized understanding.       CLARY Franco-CNP  Orthopedic Surgery   09/12/22  2:18 PM

## 2022-09-14 ENCOUNTER — TREATMENT (OUTPATIENT)
Dept: PHYSICAL THERAPY | Age: 67
End: 2022-09-14
Payer: MEDICARE

## 2022-09-14 DIAGNOSIS — Z96.652 S/P TOTAL KNEE REPLACEMENT, LEFT: Primary | ICD-10-CM

## 2022-09-14 PROCEDURE — 97530 THERAPEUTIC ACTIVITIES: CPT | Performed by: PHYSICAL THERAPIST

## 2022-09-14 PROCEDURE — 97110 THERAPEUTIC EXERCISES: CPT | Performed by: PHYSICAL THERAPIST

## 2022-09-14 NOTE — PROGRESS NOTES
[address] [facility]   Phone: [phone]   Fax: [phone]      Physical Therapy Daily Treatment Note    Date: 2022  Patient Name: Gunjan Atkins  : 1955   MRN: [id number]  DOInjury: years  DOSx: 2022   Referring Provider: Shaina Gee MD  [address]     Medical Diagnosis:   Q52.241 (ICD-10-CM) - S/P total knee replacement, left      Outcome Measure:  LEFS 54/80     S: Pt reports pain in the L knee is 2-3/10. Reports has been up for awhile already today. O:   Time  6739-3266       Visit   Repeat outcome measure at mid point and end. Pain  See above. ROM  Knee:  Right:   AROM: 110° Flexion,  0° Extension     Left:   AROM: 115° Flexion,  0° Extension     Modalities       Ice, compression, elevation      Stretching       Patella mobs      Prone hangs      Heel props      Knee flex stretch-seated      Prone self flexion stretch      Exercise       Bike 10 minutes   full revolutions te   Quad sets 5 sec hold x 10 reps HEP te   Heel slides X 20 in between exercises  HEP te   SLR 2 x 10  HEP te   SAQ     bridges 2 x 10 with 3s  ta   Sit to stand  ta   Hip extension standing 2 x 10 B 2# on L     Hip abd standing 2 x 10 B 2# on L te   Step-ups - FWD  2 x 10 8 inch  ta   Step-ups - LAT      Step-ups - BWD       TKE OTB ta   HS curls  2 x 10 OTB te   TG Calf raises 2 x 10  te    LAQ       2 x 10 2# cuff wt     NMR For safe ambulation in community and home    Marching gait      Side stepping      Retrowalk      Heel to toe      A: Tolerated well. P: Continue with rehab plan.      Faustino Watson, [place] [id number]    Treatment Charges: Mins Units   Initial Evaluation     Re-Evaluation     Ther Exercise         TE 30  2   Manual Therapy     MT     Ther Activities        TA 10  1   Gait Training          GT     Neuro Re-education NR     Modalities     Non-Billable Service Time     Other     Total Time/Units 40 3

## 2022-09-16 ENCOUNTER — TREATMENT (OUTPATIENT)
Dept: PHYSICAL THERAPY | Age: 67
End: 2022-09-16

## 2022-09-16 NOTE — PROGRESS NOTES
[address] [facility]   Phone: [phone]   Fax: [phone]      Physical Therapy Daily Treatment Note    Date: 2022  Patient Name: Mehdi Rodrigues  : 1955   MRN: [id number]  DOInjury: years  DOSx: 2022   Referring Provider: Azael El MD  [address]     Medical Diagnosis:   P88.678 (ICD-10-CM) - S/P total knee replacement, left      Outcome Measure:  LEFS 54/80     S: Pt reports pain in the L knee is 2-3/10. Reports has been up for awhile already today. O:   Time  2950-1132       Visit   Repeat outcome measure at mid point and end. Pain  See above. ROM  Knee:  Right:   AROM: 110° Flexion,  0° Extension     Left:   AROM: 115° Flexion,  0° Extension     Modalities       Ice, compression, elevation      Stretching       Patella mobs      Prone hangs      Heel props      Knee flex stretch-seated      Prone self flexion stretch      Exercise       Bike 10 minutes   full revolutions te   Quad sets 5 sec hold x 10 reps HEP te   Heel slides X 20 in between exercises  HEP te   SLR 2 x 10  HEP te   SAQ     bridges 2 x 10 with 3s  ta   Sit to stand  ta   Hip extension standing 2 x 10 B 2# on L     Hip abd standing 2 x 10 B 2# on L te   Step-ups - FWD  2 x 10 8 inch  ta   Step-ups - LAT      Step-ups - BWD       TKE OTB ta   HS curls  2 x 10 OTB te   TG Calf raises 2 x 10  te    LAQ       2 x 10 2# cuff wt     NMR For safe ambulation in community and home    Marching gait      Side stepping      Retrowalk      Heel to toe      A: Tolerated well. P: Continue with rehab plan.      Aurea Girdwood, PTA [id number]    Treatment Charges: Mins Units   Initial Evaluation     Re-Evaluation     Ther Exercise         TE 30  2   Manual Therapy     MT     Ther Activities        TA 10  1   Gait Training          GT     Neuro Re-education NR     Modalities     Non-Billable Service Time     Other     Total Time/Units 40 3

## 2022-10-17 ENCOUNTER — OFFICE VISIT (OUTPATIENT)
Dept: ORTHOPEDIC SURGERY | Age: 67
End: 2022-10-17
Payer: MEDICARE

## 2022-10-17 VITALS — WEIGHT: 250 LBS | BODY MASS INDEX: 39.24 KG/M2 | HEIGHT: 67 IN

## 2022-10-17 DIAGNOSIS — M25.552 LEFT HIP PAIN: ICD-10-CM

## 2022-10-17 DIAGNOSIS — Z96.652 S/P TOTAL KNEE REPLACEMENT, LEFT: Primary | ICD-10-CM

## 2022-10-17 PROCEDURE — 1123F ACP DISCUSS/DSCN MKR DOCD: CPT | Performed by: ORTHOPAEDIC SURGERY

## 2022-10-17 PROCEDURE — 20610 DRAIN/INJ JOINT/BURSA W/O US: CPT | Performed by: ORTHOPAEDIC SURGERY

## 2022-10-17 PROCEDURE — 99213 OFFICE O/P EST LOW 20 MIN: CPT | Performed by: ORTHOPAEDIC SURGERY

## 2022-10-17 RX ORDER — AMOXICILLIN 500 MG/1
CAPSULE ORAL
COMMUNITY
Start: 2022-07-26

## 2022-10-17 RX ORDER — ALBUTEROL SULFATE 90 UG/1
AEROSOL, METERED RESPIRATORY (INHALATION)
COMMUNITY
Start: 2022-08-24

## 2022-10-17 RX ORDER — BUPIVACAINE HYDROCHLORIDE 2.5 MG/ML
2 INJECTION, SOLUTION INFILTRATION; PERINEURAL ONCE
Status: COMPLETED | OUTPATIENT
Start: 2022-10-17 | End: 2022-10-18

## 2022-10-17 RX ORDER — TRIAMCINOLONE ACETONIDE 40 MG/ML
40 INJECTION, SUSPENSION INTRA-ARTICULAR; INTRAMUSCULAR ONCE
Status: COMPLETED | OUTPATIENT
Start: 2022-10-17 | End: 2022-10-18

## 2022-10-17 RX ORDER — OMEPRAZOLE 40 MG/1
CAPSULE, DELAYED RELEASE ORAL
COMMUNITY
Start: 2022-09-16

## 2022-10-17 NOTE — PROGRESS NOTES
Follow Up Post Operative Visit     Surgery: LEFT KNEE LIA ROBOTIC ASSISTED TOTAL ARTHROPLASTY  Date: 7/21/22    Subjective:    Stacey Jeffries is here for follow up visit s/p above procedure. He is doing well. He has completed physical therapy been continue with home exercise independently. He does report persistent pain to the lateral aspect of his left knee with daily activities. Patient verbalizes frustrations with continued pain today. Controlled Substances Monitoring:        Physical Exam:    No data recorded    General: Alert and oriented x3, no acute distress  Cardiovascular/pulmonary: No labored breathing, peripheral perfusion intact  Musculoskeletal:    Left knee exam range of motion 0-130, lateral joint line tenderness with palpation. Incision sites healed mature scar present. Valgus varus stress intact. Patella stable tracks midline. No swelling deformity or palpable effusion no signs or symptoms of infection    Left hip exam    Imaging: X-ray including 2 views left hip display maintained hip joint space negative for acute bony abnormality    X-ray including 4 views of the left knee show stable alignment of total knee arthroplasty without evidence of implant movement or acute complication. Procedure Note: Hip Cortisone injection     The left hip was identified as the injection site. The risk and benefits of a cortisone injection were explained and the patient consented to the injection. Under sterile conditions, the hip was injected over the greater trochanter and point of tenderness with a mixture of 40 mg of Kenalog and 2 mL of 0.25% bupivacaine without complication. A sterile bandage was applied.     Administrations This Visit       bupivacaine (MARCAINE) 0.25 % injection 5 mg       Admin Date  10/18/2022 Action  Given Dose  5 mg Route  Intra-artICUlar Administered By  [facility], [place]              triamcinolone acetonide (KENALOG-40) injection 40 mg       Admin Date  10/18/2022 Action  Given Dose  40 mg Route  Intra-artICUlar Administered By  Kaylee Chan MA                        Assessment and Plan: Status post left Aiden robotic assisted total knee arthroplasty, left hip trochanteric bursitis    Today we discussed his left knee and hip. He is 3 months out from procedure listed above. Patient does voice frustrations with continued pain following surgery. Reassured patient that he still early in the recovery and only 3 months out and pain at this point can still be common. On exam he has good range of motion of the left knee. New imaging obtained shows stable alignment of arthroplasty. He will continue with home exercises independently. Regards to his hip he is reporting lateral sided tenderness to his thigh. X-rays were obtained today showing maintained hip joint space with minimal degenerative changes. Patient was provided a left hip trochanteric bursa injection for symptom relief. Patient will follow-up in 3 months. CLARY Nowak-CNP  Orthopedic Surgery   10/24/22  10:57 AM      Staff Addendum    I have seen and evaluated the patient and agree with the assessment and plan as documented by Grecia De La Torre CNP. I have performed the key components of the history and physical examination and concur with the findings and plan, and have made changes where appropriate/necessary.             Jessica Hull MD  [address]

## 2022-10-18 RX ADMIN — TRIAMCINOLONE ACETONIDE 40 MG: 40 INJECTION, SUSPENSION INTRA-ARTICULAR; INTRAMUSCULAR at 12:08

## 2022-10-18 RX ADMIN — BUPIVACAINE HYDROCHLORIDE 5 MG: 2.5 INJECTION, SOLUTION INFILTRATION; PERINEURAL at 12:08

## 2022-11-01 ENCOUNTER — TELEPHONE (OUTPATIENT)
Dept: PHYSICAL THERAPY | Age: 67
End: 2022-11-01

## 2023-01-16 ENCOUNTER — OFFICE VISIT (OUTPATIENT)
Dept: ORTHOPEDIC SURGERY | Age: 68
End: 2023-01-16
Payer: MEDICARE

## 2023-01-16 VITALS — HEIGHT: 67 IN | BODY MASS INDEX: 39.24 KG/M2 | WEIGHT: 250 LBS

## 2023-01-16 DIAGNOSIS — Z96.652 S/P TOTAL KNEE REPLACEMENT, LEFT: Primary | ICD-10-CM

## 2023-01-16 PROCEDURE — 1123F ACP DISCUSS/DSCN MKR DOCD: CPT | Performed by: ORTHOPAEDIC SURGERY

## 2023-01-16 PROCEDURE — 99213 OFFICE O/P EST LOW 20 MIN: CPT | Performed by: ORTHOPAEDIC SURGERY

## 2023-01-16 NOTE — PROGRESS NOTES
Follow Up Post Operative Visit     Surgery: Left Aiden robotic assisted total knee arthroplasty  Date: 7/21/2022    Subjective:    Erasto Bull is here for follow up visit s/p above procedure. He is doing well. He has been resumed daily activities tolerating well. Has no chief complaints during today's visit. Controlled Substances Monitoring:        Physical Exam:    Height: 5' 7\" (1.702 m), Weight: 250 lb (113.4 kg)    General: Alert and oriented x3, no acute distress  Cardiovascular/pulmonary: No labored breathing, peripheral perfusion intact  Musculoskeletal:    Left knee exam incision site healed mature scar present. Neurovascular sensation gross intact range of motion 0-125, patella tracks midline. No signs or symptoms of infection. Imaging: X-ray including 4 views left knee showing stable alignment of total knee arthroplasty    Assessment and Plan: Status post left Aiden robotic assisted total knee arthroplasty    He is 6 months out from procedure listed above doing well. Has great range of motion on exam.  Knee is stable. Denies pain with daily activities. He will follow-up as needed. JANETTE Camargo  Orthopedic Surgery   01/16/23  1:45 PM    Staff Addendum    I have seen and evaluated the patient and agree with the assessment and plan as documented by Nils Tian CNP. I have performed the key components of the history and physical examination and concur with the findings and plan, and have made changes where appropriate/necessary.           Antoine Sweeney MD  [address] [place] [address]

## 2023-03-15 NOTE — PROGRESS NOTES
[facility]   Phone: [phone]   Fax: [phone]        Referring Provider: Alan Goncalves MD  [address]     Medical Diagnosis:   U49.421 (ICD-10-CM) - S/P total knee replacement, left      CERTIFICATION PERIOD: 8/10/2022  to 9/23/2022. ATTENDANCE:  Patient has attended 7  of 10  scheduled treatments from 8/10/2022  to 9/16/2022. TREATMENTS RECEIVED:  therapeutic exercise, therapeutic activity       COMMENTS AND RECOMMENDATIONS:   Pt no showed to last scheduled appointment. Pt was called multiple times to reschedule. Pt did not call back to reschedule. Will discharge pt at this time due to lapse in care. Recommend pt call with any questions or if chooses to resume therapy. Thank you for the opportunity to work with your patient. Kassi Banuelos, PT DPT [id number]    I CERTIFY THAT THE ABOVE REASSESSMENT AND PLAN OF CARE FOR PHYSICAL THERAPY SERVICES ARE APPROPRIATE AND MEDICALLY NECESSARY.       ________________________                _______________  Physician     Date

## 2023-04-17 ASSESSMENT — KOOS JR
TWISING OR PIVOTING ON KNEE: 0
STRAIGHTENING KNEE FULLY: 0
STANDING UPRIGHT: 0
BENDING TO THE FLOOR TO PICK UP OBJECT: 1
RISING FROM SITTING: 0
HOW SEVERE IS YOUR KNEE STIFFNESS AFTER FIRST WAKING IN MORNING: 1
KOOS JR TOTAL INTERVAL SCORE: 84.6
GOING UP OR DOWN STAIRS: 0

## 2023-04-17 ASSESSMENT — PROMIS GLOBAL HEALTH SCALE
IN GENERAL, HOW WOULD YOU RATE YOUR PHYSICAL HEALTH [ON A SCALE OF 1 (POOR) TO 5 (EXCELLENT)]?: 3
IN THE PAST 7 DAYS, HOW OFTEN HAVE YOU BEEN BOTHERED BY EMOTIONAL PROBLEMS, SUCH AS FEELING ANXIOUS, DEPRESSED, OR IRRITABLE [ON A SCALE FROM 1 (NEVER) TO 5 (ALWAYS)]?: 3
IN GENERAL, WOULD YOU SAY YOUR QUALITY OF LIFE IS...[ON A SCALE OF 1 (POOR) TO 5 (EXCELLENT)]: 3
IN GENERAL, HOW WOULD YOU RATE YOUR MENTAL HEALTH, INCLUDING YOUR MOOD AND YOUR ABILITY TO THINK [ON A SCALE OF 1 (POOR) TO 5 (EXCELLENT)]?: 3
SUM OF RESPONSES TO QUESTIONS 3, 6, 7, & 8: 17
IN GENERAL, HOW WOULD YOU RATE YOUR SATISFACTION WITH YOUR SOCIAL ACTIVITIES AND RELATIONSHIPS [ON A SCALE OF 1 (POOR) TO 5 (EXCELLENT)]?: 4
IN THE PAST 7 DAYS, HOW WOULD YOU RATE YOUR PAIN ON AVERAGE [ON A SCALE FROM 0 (NO PAIN) TO 10 (WORST IMAGINABLE PAIN)]?: 5
WHO IS THE PERSON COMPLETING THE PROMIS V1.1 SURVEY?: 0
HOW IS THE PROMIS V1.1 BEING ADMINISTERED?: 2
IN THE PAST 7 DAYS, HOW WOULD YOU RATE YOUR FATIGUE ON AVERAGE [ON A SCALE FROM 1 (NONE) TO 5 (VERY SEVERE)]?: 4
IN GENERAL, WOULD YOU SAY YOUR HEALTH IS...[ON A SCALE OF 1 (POOR) TO 5 (EXCELLENT)]: 3
SUM OF RESPONSES TO QUESTIONS 2, 4, 5, & 10: 13
IN GENERAL, PLEASE RATE HOW WELL YOU CARRY OUT YOUR USUAL SOCIAL ACTIVITIES (INCLUDES ACTIVITIES AT HOME, AT WORK, AND IN YOUR COMMUNITY, AND RESPONSIBILITIES AS A PARENT, CHILD, SPOUSE, EMPLOYEE, FRIEND, ETC) [ON A SCALE OF 1 (POOR) TO 5 (EXCELLENT)]?: 4
TO WHAT EXTENT ARE YOU ABLE TO CARRY OUT YOUR EVERYDAY PHYSICAL ACTIVITIES SUCH AS WALKING, CLIMBING STAIRS, CARRYING GROCERIES, OR MOVING A CHAIR [ON A SCALE OF 1 (NOT AT ALL) TO 5 (COMPLETELY)]?: 5

## 2024-01-31 ENCOUNTER — HOSPITAL ENCOUNTER (OUTPATIENT)
Dept: GENERAL RADIOLOGY | Age: 69
Discharge: HOME | End: 2024-02-02
Payer: MEDICARE

## 2024-01-31 DIAGNOSIS — R13.10 DYSPHAGIA, UNSPECIFIED TYPE: ICD-10-CM

## 2024-01-31 PROCEDURE — 92611 MOTION FLUOROSCOPY/SWALLOW: CPT

## 2024-01-31 PROCEDURE — 74230 X-RAY XM SWLNG FUNCJ C+: CPT

## 2024-01-31 PROCEDURE — 2500000003 HC RX 250 WO HCPCS: Performed by: RADIOLOGY

## 2024-01-31 PROCEDURE — 92526 ORAL FUNCTION THERAPY: CPT

## 2024-01-31 RX ADMIN — BARIUM SULFATE 70 ML: 0.81 POWDER, FOR SUSPENSION ORAL at 13:26

## 2024-01-31 RX ADMIN — BARIUM SULFATE 10 ML: 400 PASTE ORAL at 13:25

## 2024-01-31 RX ADMIN — BARIUM SULFATE 120 ML: 400 SUSPENSION ORAL at 13:26

## 2024-01-31 RX ADMIN — BARIUM SULFATE 1 TABLET: 700 TABLET ORAL at 13:27

## 2024-03-26 ENCOUNTER — APPOINTMENT (OUTPATIENT)
Dept: CT IMAGING | Age: 69
End: 2024-03-26
Payer: MEDICARE

## 2024-03-26 ENCOUNTER — APPOINTMENT (OUTPATIENT)
Dept: GENERAL RADIOLOGY | Age: 69
End: 2024-03-26
Payer: MEDICARE

## 2024-03-26 ENCOUNTER — HOSPITAL ENCOUNTER (EMERGENCY)
Age: 69
Discharge: HOME | End: 2024-03-27
Attending: EMERGENCY MEDICINE
Payer: MEDICARE

## 2024-03-26 VITALS
HEIGHT: 67 IN | WEIGHT: 250 LBS | RESPIRATION RATE: 17 BRPM | BODY MASS INDEX: 39.24 KG/M2 | SYSTOLIC BLOOD PRESSURE: 143 MMHG | OXYGEN SATURATION: 96 % | TEMPERATURE: 97.2 F | HEART RATE: 93 BPM | DIASTOLIC BLOOD PRESSURE: 85 MMHG

## 2024-03-26 DIAGNOSIS — R10.9 RIGHT FLANK PAIN: Primary | ICD-10-CM

## 2024-03-26 LAB
ALBUMIN SERPL-MCNC: 4.3 G/DL (ref 3.5–5.2)
ALP SERPL-CCNC: 96 U/L (ref 40–129)
ALT SERPL-CCNC: 43 U/L (ref 0–40)
ANION GAP SERPL CALCULATED.3IONS-SCNC: 12 MMOL/L (ref 7–16)
AST SERPL-CCNC: 31 U/L (ref 0–39)
BASOPHILS # BLD: 0.04 K/UL (ref 0–0.2)
BASOPHILS NFR BLD: 0 % (ref 0–2)
BILIRUB SERPL-MCNC: 0.6 MG/DL (ref 0–1.2)
BILIRUB UR QL STRIP: NEGATIVE
BUN SERPL-MCNC: 11 MG/DL (ref 6–23)
CALCIUM SERPL-MCNC: 9.1 MG/DL (ref 8.6–10.2)
CHLORIDE SERPL-SCNC: 103 MMOL/L (ref 98–107)
CLARITY UR: CLEAR
CO2 SERPL-SCNC: 27 MMOL/L (ref 22–29)
COLOR UR: YELLOW
CREAT SERPL-MCNC: 0.9 MG/DL (ref 0.7–1.2)
EOSINOPHIL # BLD: 0.08 K/UL (ref 0.05–0.5)
EOSINOPHILS RELATIVE PERCENT: 1 % (ref 0–6)
ERYTHROCYTE [DISTWIDTH] IN BLOOD BY AUTOMATED COUNT: 15.3 % (ref 11.5–15)
GFR SERPL CREATININE-BSD FRML MDRD: >90 ML/MIN/1.73M2
GLUCOSE SERPL-MCNC: 99 MG/DL (ref 74–99)
GLUCOSE UR STRIP-MCNC: NEGATIVE MG/DL
HCT VFR BLD AUTO: 44.4 % (ref 37–54)
HGB BLD-MCNC: 14.2 G/DL (ref 12.5–16.5)
HGB UR QL STRIP.AUTO: NEGATIVE
IMM GRANULOCYTES # BLD AUTO: 0.03 K/UL (ref 0–0.58)
IMM GRANULOCYTES NFR BLD: 0 % (ref 0–5)
KETONES UR STRIP-MCNC: NEGATIVE MG/DL
LACTATE BLDV-SCNC: 0.9 MMOL/L (ref 0.5–2.2)
LACTATE BLDV-SCNC: 3.3 MMOL/L (ref 0.5–2.2)
LEUKOCYTE ESTERASE UR QL STRIP: NEGATIVE
LIPASE SERPL-CCNC: 15 U/L (ref 13–60)
LYMPHOCYTES NFR BLD: 1.75 K/UL (ref 1.5–4)
LYMPHOCYTES RELATIVE PERCENT: 14 % (ref 20–42)
MCH RBC QN AUTO: 29.3 PG (ref 26–35)
MCHC RBC AUTO-ENTMCNC: 32 G/DL (ref 32–34.5)
MCV RBC AUTO: 91.5 FL (ref 80–99.9)
MONOCYTES NFR BLD: 1.18 K/UL (ref 0.1–0.95)
MONOCYTES NFR BLD: 10 % (ref 2–12)
MUCOUS THREADS URNS QL MICRO: PRESENT
NEUTROPHILS NFR BLD: 75 % (ref 43–80)
NEUTS SEG NFR BLD: 9.4 K/UL (ref 1.8–7.3)
NITRITE UR QL STRIP: NEGATIVE
PH UR STRIP: 7 [PH] (ref 5–9)
PLATELET # BLD AUTO: 244 K/UL (ref 130–450)
PMV BLD AUTO: 9.9 FL (ref 7–12)
POTASSIUM SERPL-SCNC: 4.1 MMOL/L (ref 3.5–5)
PROT SERPL-MCNC: 7.7 G/DL (ref 6.4–8.3)
PROT UR STRIP-MCNC: NEGATIVE MG/DL
RBC # BLD AUTO: 4.85 M/UL (ref 3.8–5.8)
RBC #/AREA URNS HPF: NORMAL /HPF
SODIUM SERPL-SCNC: 142 MMOL/L (ref 132–146)
SP GR UR STRIP: 1.02 (ref 1–1.03)
TROPONIN I SERPL HS-MCNC: 12 NG/L (ref 0–11)
UROBILINOGEN UR STRIP-ACNC: 0.2 EU/DL (ref 0–1)
WBC #/AREA URNS HPF: NORMAL /HPF
WBC OTHER # BLD: 12.5 K/UL (ref 4.5–11.5)

## 2024-03-26 PROCEDURE — 2580000003 HC RX 258

## 2024-03-26 PROCEDURE — 74176 CT ABD & PELVIS W/O CONTRAST: CPT

## 2024-03-26 PROCEDURE — 83690 ASSAY OF LIPASE: CPT

## 2024-03-26 PROCEDURE — 99284 EMERGENCY DEPT VISIT MOD MDM: CPT

## 2024-03-26 PROCEDURE — 96374 THER/PROPH/DIAG INJ IV PUSH: CPT

## 2024-03-26 PROCEDURE — 71046 X-RAY EXAM CHEST 2 VIEWS: CPT

## 2024-03-26 PROCEDURE — 80053 COMPREHEN METABOLIC PANEL: CPT

## 2024-03-26 PROCEDURE — 83605 ASSAY OF LACTIC ACID: CPT

## 2024-03-26 PROCEDURE — 85025 COMPLETE CBC W/AUTO DIFF WBC: CPT

## 2024-03-26 PROCEDURE — 84484 ASSAY OF TROPONIN QUANT: CPT

## 2024-03-26 PROCEDURE — 81001 URINALYSIS AUTO W/SCOPE: CPT

## 2024-03-26 RX ORDER — 0.9 % SODIUM CHLORIDE 0.9 %
1000 INTRAVENOUS SOLUTION INTRAVENOUS ONCE
Status: COMPLETED | OUTPATIENT
Start: 2024-03-26 | End: 2024-03-26

## 2024-03-26 RX ADMIN — SODIUM CHLORIDE 1000 ML: 9 INJECTION, SOLUTION INTRAVENOUS at 17:59

## 2024-03-26 ASSESSMENT — LIFESTYLE VARIABLES
HOW OFTEN DO YOU HAVE A DRINK CONTAINING ALCOHOL: NEVER
HOW MANY STANDARD DRINKS CONTAINING ALCOHOL DO YOU HAVE ON A TYPICAL DAY: PATIENT DOES NOT DRINK

## 2024-03-26 ASSESSMENT — PAIN - FUNCTIONAL ASSESSMENT: PAIN_FUNCTIONAL_ASSESSMENT: 0-10

## 2024-03-26 ASSESSMENT — PAIN SCALES - GENERAL: PAINLEVEL_OUTOF10: 8

## 2024-03-26 NOTE — ED PROVIDER NOTES
Department of Emergency Medicine     Written by: Blue Mosher MD  Patient Name: Bob Luther  Admit Date: 3/26/2024  5:39 PM  MRN: [id number]                   : 1955    HPI  Chief Complaint   Patient presents with    Abdominal Pain     RLQ pain    Flank Pain     Right, started this morning       Bob Luther is a 68 y.o. male that presents to the ED with sudden onset right lower abdominal pain and right flank pain that started over this morning.  It woke him up from sleep.  The patient is had some nausea however no vomiting.  Never had this in the past.  No history of stones.  No dysuria.  He last passed stool yesterday.  No history of abdominal surgeries.  He did have an unremarkable endoscopy in 2022.  No dysuria.  He says that he has had some shortness of breath over the last few months, and has been to follow-up with his primary care regarding this.     Review of systems:  Pertinent positives and negatives mentioned in the HPI/MDM.    Physical Exam  Constitutional:       General: He is not in acute distress.     Appearance: Normal appearance.   Eyes:      Extraocular Movements: Extraocular movements intact.      Pupils: Pupils are equal, round, and reactive to light.   Cardiovascular:      Rate and Rhythm: Normal rate and regular rhythm.   Pulmonary:      Effort: Pulmonary effort is normal. No respiratory distress.   Abdominal:      Palpations: Abdomen is soft.      Tenderness: There is abdominal tenderness. There is right CVA tenderness.      Hernia: No hernia is present.   Neurological:      Mental Status: He is alert and oriented to person, place, and time. Mental status is at baseline.        Chart review: The patient was seen by family medicine on 11/10/2023 for prediabetes and hyperlipidemia evaluation    Social determinants: family at bedside, social support available    Acute or chronic illness limiting or affecting care: Lactic acidosis requiring hydration and repeat lab

## 2024-03-27 LAB — TROPONIN I SERPL HS-MCNC: 13 NG/L (ref 0–11)

## 2024-03-27 PROCEDURE — 96374 THER/PROPH/DIAG INJ IV PUSH: CPT

## 2024-03-27 PROCEDURE — 6360000002 HC RX W HCPCS

## 2024-03-27 PROCEDURE — 84484 ASSAY OF TROPONIN QUANT: CPT

## 2024-03-27 RX ORDER — IBUPROFEN 600 MG/1
600 TABLET ORAL EVERY 8 HOURS PRN
Qty: 42 TABLET | Refills: 0 | Status: SHIPPED | OUTPATIENT
Start: 2024-03-27 | End: 2024-04-10

## 2024-03-27 RX ORDER — KETOROLAC TROMETHAMINE 15 MG/ML
15 INJECTION, SOLUTION INTRAMUSCULAR; INTRAVENOUS ONCE
Status: COMPLETED | OUTPATIENT
Start: 2024-03-27 | End: 2024-03-27

## 2024-03-27 RX ADMIN — KETOROLAC TROMETHAMINE 15 MG: 15 INJECTION, SOLUTION INTRAMUSCULAR; INTRAVENOUS at 01:14

## 2024-03-27 ASSESSMENT — PAIN SCALES - GENERAL: PAINLEVEL_OUTOF10: 8

## 2024-03-27 NOTE — DISCHARGE INSTRUCTIONS
Please use 600 mg ibuprofen every 8 hours for the next 48 hours.  Please have pills with meals as discussed.  After 48 hours, please take it as needed.  Please follow-up with your PCP.  Please return to the emergency room in case of emergency.

## 2024-10-14 ENCOUNTER — HOSPITAL ENCOUNTER (OUTPATIENT)
Age: 69
Discharge: HOME | End: 2024-10-14
Payer: MEDICARE

## 2024-10-14 LAB
ALBUMIN SERPL-MCNC: 4.3 G/DL (ref 3.5–5.2)
ALP SERPL-CCNC: 98 U/L (ref 40–129)
ALT SERPL-CCNC: 29 U/L (ref 0–40)
ANION GAP SERPL CALCULATED.3IONS-SCNC: 10 MMOL/L (ref 7–16)
AST SERPL-CCNC: 24 U/L (ref 0–39)
BILIRUB SERPL-MCNC: 0.7 MG/DL (ref 0–1.2)
BUN SERPL-MCNC: 13 MG/DL (ref 6–23)
CALCIUM SERPL-MCNC: 9 MG/DL (ref 8.6–10.2)
CHLORIDE SERPL-SCNC: 102 MMOL/L (ref 98–107)
CO2 SERPL-SCNC: 27 MMOL/L (ref 22–29)
CREAT SERPL-MCNC: 0.9 MG/DL (ref 0.7–1.2)
GFR, ESTIMATED: >90 ML/MIN/1.73M2
GLUCOSE SERPL-MCNC: 96 MG/DL (ref 74–99)
POTASSIUM SERPL-SCNC: 3.5 MMOL/L (ref 3.5–5)
PROT SERPL-MCNC: 7.2 G/DL (ref 6.4–8.3)
SEND OUT REPORT: NORMAL
SODIUM SERPL-SCNC: 139 MMOL/L (ref 132–146)
TEST NAME: NORMAL

## 2024-10-14 PROCEDURE — 86376 MICROSOMAL ANTIBODY EACH: CPT

## 2024-10-14 PROCEDURE — 86317 IMMUNOASSAY INFECTIOUS AGENT: CPT

## 2024-10-14 PROCEDURE — 87517 HEPATITIS B DNA QUANT: CPT

## 2024-10-14 PROCEDURE — 86038 ANTINUCLEAR ANTIBODIES: CPT

## 2024-10-14 PROCEDURE — 86704 HEP B CORE ANTIBODY TOTAL: CPT

## 2024-10-14 PROCEDURE — 83516 IMMUNOASSAY NONANTIBODY: CPT

## 2024-10-14 PROCEDURE — 80053 COMPREHEN METABOLIC PANEL: CPT

## 2024-10-14 PROCEDURE — 86039 ANTINUCLEAR ANTIBODIES (ANA): CPT

## 2024-10-14 PROCEDURE — 36415 COLL VENOUS BLD VENIPUNCTURE: CPT

## 2024-10-14 PROCEDURE — 87350 HEPATITIS BE AG IA: CPT

## 2024-10-15 LAB
ANA SER QL IA: NEGATIVE
HBV SURFACE AB SERPL IA-ACNC: 12.4 MIU/ML (ref 0–9.99)

## 2024-10-16 LAB
HBV CORE AB SER QL: NONREACTIVE
HBV E AG SERPL QL IA: NEGATIVE
MITOCHONDRIA M2 IGG SER-ACNC: 0.9 U/ML (ref 0–4)
THYROPEROXIDASE AB SERPL IA-ACNC: <4 IU/ML (ref 0–25)

## 2024-10-18 LAB
HBV IU/ML: NOT DETECTED IU/ML
HBV LOG 10 IU/ML: NOT DETECTED LOG IU/ML
INTERPRETATION: NOT DETECTED

## 2024-10-23 LAB
SEND OUT REPORT: NORMAL
TEST NAME: NORMAL

## 2025-02-11 ENCOUNTER — APPOINTMENT (OUTPATIENT)
Dept: GENERAL RADIOLOGY | Age: 70
End: 2025-02-11
Payer: MEDICARE

## 2025-02-11 ENCOUNTER — OFFICE VISIT (OUTPATIENT)
Dept: FAMILY MEDICINE CLINIC | Age: 70
End: 2025-02-11

## 2025-02-11 ENCOUNTER — HOSPITAL ENCOUNTER (INPATIENT)
Age: 70
LOS: 3 days | Discharge: HOME | End: 2025-02-14
Attending: EMERGENCY MEDICINE | Admitting: HOSPITALIST
Payer: MEDICARE

## 2025-02-11 ENCOUNTER — APPOINTMENT (OUTPATIENT)
Dept: CT IMAGING | Age: 70
End: 2025-02-11
Payer: MEDICARE

## 2025-02-11 VITALS
OXYGEN SATURATION: 94 % | WEIGHT: 244 LBS | DIASTOLIC BLOOD PRESSURE: 82 MMHG | HEART RATE: 105 BPM | TEMPERATURE: 97.8 F | BODY MASS INDEX: 38.3 KG/M2 | SYSTOLIC BLOOD PRESSURE: 110 MMHG | HEIGHT: 67 IN

## 2025-02-11 DIAGNOSIS — K56.609 SBO (SMALL BOWEL OBSTRUCTION) (HCC): Primary | ICD-10-CM

## 2025-02-11 DIAGNOSIS — K52.9 ACUTE GASTROENTERITIS: ICD-10-CM

## 2025-02-11 DIAGNOSIS — R52 BODY ACHES: Primary | ICD-10-CM

## 2025-02-11 PROBLEM — G89.29 OTHER CHRONIC PAIN: Status: ACTIVE | Noted: 2021-07-13

## 2025-02-11 PROBLEM — R73.03 PREDIABETES: Status: ACTIVE | Noted: 2022-10-24

## 2025-02-11 PROBLEM — K21.9 GERD (GASTROESOPHAGEAL REFLUX DISEASE): Status: ACTIVE | Noted: 2025-02-11

## 2025-02-11 PROBLEM — M17.10 PRIMARY LOCALIZED OSTEOARTHROSIS, LOWER LEG: Status: ACTIVE | Noted: 2018-03-07

## 2025-02-11 PROBLEM — Z85.828 HX OF BASAL CELL CARCINOMA: Status: ACTIVE | Noted: 2023-04-27

## 2025-02-11 PROBLEM — E66.812 CLASS 2 OBESITY DUE TO EXCESS CALORIES WITHOUT SERIOUS COMORBIDITY WITH BODY MASS INDEX (BMI) OF 37.0 TO 37.9 IN ADULT: Status: ACTIVE | Noted: 2025-02-11

## 2025-02-11 PROBLEM — R91.1 PULMONARY NODULE: Status: ACTIVE | Noted: 2020-06-25

## 2025-02-11 PROBLEM — I10 HTN (HYPERTENSION), MALIGNANT: Status: ACTIVE | Noted: 2018-06-21

## 2025-02-11 PROBLEM — E66.09 CLASS 2 OBESITY DUE TO EXCESS CALORIES WITHOUT SERIOUS COMORBIDITY WITH BODY MASS INDEX (BMI) OF 37.0 TO 37.9 IN ADULT: Status: ACTIVE | Noted: 2025-02-11

## 2025-02-11 PROBLEM — K80.20 CHOLELITHIASIS: Status: ACTIVE | Noted: 2025-02-11

## 2025-02-11 PROBLEM — I25.10 CORONARY ARTERY CALCIFICATION SEEN ON CAT SCAN: Status: ACTIVE | Noted: 2021-02-02

## 2025-02-11 LAB
ALBUMIN SERPL-MCNC: 3.9 G/DL (ref 3.5–5.2)
ALP SERPL-CCNC: 85 U/L (ref 40–129)
ALT SERPL-CCNC: 32 U/L (ref 0–40)
ANION GAP SERPL CALCULATED.3IONS-SCNC: 13 MMOL/L (ref 7–16)
AST SERPL-CCNC: 37 U/L (ref 0–39)
BASOPHILS # BLD: 0.02 K/UL (ref 0–0.2)
BASOPHILS NFR BLD: 0 % (ref 0–2)
BILIRUB SERPL-MCNC: 0.3 MG/DL (ref 0–1.2)
BILIRUB UR QL STRIP: ABNORMAL
BUN SERPL-MCNC: 17 MG/DL (ref 6–23)
CALCIUM SERPL-MCNC: 8.4 MG/DL (ref 8.6–10.2)
CASTS #/AREA URNS LPF: ABNORMAL /LPF
CHLORIDE SERPL-SCNC: 101 MMOL/L (ref 98–107)
CLARITY UR: ABNORMAL
CO2 SERPL-SCNC: 24 MMOL/L (ref 22–29)
COLOR UR: ABNORMAL
CREAT SERPL-MCNC: 1 MG/DL (ref 0.7–1.2)
EOSINOPHIL # BLD: 0.01 K/UL (ref 0.05–0.5)
EOSINOPHILS RELATIVE PERCENT: 0 % (ref 0–6)
EPI CELLS #/AREA URNS HPF: ABNORMAL /HPF
ERYTHROCYTE [DISTWIDTH] IN BLOOD BY AUTOMATED COUNT: 15.7 % (ref 11.5–15)
GFR, ESTIMATED: 79 ML/MIN/1.73M2
GLUCOSE SERPL-MCNC: 96 MG/DL (ref 74–99)
GLUCOSE UR STRIP-MCNC: NEGATIVE MG/DL
HCT VFR BLD AUTO: 53.8 % (ref 37–54)
HGB BLD-MCNC: 16.7 G/DL (ref 12.5–16.5)
HGB UR QL STRIP.AUTO: NEGATIVE
IMM GRANULOCYTES # BLD AUTO: 0.03 K/UL (ref 0–0.58)
IMM GRANULOCYTES NFR BLD: 0 % (ref 0–5)
INFLUENZA A ANTIGEN, POC: NORMAL
INFLUENZA A BY PCR: NOT DETECTED
INFLUENZA B ANTIGEN, POC: NORMAL
INFLUENZA B BY PCR: NOT DETECTED
KETONES UR STRIP-MCNC: NEGATIVE MG/DL
LACTATE BLDV-SCNC: 1.5 MMOL/L (ref 0.5–2.2)
LEUKOCYTE ESTERASE UR QL STRIP: NEGATIVE
LIPASE SERPL-CCNC: 11 U/L (ref 13–60)
LYMPHOCYTES NFR BLD: 1.14 K/UL (ref 1.5–4)
LYMPHOCYTES RELATIVE PERCENT: 11 % (ref 20–42)
Lab: NORMAL
MCH RBC QN AUTO: 28.3 PG (ref 26–35)
MCHC RBC AUTO-ENTMCNC: 31 G/DL (ref 32–34.5)
MCV RBC AUTO: 91 FL (ref 80–99.9)
MONOCYTES NFR BLD: 1.75 K/UL (ref 0.1–0.95)
MONOCYTES NFR BLD: 16 % (ref 2–12)
MUCOUS THREADS URNS QL MICRO: PRESENT
NEUTROPHILS NFR BLD: 73 % (ref 43–80)
NEUTS SEG NFR BLD: 7.87 K/UL (ref 1.8–7.3)
NITRITE UR QL STRIP: NEGATIVE
PERFORMING INSTRUMENT: NORMAL
PH UR STRIP: 6 [PH] (ref 5–8)
PLATELET # BLD AUTO: 301 K/UL (ref 130–450)
PMV BLD AUTO: 10.3 FL (ref 7–12)
POTASSIUM SERPL-SCNC: 3.9 MMOL/L (ref 3.5–5)
PROT SERPL-MCNC: 7.3 G/DL (ref 6.4–8.3)
PROT UR STRIP-MCNC: 100 MG/DL
QC PASS/FAIL: NORMAL
RBC # BLD AUTO: 5.91 M/UL (ref 3.8–5.8)
RBC # BLD: ABNORMAL 10*6/UL
RBC # BLD: ABNORMAL 10*6/UL
RBC #/AREA URNS HPF: ABNORMAL /HPF
SARS-COV-2 RDRP RESP QL NAA+PROBE: NOT DETECTED
SARS-COV-2, POC: NORMAL
SODIUM SERPL-SCNC: 138 MMOL/L (ref 132–146)
SP GR UR STRIP: >1.03 (ref 1–1.03)
SPECIMEN DESCRIPTION: NORMAL
UROBILINOGEN UR STRIP-ACNC: 0.2 EU/DL (ref 0–1)
WBC #/AREA URNS HPF: ABNORMAL /HPF
WBC OTHER # BLD: 10.8 K/UL (ref 4.5–11.5)

## 2025-02-11 PROCEDURE — 0DH67UZ INSERTION OF FEEDING DEVICE INTO STOMACH, VIA NATURAL OR ARTIFICIAL OPENING: ICD-10-PCS | Performed by: INTERNAL MEDICINE

## 2025-02-11 PROCEDURE — 99285 EMERGENCY DEPT VISIT HI MDM: CPT

## 2025-02-11 PROCEDURE — 87040 BLOOD CULTURE FOR BACTERIA: CPT

## 2025-02-11 PROCEDURE — 83690 ASSAY OF LIPASE: CPT

## 2025-02-11 PROCEDURE — 2580000003 HC RX 258: Performed by: EMERGENCY MEDICINE

## 2025-02-11 PROCEDURE — 6360000004 HC RX CONTRAST MEDICATION: Performed by: RADIOLOGY

## 2025-02-11 PROCEDURE — 2500000003 HC RX 250 WO HCPCS: Performed by: HOSPITALIST

## 2025-02-11 PROCEDURE — 6360000002 HC RX W HCPCS: Performed by: HOSPITALIST

## 2025-02-11 PROCEDURE — 87635 SARS-COV-2 COVID-19 AMP PRB: CPT

## 2025-02-11 PROCEDURE — 85025 COMPLETE CBC W/AUTO DIFF WBC: CPT

## 2025-02-11 PROCEDURE — 1200000000 HC SEMI PRIVATE

## 2025-02-11 PROCEDURE — 87502 INFLUENZA DNA AMP PROBE: CPT

## 2025-02-11 PROCEDURE — 87150 DNA/RNA AMPLIFIED PROBE: CPT

## 2025-02-11 PROCEDURE — 74018 RADEX ABDOMEN 1 VIEW: CPT

## 2025-02-11 PROCEDURE — 81001 URINALYSIS AUTO W/SCOPE: CPT

## 2025-02-11 PROCEDURE — 83605 ASSAY OF LACTIC ACID: CPT

## 2025-02-11 PROCEDURE — 2580000003 HC RX 258: Performed by: HOSPITALIST

## 2025-02-11 PROCEDURE — 80053 COMPREHEN METABOLIC PANEL: CPT

## 2025-02-11 PROCEDURE — 99223 1ST HOSP IP/OBS HIGH 75: CPT | Performed by: HOSPITALIST

## 2025-02-11 PROCEDURE — 74177 CT ABD & PELVIS W/CONTRAST: CPT

## 2025-02-11 RX ORDER — ACETAMINOPHEN 325 MG/1
650 TABLET ORAL EVERY 6 HOURS PRN
Status: DISCONTINUED | OUTPATIENT
Start: 2025-02-11 | End: 2025-02-14 | Stop reason: HOSPADM

## 2025-02-11 RX ORDER — 0.9 % SODIUM CHLORIDE 0.9 %
1000 INTRAVENOUS SOLUTION INTRAVENOUS ONCE
Status: COMPLETED | OUTPATIENT
Start: 2025-02-11 | End: 2025-02-11

## 2025-02-11 RX ORDER — ONDANSETRON 4 MG/1
4 TABLET, ORALLY DISINTEGRATING ORAL EVERY 8 HOURS PRN
Status: DISCONTINUED | OUTPATIENT
Start: 2025-02-11 | End: 2025-02-14 | Stop reason: HOSPADM

## 2025-02-11 RX ORDER — SODIUM CHLORIDE 0.9 % (FLUSH) 0.9 %
5-40 SYRINGE (ML) INJECTION PRN
Status: DISCONTINUED | OUTPATIENT
Start: 2025-02-11 | End: 2025-02-14 | Stop reason: HOSPADM

## 2025-02-11 RX ORDER — SODIUM CHLORIDE 9 MG/ML
INJECTION, SOLUTION INTRAVENOUS PRN
Status: DISCONTINUED | OUTPATIENT
Start: 2025-02-11 | End: 2025-02-14 | Stop reason: HOSPADM

## 2025-02-11 RX ORDER — SODIUM CHLORIDE, SODIUM LACTATE, POTASSIUM CHLORIDE, CALCIUM CHLORIDE 600; 310; 30; 20 MG/100ML; MG/100ML; MG/100ML; MG/100ML
INJECTION, SOLUTION INTRAVENOUS CONTINUOUS
Status: ACTIVE | OUTPATIENT
Start: 2025-02-11 | End: 2025-02-13

## 2025-02-11 RX ORDER — ONDANSETRON 2 MG/ML
4 INJECTION INTRAMUSCULAR; INTRAVENOUS EVERY 6 HOURS PRN
Status: DISCONTINUED | OUTPATIENT
Start: 2025-02-11 | End: 2025-02-14 | Stop reason: HOSPADM

## 2025-02-11 RX ORDER — ENOXAPARIN SODIUM 100 MG/ML
30 INJECTION SUBCUTANEOUS 2 TIMES DAILY
Status: DISCONTINUED | OUTPATIENT
Start: 2025-02-11 | End: 2025-02-14 | Stop reason: HOSPADM

## 2025-02-11 RX ORDER — ACETAMINOPHEN 650 MG/1
650 SUPPOSITORY RECTAL EVERY 6 HOURS PRN
Status: DISCONTINUED | OUTPATIENT
Start: 2025-02-11 | End: 2025-02-14 | Stop reason: HOSPADM

## 2025-02-11 RX ORDER — POTASSIUM CHLORIDE 7.45 MG/ML
10 INJECTION INTRAVENOUS PRN
Status: DISCONTINUED | OUTPATIENT
Start: 2025-02-11 | End: 2025-02-14 | Stop reason: HOSPADM

## 2025-02-11 RX ORDER — IOPAMIDOL 755 MG/ML
75 INJECTION, SOLUTION INTRAVASCULAR
Status: COMPLETED | OUTPATIENT
Start: 2025-02-11 | End: 2025-02-11

## 2025-02-11 RX ORDER — SODIUM CHLORIDE 0.9 % (FLUSH) 0.9 %
5-40 SYRINGE (ML) INJECTION EVERY 12 HOURS SCHEDULED
Status: DISCONTINUED | OUTPATIENT
Start: 2025-02-11 | End: 2025-02-14 | Stop reason: HOSPADM

## 2025-02-11 RX ORDER — MAGNESIUM SULFATE IN WATER 40 MG/ML
2000 INJECTION, SOLUTION INTRAVENOUS PRN
Status: DISCONTINUED | OUTPATIENT
Start: 2025-02-11 | End: 2025-02-14 | Stop reason: HOSPADM

## 2025-02-11 RX ADMIN — SODIUM CHLORIDE, PRESERVATIVE FREE 10 ML: 5 INJECTION INTRAVENOUS at 22:42

## 2025-02-11 RX ADMIN — SODIUM CHLORIDE 1000 ML: 9 INJECTION, SOLUTION INTRAVENOUS at 14:25

## 2025-02-11 RX ADMIN — SODIUM CHLORIDE, POTASSIUM CHLORIDE, SODIUM LACTATE AND CALCIUM CHLORIDE: 600; 310; 30; 20 INJECTION, SOLUTION INTRAVENOUS at 22:42

## 2025-02-11 RX ADMIN — IOPAMIDOL 75 ML: 755 INJECTION, SOLUTION INTRAVENOUS at 17:36

## 2025-02-11 RX ADMIN — ENOXAPARIN SODIUM 30 MG: 100 INJECTION SUBCUTANEOUS at 22:46

## 2025-02-11 ASSESSMENT — ENCOUNTER SYMPTOMS
EYE DISCHARGE: 0
SORE THROAT: 0
ABDOMINAL PAIN: 1
VOMITING: 1
NAUSEA: 1
EYE REDNESS: 0
SINUS PAIN: 0
BACK PAIN: 0
BLOOD IN STOOL: 0
PHOTOPHOBIA: 0
COUGH: 0
DIARRHEA: 1
TROUBLE SWALLOWING: 0
SHORTNESS OF BREATH: 0
EYE PAIN: 0
ALLERGIC/IMMUNOLOGIC NEGATIVE: 1
CHEST TIGHTNESS: 0

## 2025-02-11 ASSESSMENT — PAIN - FUNCTIONAL ASSESSMENT
PAIN_FUNCTIONAL_ASSESSMENT: NONE - DENIES PAIN
PAIN_FUNCTIONAL_ASSESSMENT: 0-10

## 2025-02-11 ASSESSMENT — PAIN DESCRIPTION - ORIENTATION: ORIENTATION: LOWER

## 2025-02-11 ASSESSMENT — PAIN DESCRIPTION - DESCRIPTORS: DESCRIPTORS: CRAMPING

## 2025-02-11 ASSESSMENT — PAIN DESCRIPTION - PAIN TYPE: TYPE: ACUTE PAIN

## 2025-02-11 ASSESSMENT — PAIN DESCRIPTION - LOCATION: LOCATION: ABDOMEN

## 2025-02-11 ASSESSMENT — PAIN DESCRIPTION - ONSET: ONSET: GRADUAL

## 2025-02-11 ASSESSMENT — PAIN DESCRIPTION - FREQUENCY: FREQUENCY: INTERMITTENT

## 2025-02-11 ASSESSMENT — PAIN SCALES - GENERAL: PAINLEVEL_OUTOF10: 6

## 2025-02-11 ASSESSMENT — VISUAL ACUITY: OU: 1

## 2025-02-11 NOTE — ED NOTES
Department of Emergency Medicine  FIRST PROVIDER TRIAGE NOTE             Independent MLP           2/11/25  12:05 PM EST    Date of Encounter: 2/11/25   MRN: [id number]      HPI: Bob Luther is a 69 y.o. male who presents to the ED for Abdominal Pain (lower abd pain, sent by dr zavala for possible bowel blockage. having diarrhea since sat.)     HAS NEVER HAD A BOWEL OBSTRUCTION IN THE PAST.     ROS: Negative for cp or sob.    PE: Gen Appearance/Constitutional: alert  HEENT: NC/NT. PERRLA,  Airway patent.    Provider-Patient relationship only established for Provider In Triage (PIT)  Full assessment, HPI and examination not performed, therefore, it is not yet possible to state whether or not an emergency medical condition exists   Focused assessment only during triage. This is not a comprehensive evaluation due to no physical ER space, staff shortage and high numbers of boarding patients in the ER.       Initial Plan of Care: All treatment areas with department are currently occupied. Plan to order/Initiate the following while awaiting opening in ED.  Initiate Treatment-Testing, Proceed toTreatment Area When Bed Available for ED Attending/MLP to Continue Care    Electronically signed by CLARY Munoz CNP   DD: 2/11/25      Stevan Cotto APRN - CNP  02/11/25 4241

## 2025-02-11 NOTE — ED PROVIDER NOTES
HPI:  2/11/25, Time: 1:42 PM SHABBIR Luther is a 69 y.o. male presenting to the ED for abdominal.  Distention patient was seen and [facility] with abdominal distention outpatient x-rays concern for bowel obstruction.  Patient was seen by Dr. Goyal and sent to the emergency department.  Patient has never had surgery of his abdomen however.  Onset was Saturday.  Started having bloating on Sunday his last bowel movement was Saturday.  He reports no diarrhea.  No urinary symptoms he has nausea but no vomiting.  He does report fever chills muscle aches and joint aches., beginning 3 days ago.  The complaint has been persistent, moderate in severity, and worsened by nothing.      Review of Systems:   A complete review of systems was performed and pertinent positives and negatives are stated within HPI, all other systems reviewed and are negative.    --------------------------------------------- PAST HISTORY ---------------------------------------------  Past Medical History:  has a past medical history of Arthritis, Cancer (HCC), Dysphagia, Hyperlipidemia, Hypertension, SHARYN on CPAP, and Osteoarthritis.    Past Surgical History:  has a past surgical history that includes joint replacement; shoulder surgery (Bilateral); Upper gastrointestinal endoscopy (06/2022); Tonsillectomy and adenoidectomy; and Total knee arthroplasty (Left, 7/21/2022).    Social History:  reports that he has never smoked. He has never used smokeless tobacco. He reports that he does not currently use alcohol. He reports that he does not use drugs.    Family History: family history is not on file.     The patient’s home medications have been reviewed.    Allergies: Aripiprazole, Atorvastatin, and Lexapro [escitalopram]    -------------------------------------------------- RESULTS -------------------------------------------------  All laboratory and radiology results have been personally reviewed by myself   LABS:  Results  to MG   Result Value Ref Range    Sodium 139 132 - 146 mmol/L    Potassium 4.4 3.5 - 5.0 mmol/L    Chloride 105 98 - 107 mmol/L    CO2 23 22 - 29 mmol/L    Anion Gap 11 7 - 16 mmol/L    Glucose 90 74 - 99 mg/dL    BUN 16 6 - 23 mg/dL    Creatinine 1.0 0.70 - 1.20 mg/dL    Est, Glom Filt Rate 79 >60 mL/min/1.73m2    Calcium 8.8 8.6 - 10.2 mg/dL    Total Protein 7.0 6.4 - 8.3 g/dL    Albumin 3.7 3.5 - 5.2 g/dL    Total Bilirubin 0.3 0.0 - 1.2 mg/dL    Alkaline Phosphatase 81 40 - 129 U/L    ALT 33 0 - 40 U/L    AST 37 0 - 39 U/L       RADIOLOGY:  Interpreted by Radiologist.  XR ABDOMEN FOR NG/OG/NE TUBE PLACEMENT   Final Result   Enteric tube tip in the fundus of the stomach.         CT ABDOMEN PELVIS W IV CONTRAST Additional Contrast? None   Final Result   1. Findings compatible with partial small bowel obstruction with a transition   point near the umbilicus in the mid to distal ileum.   2. Cholelithiasis without cholecystitis.   3. 7 mm noncalcified soft tissue pulmonary nodule in the right anterior   costophrenic angle within the middle lobe.  Follow-up noncontrast CT chest in   6-12 months recommended.   4. Mild thickening of the distal esophagus which could reflect esophagitis.   5. Fat containing left inguinal hernia.   6. Very small umbilical fat containing hernia.         CT ABDOMEN PELVIS W IV CONTRAST Additional Contrast? Oral    (Results Pending)       ------------------------- NURSING NOTES AND VITALS REVIEWED ---------------------------   The nursing notes within the ED encounter and vital signs as below have been reviewed.   BP (!) 147/87   Pulse 73   Temp 97.7 °F (36.5 °C) (Oral)   Resp 20   Ht 1.727 m (5' 8\")   Wt 113.4 kg (250 lb)   SpO2 94%   BMI 38.01 kg/m²   Oxygen Saturation Interpretation: Normal      ---------------------------------------------------PHYSICAL EXAM--------------------------------------      Constitutional/General: Alert and oriented x3, well appearing, non toxic in

## 2025-02-11 NOTE — ED NOTES
Name: Bob Luther  : 1955  MRN: [id number]    Date: 2025    Benefits of immediately proceeding with Radiology exam outweigh the risks and therefore the following is being waived:      [] Pregnancy test    [] Protocol for Iodine allergy    [] MRI questionnaire    [x] BUN/Creatinine        DO Alyse Lambert Joseph A, DO  25 [id number]

## 2025-02-11 NOTE — PROGRESS NOTES
pain, joint swelling and myalgias.   Skin: Negative.    Allergic/Immunologic: Negative.    Neurological:  Negative for dizziness, seizures, syncope, weakness, light-headedness, numbness and headaches.   Hematological:  Negative for adenopathy. Does not bruise/bleed easily.   Psychiatric/Behavioral: Negative.           Current Outpatient Medications:     albuterol sulfate HFA (PROVENTIL;VENTOLIN;PROAIR) 108 (90 Base) MCG/ACT inhaler, INHALE 1 PUFF INTO THE LUNGS EVERY 4 HOURS AS NEEDED FOR COUGH AND WHEEZE, Disp: , Rfl:     omeprazole (PRILOSEC) 40 MG delayed release capsule, TAKE 1 CAPSULE BY MOUTH EVERY DAY, Disp: , Rfl:     aspirin EC 81 MG EC tablet, Take 1 tablet by mouth in the morning and 1 tablet before bedtime. Do all this for 28 days. (Patient taking differently: Take 1 tablet by mouth daily), Disp: 56 tablet, Rfl: 0    Cholecalciferol (VITAMIN D) 50 MCG (2000 UT) CAPS capsule, Take 1 capsule by mouth daily, Disp: , Rfl:     Multiple Vitamins-Minerals (THERAPEUTIC MULTIVITAMIN-MINERALS) tablet, Take 1 tablet by mouth daily, Disp: , Rfl:     amLODIPine (NORVASC) 10 MG tablet, Take 1 tablet by mouth daily, Disp: , Rfl:     ibuprofen (ADVIL;MOTRIN) 600 MG tablet, Take 1 tablet by mouth every 8 hours as needed for Pain, Disp: 42 tablet, Rfl: 0    amoxicillin (AMOXIL) 500 MG capsule, TAKE 4 CAPSULES BY MOUTH ONE HOUR PRIOR TO PROCEDURE (Patient not taking: Reported on 3/26/2024), Disp: , Rfl:     venlafaxine (EFFEXOR XR) 150 MG extended release capsule, Take 1 capsule by mouth daily, Disp: , Rfl:   Allergies   Allergen Reactions    Aripiprazole      Other reaction(s): pacing, unsettled    Atorvastatin      Other reaction(s): fever blisters, ulcers, cramps    Lexapro [Escitalopram]      Other reaction(s): fatigue       Past Medical History:   Diagnosis Date    Arthritis     Cancer (HCC)     Dysphagia     recent dilation 6/2022    Hyperlipidemia     Hypertension     SHARYN on CPAP     Osteoarthritis      Past

## 2025-02-12 ENCOUNTER — APPOINTMENT (OUTPATIENT)
Dept: CT IMAGING | Age: 70
End: 2025-02-12
Payer: MEDICARE

## 2025-02-12 PROBLEM — E86.0 DEHYDRATION: Status: ACTIVE | Noted: 2025-02-12

## 2025-02-12 PROBLEM — I10 PRIMARY HYPERTENSION: Status: ACTIVE | Noted: 2025-02-12

## 2025-02-12 LAB
ABO + RH BLD: NORMAL
ALBUMIN SERPL-MCNC: 3.7 G/DL (ref 3.5–5.2)
ALP SERPL-CCNC: 81 U/L (ref 40–129)
ALT SERPL-CCNC: 33 U/L (ref 0–40)
ANION GAP SERPL CALCULATED.3IONS-SCNC: 11 MMOL/L (ref 7–16)
ARM BAND NUMBER: NORMAL
AST SERPL-CCNC: 37 U/L (ref 0–39)
BASOPHILS # BLD: 0.04 K/UL (ref 0–0.2)
BASOPHILS NFR BLD: 1 % (ref 0–2)
BILIRUB SERPL-MCNC: 0.3 MG/DL (ref 0–1.2)
BLOOD BANK SAMPLE EXPIRATION: NORMAL
BLOOD GROUP ANTIBODIES SERPL: NEGATIVE
BUN SERPL-MCNC: 16 MG/DL (ref 6–23)
CALCIUM SERPL-MCNC: 8.8 MG/DL (ref 8.6–10.2)
CHLORIDE SERPL-SCNC: 105 MMOL/L (ref 98–107)
CO2 SERPL-SCNC: 23 MMOL/L (ref 22–29)
CREAT SERPL-MCNC: 1 MG/DL (ref 0.7–1.2)
EOSINOPHIL # BLD: 0.04 K/UL (ref 0.05–0.5)
EOSINOPHILS RELATIVE PERCENT: 1 % (ref 0–6)
ERYTHROCYTE [DISTWIDTH] IN BLOOD BY AUTOMATED COUNT: 15.8 % (ref 11.5–15)
GFR, ESTIMATED: 79 ML/MIN/1.73M2
GLUCOSE SERPL-MCNC: 90 MG/DL (ref 74–99)
HCT VFR BLD AUTO: 46.9 % (ref 37–54)
HGB BLD-MCNC: 14.7 G/DL (ref 12.5–16.5)
IMM GRANULOCYTES # BLD AUTO: <0.03 K/UL (ref 0–0.58)
IMM GRANULOCYTES NFR BLD: 0 % (ref 0–5)
LYMPHOCYTES NFR BLD: 1.73 K/UL (ref 1.5–4)
LYMPHOCYTES RELATIVE PERCENT: 24 % (ref 20–42)
MCH RBC QN AUTO: 29.1 PG (ref 26–35)
MCHC RBC AUTO-ENTMCNC: 31.3 G/DL (ref 32–34.5)
MCV RBC AUTO: 92.9 FL (ref 80–99.9)
MONOCYTES NFR BLD: 1.28 K/UL (ref 0.1–0.95)
MONOCYTES NFR BLD: 18 % (ref 2–12)
NEUTROPHILS NFR BLD: 57 % (ref 43–80)
NEUTS SEG NFR BLD: 4.11 K/UL (ref 1.8–7.3)
PLATELET # BLD AUTO: 226 K/UL (ref 130–450)
PMV BLD AUTO: 10 FL (ref 7–12)
POTASSIUM SERPL-SCNC: 4.4 MMOL/L (ref 3.5–5)
PROT SERPL-MCNC: 7 G/DL (ref 6.4–8.3)
RBC # BLD AUTO: 5.05 M/UL (ref 3.8–5.8)
SODIUM SERPL-SCNC: 139 MMOL/L (ref 132–146)
WBC OTHER # BLD: 7.2 K/UL (ref 4.5–11.5)

## 2025-02-12 PROCEDURE — 86901 BLOOD TYPING SEROLOGIC RH(D): CPT

## 2025-02-12 PROCEDURE — 2500000003 HC RX 250 WO HCPCS: Performed by: HOSPITALIST

## 2025-02-12 PROCEDURE — 99222 1ST HOSP IP/OBS MODERATE 55: CPT | Performed by: STUDENT IN AN ORGANIZED HEALTH CARE EDUCATION/TRAINING PROGRAM

## 2025-02-12 PROCEDURE — 2580000003 HC RX 258: Performed by: HOSPITALIST

## 2025-02-12 PROCEDURE — 99232 SBSQ HOSP IP/OBS MODERATE 35: CPT | Performed by: INTERNAL MEDICINE

## 2025-02-12 PROCEDURE — 6360000002 HC RX W HCPCS: Performed by: HOSPITALIST

## 2025-02-12 PROCEDURE — 86900 BLOOD TYPING SEROLOGIC ABO: CPT

## 2025-02-12 PROCEDURE — 6360000004 HC RX CONTRAST MEDICATION: Performed by: PEDIATRICS

## 2025-02-12 PROCEDURE — 80053 COMPREHEN METABOLIC PANEL: CPT

## 2025-02-12 PROCEDURE — 85025 COMPLETE CBC W/AUTO DIFF WBC: CPT

## 2025-02-12 PROCEDURE — 74177 CT ABD & PELVIS W/CONTRAST: CPT

## 2025-02-12 PROCEDURE — 6370000000 HC RX 637 (ALT 250 FOR IP)

## 2025-02-12 PROCEDURE — 1200000000 HC SEMI PRIVATE

## 2025-02-12 PROCEDURE — 36415 COLL VENOUS BLD VENIPUNCTURE: CPT

## 2025-02-12 PROCEDURE — 86850 RBC ANTIBODY SCREEN: CPT

## 2025-02-12 RX ORDER — KETOROLAC TROMETHAMINE 15 MG/ML
15 INJECTION, SOLUTION INTRAMUSCULAR; INTRAVENOUS EVERY 6 HOURS PRN
Status: DISCONTINUED | OUTPATIENT
Start: 2025-02-12 | End: 2025-02-14 | Stop reason: HOSPADM

## 2025-02-12 RX ORDER — IOPAMIDOL 755 MG/ML
75 INJECTION, SOLUTION INTRAVASCULAR
Status: COMPLETED | OUTPATIENT
Start: 2025-02-12 | End: 2025-02-12

## 2025-02-12 RX ORDER — IOPAMIDOL 755 MG/ML
18 INJECTION, SOLUTION INTRAVASCULAR
Status: COMPLETED | OUTPATIENT
Start: 2025-02-12 | End: 2025-02-12

## 2025-02-12 RX ADMIN — ENOXAPARIN SODIUM 30 MG: 100 INJECTION SUBCUTANEOUS at 08:04

## 2025-02-12 RX ADMIN — IOPAMIDOL 75 ML: 755 INJECTION, SOLUTION INTRAVENOUS at 09:34

## 2025-02-12 RX ADMIN — SODIUM CHLORIDE, PRESERVATIVE FREE 10 ML: 5 INJECTION INTRAVENOUS at 08:05

## 2025-02-12 RX ADMIN — SODIUM CHLORIDE, PRESERVATIVE FREE 40 MG: 5 INJECTION INTRAVENOUS at 08:04

## 2025-02-12 RX ADMIN — IOPAMIDOL 18 ML: 755 INJECTION, SOLUTION INTRAVENOUS at 09:34

## 2025-02-12 RX ADMIN — ONDANSETRON 4 MG: 2 INJECTION, SOLUTION INTRAMUSCULAR; INTRAVENOUS at 08:05

## 2025-02-12 RX ADMIN — SODIUM CHLORIDE, POTASSIUM CHLORIDE, SODIUM LACTATE AND CALCIUM CHLORIDE: 600; 310; 30; 20 INJECTION, SOLUTION INTRAVENOUS at 08:22

## 2025-02-12 RX ADMIN — Medication 2 SPRAY: at 14:31

## 2025-02-12 ASSESSMENT — ENCOUNTER SYMPTOMS
CHEST TIGHTNESS: 0
COUGH: 0
DIARRHEA: 1
ABDOMINAL PAIN: 1
VOMITING: 0
BACK PAIN: 0
ABDOMINAL DISTENTION: 1
SHORTNESS OF BREATH: 0
NAUSEA: 1

## 2025-02-12 NOTE — CARE COORDINATION
Social work / Discharge planning:        General surgery is following for small bowel obstruction.     Currently NPO with IV fluids.    Plan is for diagnostic laparoscopy 2/13/25.    CT of abdomen pelvis.    Discharge needs undetermined at this time.    Electronically signed by SOFÍA Bermudez on 2/12/2025 at 1:29 PM

## 2025-02-12 NOTE — ED NOTES
ED to Inpatient Handoff Report    Notified Ramandeep that electronic handoff available and patient ready for transport to room 708.    Safety Risks: None identified    Patient in Restraints: no    Constant Observer or Patient : no    Telemetry Monitoring Ordered :No           Order to transfer to unit without monitor:N/A    Last MEWS:   Time completed: 2151     Deterioration Index Score:   Predictive Model Details          20 (Normal)  Factor Value    Calculated 2/11/2025 21:52 64% Age 69 years old    Deterioration Index Model 9% WBC count 10.8 k/uL     9% Systolic 140     8% Respiratory rate 15     6% Pulse oximetry 93 %     3% Pulse 90     2% Hematocrit 53.8 %     1% Sodium 138 mmol/L     0% Potassium 3.9 mmol/L     0% Temperature 98.3 °F (36.8 °C)        Vitals:    02/11/25 1205 02/11/25 1812 02/11/25 2151   BP: (!) 160/100 (!) 155/89 (!) 140/93   Pulse: (!) 112 90 90   Resp: 18 15 15   Temp: 98.1 °F (36.7 °C)  98.3 °F (36.8 °C)   TempSrc:   Oral   SpO2: 96% 97% 93%   Weight: 111.1 kg (245 lb)     Height: 1.727 m (5' 8\")           Opportunity for questions and clarification was provided.

## 2025-02-12 NOTE — ACP (ADVANCE CARE PLANNING)
Advance Care Planning   Healthcare Decision Maker:    Primary Decision Maker: Ann Luther - [facility] - [phone]    Click here to complete Healthcare Decision Makers including selection of the Healthcare Decision Maker Relationship (ie \"Primary\").

## 2025-02-12 NOTE — CONSULTS
GENERAL SURGERY  CONSULT NOTE  2/12/2025    Physician Consulted: Dr. Diamond  Reason for Consult: SBO    HPI  Bob Luther is a 69 y.o. male who presents to general surgery service for evaluation of abdominal distension and presented at the [facility] urgent care with xray showing concern for bowel obstruction. He was advised to come to the ED. Bloating started Saturday and he had a normal bowel movement then and has been just having diarrhea since. Not passing much flatus. States he has been nauseated without emesis. Some chills.     Past medical history of esophageal dilation in 2022, HTN. No previous abdominal surgeries. Previous colonoscopy around 5 years ago. No daily anticoagulation    Pt has been afebrile with some intermittent tachycardia. Labs show Hb 16.7, WBC 10.8, Lactic acid 1.5. Imaging shows dilated stomach and small bowel and what appears to be decompressed small bowel around the level of the umbilicus    Past Medical History:   Diagnosis Date    Arthritis     Cancer (HCC)     Dysphagia     recent dilation 6/2022    Hyperlipidemia     Hypertension     SHARYN on CPAP     Osteoarthritis        Past Surgical History:   Procedure Laterality Date    JOINT REPLACEMENT      R TKA    SHOULDER SURGERY Bilateral     shaved bone    TONSILLECTOMY AND ADENOIDECTOMY      TOTAL KNEE ARTHROPLASTY Left 7/21/2022    LEFT KNEE LIA ROBOTIC ASSISTED TOTAL ARTHROPLASTY performed by Jeff Carroll MD at [facility] OR    UPPER GASTROINTESTINAL ENDOSCOPY  06/2022    dilation       Medications Prior to Admission:    Prior to Admission medications    Medication Sig Start Date End Date Taking? Authorizing Provider   ibuprofen (ADVIL;MOTRIN) 600 MG tablet Take 1 tablet by mouth every 8 hours as needed for Pain 3/27/24 4/10/24  Blue Mosher MD   albuterol sulfate HFA (PROVENTIL;VENTOLIN;PROAIR) 108 (90 Base) MCG/ACT inhaler INHALE 1 PUFF INTO THE LUNGS EVERY 4 HOURS AS NEEDED FOR COUGH AND WHEEZE 8/24/22   Provider,

## 2025-02-12 NOTE — H&P
CPAP, GERD with esophagitis, peptic stricture?, glaucoma, OA with chronic pain & Dysthymia initially presented to [facility] for abdominal pain with nausea, diarrhea and headache. Outpatient KUB showed SBO. Pt sent to [facility] ED where CTAP Findings compatible with partial small bowel obstruction with a transition point near the umbilicus in the mid to distal ileum. Patient reports symptoms started two days ago. He believed he had a GI bug. He had crampy central abdominal pain with nausea and diarrhea. He has no history of abdominal surgery. He has never had anything like this before. He has had a colonoscopy before with no known history of polyps. He is not passing flatus        REVIEW OF SYSTEMS:  A comprehensive review of systems was negative except for: what is in the HPI      PMH:  Past Medical History:   Diagnosis Date    Arthritis     Cancer (HCC)     Dysphagia     recent dilation 6/2022    Hyperlipidemia     Hypertension     SHARYN on CPAP     Osteoarthritis        Surgical History:  Past Surgical History:   Procedure Laterality Date    JOINT REPLACEMENT      R TKA    SHOULDER SURGERY Bilateral     shaved bone    TONSILLECTOMY AND ADENOIDECTOMY      TOTAL KNEE ARTHROPLASTY Left 7/21/2022    LEFT KNEE LIA ROBOTIC ASSISTED TOTAL ARTHROPLASTY performed by Jeff Carroll MD at [facility] OR    UPPER GASTROINTESTINAL ENDOSCOPY  06/2022    dilation       Medications Prior to Admission:    Prior to Visit Medications    Medication Sig Taking? Authorizing Provider   ibuprofen (ADVIL;MOTRIN) 600 MG tablet Take 1 tablet by mouth every 8 hours as needed for Pain  Blue Mosher MD   albuterol sulfate HFA (PROVENTIL;VENTOLIN;PROAIR) 108 (90 Base) MCG/ACT inhaler INHALE 1 PUFF INTO THE LUNGS EVERY 4 HOURS AS NEEDED FOR COUGH AND WHEEZE  Provider, MD Neville   amoxicillin (AMOXIL) 500 MG capsule TAKE 4 CAPSULES BY MOUTH ONE HOUR PRIOR TO PROCEDURE  Patient not taking: Reported on 3/26/2024  Provider,

## 2025-02-12 NOTE — PROGRESS NOTES
4 Eyes Skin Assessment     NAME:  Bob Luther  YOB: 1955  MEDICAL RECORD NUMBER:  [id number]    The patient is being assessed for  Admission    I agree that at least one RN has performed a thorough Head to Toe Skin Assessment on the patient. ALL assessment sites listed below have been assessed.      Areas assessed by both nurses:    Head, Face, Ears, Shoulders, Back, Chest, Arms, Elbows, Hands, Sacrum. Buttock, Coccyx, Ischium, Legs. Feet and Heels, and Under Medical Devices         Does the Patient have a Wound? No noted wound(s)       Faustino Prevention initiated by RN: No  Wound Care Orders initiated by RN: No    Pressure Injury (Stage 3,4, Unstageable, DTI, NWPT, and Complex wounds) if present, place Wound referral order by RN under : No    New Ostomies, if present place, Ostomy referral order under : No     Nurse 1 eSignature: Electronically signed by Bianka Esquivel RN on 2/11/25 at 11:26 PM EST    **SHARE this note so that the co-signing nurse can place an eSignature**    Nurse 2 eSignature: Electronically signed by Ramandeep Stewart RN on 2/11/25 at 11:42 PM EST

## 2025-02-12 NOTE — PROGRESS NOTES
[facility] Hospitalist   Progress Note    Admitting Date and Time: 2/11/2025  1:31 PM  Admit Dx: SBO (small bowel obstruction) (HCC) [K56.609]    Subjective:    Patient was admitted with SBO (small bowel obstruction) (HCC) [K56.609]. Patient denies fever, chills, cp, sob, n/v.     pantoprazole (PROTONIX) 40 mg in sodium chloride (PF) 0.9 % 10 mL injection  40 mg IntraVENous Daily    sodium chloride flush  5-40 mL IntraVENous 2 times per day    enoxaparin  30 mg SubCUTAneous BID     ketorolac, 15 mg, Q6H PRN  HYDROmorphone, 0.5 mg, Q4H PRN  glycerin moisturizing mouth spray, 2 spray, PRN  sodium chloride flush, 5-40 mL, PRN  sodium chloride, , PRN  ondansetron, 4 mg, Q8H PRN   Or  ondansetron, 4 mg, Q6H PRN  acetaminophen, 650 mg, Q6H PRN   Or  acetaminophen, 650 mg, Q6H PRN  potassium chloride, 10 mEq, PRN  magnesium sulfate, 2,000 mg, PRN         Objective:    BP (!) 147/87   Pulse 73   Temp 97.7 °F (36.5 °C) (Oral)   Resp 20   Ht 1.727 m (5' 8\")   Wt 113.4 kg (250 lb)   SpO2 94%   BMI 38.01 kg/m²   Skin: warm and dry, no rash or erythema  Pulmonary/Chest: clear to auscultation bilaterally- no wheezes, rales or rhonchi, normal air movement, no respiratory distress  Cardiovascular: rhythm reg at rate of 76  Abdomen: soft, non-tender  Extremities: no cyanosis, no clubbing, and no edema      Recent Labs     02/11/25  1807 02/12/25  0811    139   K 3.9 4.4    105   CO2 24 23   BUN 17 16   CREATININE 1.0 1.0   GLUCOSE 96 90   CALCIUM 8.4* 8.8       Recent Labs     02/11/25  1408 02/12/25  0811   WBC 10.8 7.2   RBC 5.91* 5.05   HGB 16.7* 14.7   HCT 53.8 46.9   MCV 91.0 92.9   MCH 28.3 29.1   MCHC 31.0* 31.3*   RDW 15.7* 15.8*    226   MPV 10.3 10.0       CBC with Differential:    Lab Results   Component Value Date/Time    WBC 7.2 02/12/2025 08:11 AM    RBC 5.05 02/12/2025 08:11 AM    HGB 14.7 02/12/2025 08:11 AM    HCT 46.9 02/12/2025 08:11 AM     02/12/2025 08:11 AM

## 2025-02-12 NOTE — PROGRESS NOTES
[facility] Quality Flow/Interdisciplinary Rounds Progress Note        Quality Flow Rounds held on February 12, 2025    Disciplines Attending:  Bedside Nurse, , , Nursing Unit Leadership, and      Bob DOWNS Ashkan was admitted on 2/11/2025  1:31 PM    Anticipated Discharge Date:       Disposition:    Faustino Score:  Faustino Scale Score: 22    BS RISK OF UNPLANNED READMISSION 2.0             8.4 Total Score        Discussed patient goal for the day, patient clinical progression, and barriers to discharge.  The following Goal(s) of the Day/Commitment(s) have been identified:  Diagnostics - Report Results. RN will message surgical residents as soon STAT CT results.       Luciano Westbrook RN  February 12, 2025

## 2025-02-12 NOTE — PROGRESS NOTES
Reviewed results of CT with PO which dilation appears mildly improved however there is mesenteric edema and area on RUQ that appears to be where the obstruction could be as there is decompressed bowel in this area.    Went to examine patient and he states he does feel better. His NG fell out after the CT scan. He states he has been having liquid bowel movements but still is feeling bloated. Explained CT findings with patient and he understands that we will plan to do a diagnostic laparoscopy to evaluate the area of concern. He understands that if we have to resect bowel we will and if this means we have to open we may have to resort to that.     Will check on patient in pm.    No need to insert NG tube again unless pt has increased nausea or episodes of emesis.    Continue NPO with IVF    Plan for diagnostic laparoscopy, possible open 2/13 pending clinical progression.    Discussed with Dr. Diamond    Electronically signed by Nayely Lucas DO on 2/12/2025 at 10:36 AM

## 2025-02-13 ENCOUNTER — APPOINTMENT (OUTPATIENT)
Dept: GENERAL RADIOLOGY | Age: 70
End: 2025-02-13
Payer: MEDICARE

## 2025-02-13 LAB
ACB COMPLEX DNA BLD POS QL NAA+NON-PROBE: NOT DETECTED
ANION GAP SERPL CALCULATED.3IONS-SCNC: 14 MMOL/L (ref 7–16)
B FRAGILIS DNA BLD POS QL NAA+NON-PROBE: NOT DETECTED
BIOFIRE TEST COMMENT: ABNORMAL
BUN SERPL-MCNC: 11 MG/DL (ref 6–23)
C ALBICANS DNA BLD POS QL NAA+NON-PROBE: NOT DETECTED
C AURIS DNA BLD POS QL NAA+NON-PROBE: NOT DETECTED
C GATTII+NEOFOR DNA BLD POS QL NAA+N-PRB: NOT DETECTED
C GLABRATA DNA BLD POS QL NAA+NON-PROBE: NOT DETECTED
C KRUSEI DNA BLD POS QL NAA+NON-PROBE: NOT DETECTED
C PARAP DNA BLD POS QL NAA+NON-PROBE: NOT DETECTED
C TROPICLS DNA BLD POS QL NAA+NON-PROBE: NOT DETECTED
CALCIUM SERPL-MCNC: 8.6 MG/DL (ref 8.6–10.2)
CHLORIDE SERPL-SCNC: 103 MMOL/L (ref 98–107)
CO2 SERPL-SCNC: 23 MMOL/L (ref 22–29)
CREAT SERPL-MCNC: 0.8 MG/DL (ref 0.7–1.2)
E CLOAC COMP DNA BLD POS NAA+NON-PROBE: NOT DETECTED
E COLI DNA BLD POS QL NAA+NON-PROBE: NOT DETECTED
E FAECALIS DNA BLD POS QL NAA+NON-PROBE: NOT DETECTED
E FAECIUM DNA BLD POS QL NAA+NON-PROBE: NOT DETECTED
ENTEROBACTERALES DNA BLD POS NAA+N-PRB: NOT DETECTED
ERYTHROCYTE [DISTWIDTH] IN BLOOD BY AUTOMATED COUNT: 15.3 % (ref 11.5–15)
GFR, ESTIMATED: >90 ML/MIN/1.73M2
GLUCOSE SERPL-MCNC: 85 MG/DL (ref 74–99)
GP B STREP DNA BLD POS QL NAA+NON-PROBE: NOT DETECTED
HAEM INFLU DNA BLD POS QL NAA+NON-PROBE: NOT DETECTED
HCT VFR BLD AUTO: 44.5 % (ref 37–54)
HGB BLD-MCNC: 13.8 G/DL (ref 12.5–16.5)
K OXYTOCA DNA BLD POS QL NAA+NON-PROBE: NOT DETECTED
KLEBSIELLA SP DNA BLD POS QL NAA+NON-PRB: NOT DETECTED
KLEBSIELLA SP DNA BLD POS QL NAA+NON-PRB: NOT DETECTED
L MONOCYTOG DNA BLD POS QL NAA+NON-PROBE: NOT DETECTED
MCH RBC QN AUTO: 28.4 PG (ref 26–35)
MCHC RBC AUTO-ENTMCNC: 31 G/DL (ref 32–34.5)
MCV RBC AUTO: 91.6 FL (ref 80–99.9)
MECA+MECC ISLT/SPM QL: NOT DETECTED
MICROORGANISM SPEC CULT: ABNORMAL
MICROORGANISM/AGENT SPEC: ABNORMAL
N MEN DNA BLD POS QL NAA+NON-PROBE: NOT DETECTED
P AERUGINOSA DNA BLD POS NAA+NON-PROBE: NOT DETECTED
PLATELET # BLD AUTO: 206 K/UL (ref 130–450)
PMV BLD AUTO: 10 FL (ref 7–12)
POTASSIUM SERPL-SCNC: 3.7 MMOL/L (ref 3.5–5)
PROTEUS SP DNA BLD POS QL NAA+NON-PROBE: NOT DETECTED
RBC # BLD AUTO: 4.86 M/UL (ref 3.8–5.8)
S AUREUS DNA BLD POS QL NAA+NON-PROBE: NOT DETECTED
S AUREUS+CONS DNA BLD POS NAA+NON-PROBE: DETECTED
S EPIDERMIDIS DNA BLD POS QL NAA+NON-PRB: DETECTED
S LUGDUNENSIS DNA BLD POS QL NAA+NON-PRB: NOT DETECTED
S MALTOPHILIA DNA BLD POS QL NAA+NON-PRB: NOT DETECTED
S MARCESCENS DNA BLD POS NAA+NON-PROBE: NOT DETECTED
S PNEUM DNA BLD POS QL NAA+NON-PROBE: NOT DETECTED
S PYO DNA BLD POS QL NAA+NON-PROBE: NOT DETECTED
SALMONELLA DNA BLD POS QL NAA+NON-PROBE: NOT DETECTED
SERVICE CMNT-IMP: ABNORMAL
SODIUM SERPL-SCNC: 140 MMOL/L (ref 132–146)
SPECIMEN DESCRIPTION: ABNORMAL
STREPTOCOCCUS DNA BLD POS NAA+NON-PROBE: NOT DETECTED
WBC OTHER # BLD: 7.3 K/UL (ref 4.5–11.5)

## 2025-02-13 PROCEDURE — 80048 BASIC METABOLIC PNL TOTAL CA: CPT

## 2025-02-13 PROCEDURE — 99232 SBSQ HOSP IP/OBS MODERATE 35: CPT | Performed by: INTERNAL MEDICINE

## 2025-02-13 PROCEDURE — 99232 SBSQ HOSP IP/OBS MODERATE 35: CPT | Performed by: STUDENT IN AN ORGANIZED HEALTH CARE EDUCATION/TRAINING PROGRAM

## 2025-02-13 PROCEDURE — 2580000003 HC RX 258: Performed by: HOSPITALIST

## 2025-02-13 PROCEDURE — 2500000003 HC RX 250 WO HCPCS: Performed by: HOSPITALIST

## 2025-02-13 PROCEDURE — 85027 COMPLETE CBC AUTOMATED: CPT

## 2025-02-13 PROCEDURE — 1200000000 HC SEMI PRIVATE

## 2025-02-13 PROCEDURE — 6370000000 HC RX 637 (ALT 250 FOR IP): Performed by: INTERNAL MEDICINE

## 2025-02-13 PROCEDURE — 74018 RADEX ABDOMEN 1 VIEW: CPT

## 2025-02-13 PROCEDURE — 6360000002 HC RX W HCPCS: Performed by: HOSPITALIST

## 2025-02-13 PROCEDURE — 87081 CULTURE SCREEN ONLY: CPT

## 2025-02-13 RX ORDER — AMLODIPINE BESYLATE 10 MG/1
10 TABLET ORAL DAILY
Status: DISCONTINUED | OUTPATIENT
Start: 2025-02-13 | End: 2025-02-14 | Stop reason: HOSPADM

## 2025-02-13 RX ADMIN — SODIUM CHLORIDE, PRESERVATIVE FREE 40 MG: 5 INJECTION INTRAVENOUS at 09:57

## 2025-02-13 RX ADMIN — SODIUM CHLORIDE, POTASSIUM CHLORIDE, SODIUM LACTATE AND CALCIUM CHLORIDE: 600; 310; 30; 20 INJECTION, SOLUTION INTRAVENOUS at 17:17

## 2025-02-13 RX ADMIN — ENOXAPARIN SODIUM 30 MG: 100 INJECTION SUBCUTANEOUS at 20:45

## 2025-02-13 RX ADMIN — SODIUM CHLORIDE, PRESERVATIVE FREE 10 ML: 5 INJECTION INTRAVENOUS at 09:58

## 2025-02-13 RX ADMIN — AMLODIPINE BESYLATE 10 MG: 10 TABLET ORAL at 12:43

## 2025-02-13 RX ADMIN — ENOXAPARIN SODIUM 30 MG: 100 INJECTION SUBCUTANEOUS at 09:57

## 2025-02-13 NOTE — PROGRESS NOTES
GENERAL SURGERY  DAILY PROGRESS NOTE    Patient's Name/Date of Birth: Bob Luther / 1955    Date: 2025     Chief Complaint   Patient presents with    Abdominal Pain     lower abd pain, sent by dr zavala for possible bowel blockage. having diarrhea since sat.        Subjective:  Resting in bed. No bowel movements overnight.       Objective:  Last 24Hrs  Temp  Av °F (36.7 °C)  Min: 97.9 °F (36.6 °C)  Max: 98.1 °F (36.7 °C)  Resp  Av.5  Min: 18  Max: 20  Pulse  Av.5  Min: 68  Max: 82  Systolic (24hrs), Av , Min:134 , Max:153     Diastolic (24hrs), Av, Min:77, Max:87    SpO2  Av.3 %  Min: 95 %  Max: 99 %    I/O last 3 completed shifts:  In: 600 [I.V.:600]  Out: 900 [Urine:700; Emesis/NG output:200]      General: In no acute distress  Cardiovascular: Warm throughout, no edema  Respiratory: no respiratory distress, equal chest rise on RA  Abdomen: softly distended, nontender   Skin: no obvious rashes or lesions appreciated, no jaundice  Extremities: moving all extremities      CBC  Recent Labs     25  1408 25  0811   WBC 10.8 7.2   RBC 5.91* 5.05   HGB 16.7* 14.7   HCT 53.8 46.9   MCV 91.0 92.9   MCH 28.3 29.1   MCHC 31.0* 31.3*   RDW 15.7* 15.8*    226   MPV 10.3 10.0       CMP  Recent Labs     25  1807 25  0811    139   K 3.9 4.4    105   CO2 24 23   BUN 17 16   CREATININE 1.0 1.0   GLUCOSE 96 90   CALCIUM 8.4* 8.8   BILITOT 0.3 0.3   ALKPHOS 85 81   AST 37 37   ALT 32 33         Assessment/Plan:    Patient Active Problem List   Diagnosis    S/P total knee arthroplasty, left    Actinic keratosis    Benign lipomatous neoplasm, unspecified    Coronary artery calcification seen on CAT scan    Dysthymic disorder    Gastro-esophageal reflux disease without esophagitis    Glaucoma, right eye    HTN (hypertension), malignant    Hx of basal cell carcinoma    Hyperlipidemia, unspecified    Other chronic pain    Prediabetes    Primary

## 2025-02-13 NOTE — PATIENT CARE CONFERENCE
[facility] Quality Flow/Interdisciplinary Rounds Progress Note        Quality Flow Rounds held on February 13, 2025    Disciplines Attending:  Bedside Nurse, , , and Nursing Unit Leadership    Bob Luther was admitted on 2/11/2025  1:31 PM    Anticipated Discharge Date:       Disposition:    Faustino Score:  Faustino Scale Score: 21    [facility] RISK OF UNPLANNED READMISSION 2.0             6.8 Total Score        Discussed patient goal for the day, patient clinical progression, and barriers to discharge.  The following Goal(s) of the Day/Commitment(s) have been identified:  Diagnostics - Report Results and Labs - Report Results      Isa Mendoza RN  February 13, 2025

## 2025-02-13 NOTE — CARE COORDINATION
Case Management... Spoke to the patient at the bedside. Patient is from home with his wife. PCP is Dr. Gomez. Preferred pharmacy is [facility] in [place]. Patient states he uses a cpap at night. Patient states his discharge plan is home with no needs. General surgery following, plan to advance diet and discharge home. Patient currently on clears.   Electronically signed by Jodi Muñiz RN on 2/13/2025 at 1:46 PM

## 2025-02-13 NOTE — PLAN OF CARE
Problem: Discharge Planning  Goal: Discharge to home or other facility with appropriate resources  Outcome: Progressing  Flowsheets (Taken 2/12/2025 6279 by aCity Bassett, RN)  Discharge to home or other facility with appropriate resources:   Identify barriers to discharge with patient and caregiver   Arrange for needed discharge resources and transportation as appropriate   Identify discharge learning needs (meds, wound care, etc)   Arrange for interpreters to assist at discharge as needed   Refer to discharge planning if patient needs post-hospital services based on physician order or complex needs related to functional status, cognitive ability or social support system     Problem: Safety - Adult  Goal: Free from fall injury  Outcome: Progressing

## 2025-02-13 NOTE — PROGRESS NOTES
[facility] Hospitalist Progress Note    Admitting Date and Time: 2/11/2025  1:31 PM  Admit Dx: SBO (small bowel obstruction) (HCC) [K56.609]    Subjective:  Patient is being followed for SBO (small bowel obstruction) (HCC) [K56.609]   Pt seen and examined this morning   No acute events overnight   States he feels much better today  Pain improved     ROS: denies fever, chills, cp, sob, n/v, HA unless stated above.      pantoprazole (PROTONIX) 40 mg in sodium chloride (PF) 0.9 % 10 mL injection  40 mg IntraVENous Daily    sodium chloride flush  5-40 mL IntraVENous 2 times per day    enoxaparin  30 mg SubCUTAneous BID     ketorolac, 15 mg, Q6H PRN  HYDROmorphone, 0.5 mg, Q4H PRN  glycerin moisturizing mouth spray, 2 spray, PRN  sodium chloride flush, 5-40 mL, PRN  sodium chloride, , PRN  ondansetron, 4 mg, Q8H PRN   Or  ondansetron, 4 mg, Q6H PRN  acetaminophen, 650 mg, Q6H PRN   Or  acetaminophen, 650 mg, Q6H PRN  potassium chloride, 10 mEq, PRN  magnesium sulfate, 2,000 mg, PRN         Objective:    BP (!) 151/84   Pulse 63   Temp 97.7 °F (36.5 °C) (Oral)   Resp 18   Ht 1.727 m (5' 8\")   Wt 111.5 kg (245 lb 14.4 oz)   SpO2 96%   BMI 37.39 kg/m²     General Appearance: alert and oriented to person, place and time and in no acute distress  Skin: warm and dry  Head: normocephalic and atraumatic  Eyes: pupils equal, round, and reactive to light, extraocular eye movements intact, conjunctivae normal  Neck: neck supple and non tender without mass   Pulmonary/Chest: clear to auscultation bilaterally- no wheezes, rales or rhonchi, normal air movement, no respiratory distress  Cardiovascular: normal rate, normal S1 and S2 and no carotid bruits  Abdomen: soft, non-tender, non-distended, normal bowel sounds, no masses or organomegaly  Extremities: no cyanosis, no clubbing and no edema  Neurologic: no cranial nerve deficit and speech normal        Recent Labs     02/11/25  1807 02/12/25  0811 02/13/25  0851   NA

## 2025-02-13 NOTE — PROGRESS NOTES
This RN received th call from lab notifying of the 1 positive bottle out of the 4 blood cultures obtained. RN reported to the patients nurse. The RN will report to night NP once results show the organism

## 2025-02-14 VITALS
RESPIRATION RATE: 18 BRPM | WEIGHT: 245 LBS | DIASTOLIC BLOOD PRESSURE: 87 MMHG | SYSTOLIC BLOOD PRESSURE: 135 MMHG | HEART RATE: 73 BPM | BODY MASS INDEX: 37.13 KG/M2 | TEMPERATURE: 98.4 F | HEIGHT: 68 IN | OXYGEN SATURATION: 96 %

## 2025-02-14 LAB
ANION GAP SERPL CALCULATED.3IONS-SCNC: 13 MMOL/L (ref 7–16)
BUN SERPL-MCNC: 7 MG/DL (ref 6–23)
CALCIUM SERPL-MCNC: 9 MG/DL (ref 8.6–10.2)
CHLORIDE SERPL-SCNC: 101 MMOL/L (ref 98–107)
CO2 SERPL-SCNC: 26 MMOL/L (ref 22–29)
CREAT SERPL-MCNC: 0.8 MG/DL (ref 0.7–1.2)
ERYTHROCYTE [DISTWIDTH] IN BLOOD BY AUTOMATED COUNT: 14.9 % (ref 11.5–15)
GFR, ESTIMATED: >90 ML/MIN/1.73M2
GLUCOSE SERPL-MCNC: 94 MG/DL (ref 74–99)
HCT VFR BLD AUTO: 46.2 % (ref 37–54)
HGB BLD-MCNC: 14.6 G/DL (ref 12.5–16.5)
MCH RBC QN AUTO: 28.6 PG (ref 26–35)
MCHC RBC AUTO-ENTMCNC: 31.6 G/DL (ref 32–34.5)
MCV RBC AUTO: 90.6 FL (ref 80–99.9)
MICROORGANISM SPEC CULT: NORMAL
PLATELET # BLD AUTO: 249 K/UL (ref 130–450)
PMV BLD AUTO: 10.2 FL (ref 7–12)
POTASSIUM SERPL-SCNC: 3.4 MMOL/L (ref 3.5–5)
RBC # BLD AUTO: 5.1 M/UL (ref 3.8–5.8)
SODIUM SERPL-SCNC: 140 MMOL/L (ref 132–146)
SPECIMEN DESCRIPTION: NORMAL
WBC OTHER # BLD: 6.8 K/UL (ref 4.5–11.5)

## 2025-02-14 PROCEDURE — 6360000002 HC RX W HCPCS: Performed by: HOSPITALIST

## 2025-02-14 PROCEDURE — 80048 BASIC METABOLIC PNL TOTAL CA: CPT

## 2025-02-14 PROCEDURE — 2580000003 HC RX 258: Performed by: HOSPITALIST

## 2025-02-14 PROCEDURE — 6370000000 HC RX 637 (ALT 250 FOR IP): Performed by: INTERNAL MEDICINE

## 2025-02-14 PROCEDURE — 2500000003 HC RX 250 WO HCPCS: Performed by: HOSPITALIST

## 2025-02-14 PROCEDURE — 99231 SBSQ HOSP IP/OBS SF/LOW 25: CPT | Performed by: STUDENT IN AN ORGANIZED HEALTH CARE EDUCATION/TRAINING PROGRAM

## 2025-02-14 PROCEDURE — 99239 HOSP IP/OBS DSCHRG MGMT >30: CPT | Performed by: INTERNAL MEDICINE

## 2025-02-14 PROCEDURE — 85027 COMPLETE CBC AUTOMATED: CPT

## 2025-02-14 RX ORDER — POTASSIUM CHLORIDE 1500 MG/1
40 TABLET, EXTENDED RELEASE ORAL ONCE
Status: COMPLETED | OUTPATIENT
Start: 2025-02-14 | End: 2025-02-14

## 2025-02-14 RX ADMIN — SODIUM CHLORIDE, PRESERVATIVE FREE 40 MG: 5 INJECTION INTRAVENOUS at 08:22

## 2025-02-14 RX ADMIN — POTASSIUM CHLORIDE 40 MEQ: 1500 TABLET, EXTENDED RELEASE ORAL at 10:32

## 2025-02-14 RX ADMIN — SODIUM CHLORIDE, PRESERVATIVE FREE 10 ML: 5 INJECTION INTRAVENOUS at 08:23

## 2025-02-14 RX ADMIN — AMLODIPINE BESYLATE 10 MG: 10 TABLET ORAL at 08:22

## 2025-02-14 NOTE — PATIENT CARE CONFERENCE
P Quality Flow/Interdisciplinary Rounds Progress Note        Quality Flow Rounds held on February 14, 2025    Disciplines Attending:  Bedside Nurse, , , and Nursing Unit Leadership    Bob Luther was admitted on 2/11/2025  1:31 PM    Anticipated Discharge Date:       Disposition:    Faustino Score:  Faustino Scale Score: 21    [facility] RISK OF UNPLANNED READMISSION 2.0             7.2 Total Score        Discussed patient goal for the day, patient clinical progression, and barriers to discharge.  The following Goal(s) of the Day/Commitment(s) have been identified:  Discharge - Obtain Order      Isa Mendoza RN  February 14, 2025

## 2025-02-14 NOTE — DISCHARGE SUMMARY
achievable. COMPARISON: None. HISTORY: ORDERING SYSTEM PROVIDED HISTORY: abdominal pain concern for SBO TECHNOLOGIST PROVIDED HISTORY: Reason for exam:->abdominal pain concern for SBO Additional Contrast?->None Decision Support Exception - unselect if not a suspected or confirmed emergency medical condition->Emergency Medical Condition (MA) FINDINGS: Lower Chest: Linear bands of atelectasis in the lung bases.  Heart appears normal in size.  There is mild thickening of the distal esophagus which could reflect esophagitis.  There is a 7 mm noncalcified soft tissue pulmonary nodule in the right anterior costophrenic angle within the middle lobe. Organs: There is elongated right hepatic lobe.  No focal hepatic lesions. Spleen is normal.  There is cholelithiasis without cholecystitis.  Common bile duct is normal.  Normal appearance of the pancreas.  The adrenal glands are within normal limits.  Kidneys show normal enhancement without obstruction or calculus. GI/Bowel: Dilated small bowel loops are noted in the central abdomen with gas fluid levels.  There is a abrupt change in caliber of the mid ileum near the level of the umbilicus, distal ileum is decompressed.  Minimal fluid within the colon with gas fluid levels throughout.  No colonic inflammation or obstruction.  Findings suggest partial small bowel obstruction with a transition point near the umbilicus in the mid to distal ileum.  No evidence of pneumatosis.  No evidence of pneumoperitoneum. Pelvis: No free pelvic fluid.  Urinary bladder is contracted.  Prostate gland is mildly enlarged but otherwise unremarkable.  There is a fat containing left inguinal hernia.  Very small umbilical fat containing hernia. Peritoneum/Retroperitoneum: No retroperitoneal mass or adenopathy.  Aorta is nonaneurysmal. Bones/Soft Tissues:  No acute abnormality of the visualized osseous structures.  Moderate lower lumbar spine facet arthropathy.  Subcutaneous tissues appear generally

## 2025-02-14 NOTE — PLAN OF CARE
Problem: Discharge Planning  Goal: Discharge to home or other facility with appropriate resources  2/13/2025 2315 by Noemy Rehman  Outcome: Progressing  Flowsheets (Taken 2/13/2025 2253 by Cici Nixon)  Discharge to home or other facility with appropriate resources: Identify barriers to discharge with patient and caregiver  2/13/2025 1034 by Cynthia Alvarado, RN  Outcome: Progressing  Flowsheets (Taken 2/13/2025 1004)  Discharge to home or other facility with appropriate resources: Identify barriers to discharge with patient and caregiver     Problem: Safety - Adult  Goal: Free from fall injury  2/13/2025 2315 by Noemy Rehman  Outcome: Progressing  2/13/2025 1034 by Cynthia Alvarado, RN  Outcome: Progressing

## 2025-02-14 NOTE — PROGRESS NOTES
GENERAL SURGERY  DAILY PROGRESS NOTE    Patient's Name/Date of Birth: Bob Luther / 1955    Date: 2025     Chief Complaint   Patient presents with    Abdominal Pain     lower abd pain, sent by dr zavala for possible bowel blockage. having diarrhea since sat.        Subjective:  Multiple BMs, tolerating CLD ready for DC      Objective:  Last 24Hrs  Temp  Av.5 °F (36.9 °C)  Min: 98.4 °F (36.9 °C)  Max: 98.6 °F (37 °C)  Resp  Av  Min: 18  Max: 18  Pulse  Av.5  Min: 73  Max: 80  Systolic (24hrs), Av , Min:135 , Max:136     Diastolic (24hrs), Av, Min:83, Max:87    SpO2  Av %  Min: 96 %  Max: 96 %    I/O last 3 completed shifts:  In: 840 [P.O.:240; I.V.:600]  Out: 400 [Urine:400]      General: In no acute distress  Cardiovascular: Warm throughout, no edema  Respiratory: no respiratory distress, equal chest rise on RA  Abdomen: softly distended, nontender   Skin: no obvious rashes or lesions appreciated, no jaundice  Extremities: moving all extremities      CBC  Recent Labs     25  0811 25  0851 25  0732   WBC 7.2 7.3 6.8   RBC 5.05 4.86 5.10   HGB 14.7 13.8 14.6   HCT 46.9 44.5 46.2   MCV 92.9 91.6 90.6   MCH 29.1 28.4 28.6   MCHC 31.3* 31.0* 31.6*   RDW 15.8* 15.3* 14.9    206 249   MPV 10.0 10.0 10.2       CMP  Recent Labs     25  1807 25  0811 25  0851 25  0732    139 140 140   K 3.9 4.4 3.7 3.4*    105 103 101   CO2 24 23 23 26   BUN 17 16 11 7   CREATININE 1.0 1.0 0.8 0.8   GLUCOSE 96 90 85 94   CALCIUM 8.4* 8.8 8.6 9.0   BILITOT 0.3 0.3  --   --    ALKPHOS 85 81  --   --    AST 37 37  --   --    ALT 32 33  --   --          Assessment/Plan:    Patient Active Problem List   Diagnosis    S/P total knee arthroplasty, left    Actinic keratosis    Benign lipomatous neoplasm, unspecified    Coronary artery calcification seen on CAT scan    Dysthymic disorder    Gastro-esophageal reflux disease without esophagitis

## 2025-02-16 LAB
MICROORGANISM SPEC CULT: NORMAL
SERVICE CMNT-IMP: NORMAL
SPECIMEN DESCRIPTION: NORMAL

## 2025-02-23 SDOH — HEALTH STABILITY: PHYSICAL HEALTH: ON AVERAGE, HOW MANY DAYS PER WEEK DO YOU ENGAGE IN MODERATE TO STRENUOUS EXERCISE (LIKE A BRISK WALK)?: 0 DAYS

## 2025-02-26 ENCOUNTER — OFFICE VISIT (OUTPATIENT)
Dept: FAMILY MEDICINE CLINIC | Age: 70
End: 2025-02-26
Payer: MEDICARE

## 2025-02-26 VITALS
SYSTOLIC BLOOD PRESSURE: 114 MMHG | OXYGEN SATURATION: 95 % | HEART RATE: 74 BPM | BODY MASS INDEX: 38.8 KG/M2 | HEIGHT: 68 IN | WEIGHT: 256 LBS | DIASTOLIC BLOOD PRESSURE: 72 MMHG | TEMPERATURE: 97.5 F

## 2025-02-26 DIAGNOSIS — I25.10 CORONARY ARTERY CALCIFICATION SEEN ON CAT SCAN: ICD-10-CM

## 2025-02-26 DIAGNOSIS — R73.03 PREDIABETES: ICD-10-CM

## 2025-02-26 DIAGNOSIS — K21.9 GASTROESOPHAGEAL REFLUX DISEASE WITHOUT ESOPHAGITIS: Primary | ICD-10-CM

## 2025-02-26 DIAGNOSIS — R91.1 SOLITARY PULMONARY NODULE: ICD-10-CM

## 2025-02-26 DIAGNOSIS — J45.20 MILD INTERMITTENT ASTHMA WITHOUT COMPLICATION: ICD-10-CM

## 2025-02-26 DIAGNOSIS — I10 ESSENTIAL HYPERTENSION: ICD-10-CM

## 2025-02-26 PROBLEM — K56.609 SBO (SMALL BOWEL OBSTRUCTION) (HCC): Chronic | Status: RESOLVED | Noted: 2025-02-11 | Resolved: 2025-02-26

## 2025-02-26 PROBLEM — G89.29 OTHER CHRONIC PAIN: Status: RESOLVED | Noted: 2021-07-13 | Resolved: 2025-02-26

## 2025-02-26 PROBLEM — E86.0 DEHYDRATION: Status: RESOLVED | Noted: 2025-02-12 | Resolved: 2025-02-26

## 2025-02-26 PROCEDURE — 1123F ACP DISCUSS/DSCN MKR DOCD: CPT | Performed by: STUDENT IN AN ORGANIZED HEALTH CARE EDUCATION/TRAINING PROGRAM

## 2025-02-26 PROCEDURE — G2211 COMPLEX E/M VISIT ADD ON: HCPCS | Performed by: STUDENT IN AN ORGANIZED HEALTH CARE EDUCATION/TRAINING PROGRAM

## 2025-02-26 PROCEDURE — 3074F SYST BP LT 130 MM HG: CPT | Performed by: STUDENT IN AN ORGANIZED HEALTH CARE EDUCATION/TRAINING PROGRAM

## 2025-02-26 PROCEDURE — 3078F DIAST BP <80 MM HG: CPT | Performed by: STUDENT IN AN ORGANIZED HEALTH CARE EDUCATION/TRAINING PROGRAM

## 2025-02-26 PROCEDURE — 1159F MED LIST DOCD IN RCRD: CPT | Performed by: STUDENT IN AN ORGANIZED HEALTH CARE EDUCATION/TRAINING PROGRAM

## 2025-02-26 PROCEDURE — 99214 OFFICE O/P EST MOD 30 MIN: CPT | Performed by: STUDENT IN AN ORGANIZED HEALTH CARE EDUCATION/TRAINING PROGRAM

## 2025-02-26 RX ORDER — FAMOTIDINE 40 MG/1
40 TABLET, FILM COATED ORAL 2 TIMES DAILY
Qty: 30 TABLET | Refills: 0 | Status: CANCELLED
Start: 2025-02-26

## 2025-02-26 RX ORDER — PANTOPRAZOLE SODIUM 40 MG/1
40 TABLET, DELAYED RELEASE ORAL DAILY
Qty: 30 TABLET | Refills: 0
Start: 2025-02-26

## 2025-02-26 RX ORDER — ALBUTEROL SULFATE 90 UG/1
2 INHALANT RESPIRATORY (INHALATION) EVERY 6 HOURS PRN
Qty: 18 G | Refills: 0
Start: 2025-02-26

## 2025-02-26 RX ORDER — PRAVASTATIN SODIUM 10 MG
10 TABLET ORAL
COMMUNITY

## 2025-02-26 RX ORDER — AMLODIPINE BESYLATE 10 MG/1
10 TABLET ORAL DAILY
Qty: 30 TABLET | Refills: 0
Start: 2025-02-26

## 2025-02-26 RX ORDER — ROSUVASTATIN CALCIUM 10 MG/1
10 TABLET, COATED ORAL DAILY
Qty: 90 TABLET | Refills: 1 | Status: SHIPPED | OUTPATIENT
Start: 2025-02-26

## 2025-02-26 RX ORDER — PANTOPRAZOLE SODIUM 40 MG/1
TABLET, DELAYED RELEASE ORAL
COMMUNITY
Start: 2025-02-13 | End: 2025-02-26 | Stop reason: SDUPTHER

## 2025-02-26 RX ORDER — ASPIRIN 81 MG/1
81 TABLET ORAL DAILY
Qty: 90 TABLET | Refills: 1
Start: 2025-02-26

## 2025-02-26 RX ORDER — PRAVASTATIN SODIUM 10 MG
10 TABLET ORAL
Qty: 30 TABLET | Refills: 0 | Status: CANCELLED
Start: 2025-02-26

## 2025-02-26 RX ORDER — FAMOTIDINE 40 MG/1
TABLET, FILM COATED ORAL
COMMUNITY
Start: 2025-02-13 | End: 2025-02-26

## 2025-02-26 ASSESSMENT — PATIENT HEALTH QUESTIONNAIRE - PHQ9
1. LITTLE INTEREST OR PLEASURE IN DOING THINGS: NOT AT ALL
6. FEELING BAD ABOUT YOURSELF - OR THAT YOU ARE A FAILURE OR HAVE LET YOURSELF OR YOUR FAMILY DOWN: NOT AT ALL
SUM OF ALL RESPONSES TO PHQ9 QUESTIONS 1 & 2: 0
8. MOVING OR SPEAKING SO SLOWLY THAT OTHER PEOPLE COULD HAVE NOTICED. OR THE OPPOSITE, BEING SO FIGETY OR RESTLESS THAT YOU HAVE BEEN MOVING AROUND A LOT MORE THAN USUAL: NOT AT ALL
7. TROUBLE CONCENTRATING ON THINGS, SUCH AS READING THE NEWSPAPER OR WATCHING TELEVISION: NOT AT ALL
10. IF YOU CHECKED OFF ANY PROBLEMS, HOW DIFFICULT HAVE THESE PROBLEMS MADE IT FOR YOU TO DO YOUR WORK, TAKE CARE OF THINGS AT HOME, OR GET ALONG WITH OTHER PEOPLE: NOT DIFFICULT AT ALL
9. THOUGHTS THAT YOU WOULD BE BETTER OFF DEAD, OR OF HURTING YOURSELF: NOT AT ALL
4. FEELING TIRED OR HAVING LITTLE ENERGY: NOT AT ALL
5. POOR APPETITE OR OVEREATING: NOT AT ALL
SUM OF ALL RESPONSES TO PHQ QUESTIONS 1-9: 0
SUM OF ALL RESPONSES TO PHQ QUESTIONS 1-9: 0
3. TROUBLE FALLING OR STAYING ASLEEP: NOT AT ALL
SUM OF ALL RESPONSES TO PHQ QUESTIONS 1-9: 0
SUM OF ALL RESPONSES TO PHQ QUESTIONS 1-9: 0
2. FEELING DOWN, DEPRESSED OR HOPELESS: NOT AT ALL

## 2025-02-26 NOTE — PROGRESS NOTES
[facility] C[facility]  [address] [place] [id number]  Dept: [phone]  Dept Fax: [phone]   DATE OF VISIT : 2025      Patient:  Bob Luther  Age: 69 y.o.       : 1955      Chief complaint:   Chief Complaint   Patient presents with    New Patient         History of Present Illness     History of Present Illness  The patient is a 69-year-old male who presents today to [facility].    He was recently hospitalized for a partial small bowel obstruction, diagnosed by Dr. Goyal, and continues to experience bloating and soft stools. He reports no pain but describes the bloating as uncomfortable. He has been managing these symptoms with Metamucil.    He has discontinued his pravastatin medication and is currently taking Protonix for GERD. Despite this, he has been informed that his GERD is poorly managed. He recalls experiencing severe throat discomfort at night due to GERD, but this symptom has since subsided.    He uses an inhaler as needed, particularly during episodes of wheezing, which have become more bothersome. He has never been diagnosed with asthma.    He has a long-standing lung nodule, which is monitored annually, with the last imaging done in 2024. He does not see a pulmonologist and has never smoked.    He has a history of arthritis and has undergone bilateral knee replacements, with the right knee replaced in the late  and the left knee replaced approximately 5 years ago.    He has a history of basal cell carcinoma, with the most recent lesion excised yesterday. These lesions are predominantly located on his head.    He has glaucoma, which is being monitored by an ophthalmologist, but is not currently on any treatment.    He has no known thyroid issues. He has calcifications in his heart, as revealed by a recent CT scan. He had blood work done during his recent hospitalization. He occasionally experiences

## 2025-03-12 ENCOUNTER — PATIENT MESSAGE (OUTPATIENT)
Dept: FAMILY MEDICINE CLINIC | Age: 70
End: 2025-03-12

## 2025-03-17 NOTE — TELEPHONE ENCOUNTER
3rd attempt to call the patient to schedule an appointment. Left another message on machine to call back.

## 2025-03-18 ENCOUNTER — OFFICE VISIT (OUTPATIENT)
Dept: PRIMARY CARE CLINIC | Age: 70
End: 2025-03-18
Payer: MEDICARE

## 2025-03-18 VITALS
BODY MASS INDEX: 39.71 KG/M2 | HEART RATE: 79 BPM | OXYGEN SATURATION: 95 % | HEIGHT: 68 IN | SYSTOLIC BLOOD PRESSURE: 138 MMHG | RESPIRATION RATE: 18 BRPM | WEIGHT: 262 LBS | DIASTOLIC BLOOD PRESSURE: 70 MMHG | TEMPERATURE: 98.4 F

## 2025-03-18 DIAGNOSIS — Z12.11 COLON CANCER SCREENING: ICD-10-CM

## 2025-03-18 DIAGNOSIS — R73.9 HYPERGLYCEMIA: ICD-10-CM

## 2025-03-18 DIAGNOSIS — R14.0 ABDOMINAL BLOATING: Primary | ICD-10-CM

## 2025-03-18 DIAGNOSIS — Z13.220 NEED FOR LIPID SCREENING: ICD-10-CM

## 2025-03-18 DIAGNOSIS — Z87.19 HX OF SMALL BOWEL OBSTRUCTION: ICD-10-CM

## 2025-03-18 LAB
CHOLESTEROL, TOTAL: 137 MG/DL
HDLC SERPL-MCNC: 37 MG/DL
LDL CHOLESTEROL: 60 MG/DL
TRIGL SERPL-MCNC: 198 MG/DL
VLDLC SERPL CALC-MCNC: 40 MG/DL

## 2025-03-18 PROCEDURE — 99214 OFFICE O/P EST MOD 30 MIN: CPT | Performed by: STUDENT IN AN ORGANIZED HEALTH CARE EDUCATION/TRAINING PROGRAM

## 2025-03-18 PROCEDURE — 3075F SYST BP GE 130 - 139MM HG: CPT | Performed by: STUDENT IN AN ORGANIZED HEALTH CARE EDUCATION/TRAINING PROGRAM

## 2025-03-18 PROCEDURE — 3078F DIAST BP <80 MM HG: CPT | Performed by: STUDENT IN AN ORGANIZED HEALTH CARE EDUCATION/TRAINING PROGRAM

## 2025-03-18 PROCEDURE — 1159F MED LIST DOCD IN RCRD: CPT | Performed by: STUDENT IN AN ORGANIZED HEALTH CARE EDUCATION/TRAINING PROGRAM

## 2025-03-18 PROCEDURE — 1123F ACP DISCUSS/DSCN MKR DOCD: CPT | Performed by: STUDENT IN AN ORGANIZED HEALTH CARE EDUCATION/TRAINING PROGRAM

## 2025-03-18 RX ORDER — DICYCLOMINE HYDROCHLORIDE 10 MG/1
10 CAPSULE ORAL
Qty: 120 CAPSULE | Refills: 0 | Status: SHIPPED | OUTPATIENT
Start: 2025-03-18

## 2025-03-18 RX ORDER — SIMETHICONE 80 MG
80 TABLET,CHEWABLE ORAL 4 TIMES DAILY PRN
Qty: 180 TABLET | Refills: 3 | Status: CANCELLED | OUTPATIENT
Start: 2025-03-18

## 2025-03-18 NOTE — PROGRESS NOTES
[facility] PRIMARY [facility]ARE  [address] [address]  Dept: [phone]  Dept Fax: [phone]   DATE OF VISIT : 3/18/2025      Patient:  Bob Luther  Age: 69 y.o.       : 1955      Chief complaint:   Chief Complaint   Patient presents with    Bloated     Pt states he is still having the same problems as when he was in the hospital 6 weeks ago          History of Present Illness       History of Present Illness  The patient is a 69-year-old male who presents today for bloating.    He reports persistent abdominal discomfort characterized by bloating and nausea, which has been ongoing since his hospitalization for a small bowel obstruction in 2025. Despite experiencing hunger, he refrains from eating due to the subsequent exacerbation of his symptoms. He has attempted to manage his symptoms with stool softeners and laxatives, but these interventions have only provided intermittent relief. His bowel movements are infrequent, occurring once every 5 days without the aid of medication. He does not experience pain during defecation but reports a lack of urge to defecate. Previous attempts to alleviate his symptoms with Gas-X have been unsuccessful. He is currently on pantoprazole for GERD and another medication, the name of which he can not recall, to be taken before meals. However, these medications have not improved his condition. He expresses a desire for a colonoscopy to further investigate his symptoms. He has never been diagnosed with diabetes, and his cholesterol levels were last checked in 2025. He reports no vomiting but continues to experience nausea. He also reports no alcohol consumption.    SOCIAL HISTORY  He does not drink alcohol.    MEDICATIONS  Current: pantoprazole      Medication List:    Current Outpatient Medications   Medication Sig Dispense Refill    dicyclomine (BENTYL) 10 MG capsule Take 1 capsule by mouth 4 times daily (before meals

## 2025-03-19 ENCOUNTER — RESULTS FOLLOW-UP (OUTPATIENT)
Dept: PRIMARY CARE CLINIC | Age: 70
End: 2025-03-19

## 2025-03-19 LAB — HBA1C MFR BLD: 5.6 % (ref 4–5.6)

## 2025-03-21 ENCOUNTER — OFFICE VISIT (OUTPATIENT)
Dept: SURGERY | Age: 70
End: 2025-03-21

## 2025-03-21 VITALS
BODY MASS INDEX: 39.71 KG/M2 | RESPIRATION RATE: 18 BRPM | WEIGHT: 262 LBS | DIASTOLIC BLOOD PRESSURE: 76 MMHG | OXYGEN SATURATION: 97 % | HEART RATE: 64 BPM | HEIGHT: 68 IN | SYSTOLIC BLOOD PRESSURE: 148 MMHG | TEMPERATURE: 97.8 F

## 2025-03-21 DIAGNOSIS — R14.0 ABDOMINAL BLOATING: Primary | ICD-10-CM

## 2025-03-21 DIAGNOSIS — K21.9 GASTROESOPHAGEAL REFLUX DISEASE WITHOUT ESOPHAGITIS: ICD-10-CM

## 2025-03-21 DIAGNOSIS — K59.00 CONSTIPATION, UNSPECIFIED CONSTIPATION TYPE: ICD-10-CM

## 2025-03-21 RX ORDER — SODIUM CHLORIDE 0.9 % (FLUSH) 0.9 %
5-40 SYRINGE (ML) INJECTION PRN
OUTPATIENT
Start: 2025-03-21

## 2025-03-21 RX ORDER — SODIUM CHLORIDE 9 MG/ML
25 INJECTION, SOLUTION INTRAVENOUS PRN
OUTPATIENT
Start: 2025-03-21

## 2025-03-21 RX ORDER — FAMOTIDINE 40 MG/1
40 TABLET, FILM COATED ORAL
COMMUNITY
Start: 2025-03-13 | End: 2025-03-21 | Stop reason: ALTCHOICE

## 2025-03-21 RX ORDER — SODIUM CHLORIDE, SODIUM LACTATE, POTASSIUM CHLORIDE, CALCIUM CHLORIDE 600; 310; 30; 20 MG/100ML; MG/100ML; MG/100ML; MG/100ML
INJECTION, SOLUTION INTRAVENOUS CONTINUOUS
OUTPATIENT
Start: 2025-03-21

## 2025-03-21 RX ORDER — PANTOPRAZOLE SODIUM 40 MG/1
40 TABLET, DELAYED RELEASE ORAL DAILY
Qty: 30 TABLET | Refills: 3 | Status: SHIPPED | OUTPATIENT
Start: 2025-03-21

## 2025-03-21 RX ORDER — POLYETHYLENE GLYCOL 3350 17 G/17G
17 POWDER, FOR SOLUTION ORAL DAILY
Qty: 1530 G | Refills: 1 | Status: SHIPPED | OUTPATIENT
Start: 2025-03-21 | End: 2025-09-17

## 2025-03-21 RX ORDER — SODIUM CHLORIDE 0.9 % (FLUSH) 0.9 %
5-40 SYRINGE (ML) INJECTION EVERY 12 HOURS SCHEDULED
OUTPATIENT
Start: 2025-03-21

## 2025-03-21 ASSESSMENT — ENCOUNTER SYMPTOMS
ABDOMINAL DISTENTION: 1
VOMITING: 0
SHORTNESS OF BREATH: 0
COLOR CHANGE: 0
DIARRHEA: 0
CONSTIPATION: 1
WHEEZING: 0
COUGH: 0
STRIDOR: 0
TROUBLE SWALLOWING: 0
CHOKING: 0
ABDOMINAL PAIN: 1
APNEA: 0
BLOOD IN STOOL: 0
NAUSEA: 1
CHEST TIGHTNESS: 0
ANAL BLEEDING: 0

## 2025-03-21 NOTE — PATIENT INSTRUCTIONS
assess the lining. A tissue sample or polyps may be removed during the procedure.     How Long Will It Take?   Usually it takes about 30 to 45 minutes     Will It Hurt?   Most people do not feel anything during the procedure and will not remember the procedure.      After the procedure, gas pains and cramping are common. These pains should go away with the passing of gas.     Post-procedure Care   If any tissue was removed:   It will be sent to a lab to be examined. It may take 1-2 weeks for results. The doctor will usually give an initial report after the scope is removed. Other tests may be recommended.   A small amount of bleeding may occur during the first few days after the procedure.     When you return home after the procedure, be sure to follow your doctor's instructions, which may include:   Resume medicines as instructed by your doctor.   Resume normal diet, unless directed otherwise by your doctor.   The sedative will make you drowsy. Avoid driving, operating machinery, or making important decisions for the rest of the day.   Rest for the remainder of the day.     After arriving home, contact your doctor if any of the following occurs:   Bleeding from your rectum, notify your doctor if you pass a teaspoonful of blood or more.   Black, tarry stools   Severe abdominal pain   Hard, swollen abdomen   Signs of infection, including fever or chills   Inability to pass gas or stool   Coughing, shortness of breath, chest pain, severe nausea or vomiting     In case of an emergency, CALL 911 .

## 2025-03-21 NOTE — PROGRESS NOTES
endoscopy reveals any abnormalities, appropriate interventions will be taken, including dilation for strictures and polyp removal with biopsy during the colonoscopy. If intermittent obstructions persist, surgical intervention may be considered.    2. Gastroesophageal reflux disease.  He will discontinue famotidine and start taking pantoprazole 40 mg once daily, 10 minutes before breakfast. A prescription for pantoprazole will be sent to his pharmacy.    PROCEDURE  The patient underwent an endoscopic dilation procedure performed by a gastroenterologist in the past, which provided temporary relief.    I have explained the risks, benefits, alternatives, and potential complications associated with the proposed procedure to be performed and other issues when applicable with the patient/responsible party prior to the procedure. All of their questions were answered as appropriate and to their satisfaction. They understand and agree to the procedure.         The patient (or guardian, if applicable) and other individuals in attendance with the patient were advised that Artificial Intelligence will be utilized during this visit to record, process the conversation to generate a clinical note, and support improvement of the AI technology. The patient (or guardian, if applicable) and other individuals in attendance at the appointment consented to the use of AI, including the recording.                    Mihai Diamond,     CC: Adolfo Gomez MD

## 2025-03-25 ENCOUNTER — TELEPHONE (OUTPATIENT)
Dept: SURGERY | Age: 70
End: 2025-03-25

## 2025-03-25 NOTE — TELEPHONE ENCOUNTER
Scheduled patient for EGD with dilation and diagnostic colonoscopy on 4/24/25 @ 1:45pm at [facility] . Patient needs to report at the front entrance 1 hour before the procedure, NPO after the midnight the night before the procedure. No ASA products for 5 days. Do not stop Aspirin 81mg. MA left voicemail for patient. Instruction letter mailed. Encouraged to call our office if any questions.     Electronically signed by DOROTHY REYES MA on 3/25/2025 at 2:41 PM

## 2025-04-15 ENCOUNTER — HOSPITAL ENCOUNTER (OUTPATIENT)
Dept: CT IMAGING | Age: 70
Discharge: HOME | End: 2025-04-17
Payer: MEDICARE

## 2025-04-15 DIAGNOSIS — R91.1 SOLITARY PULMONARY NODULE: ICD-10-CM

## 2025-04-15 PROCEDURE — 71250 CT THORAX DX C-: CPT

## 2025-04-16 ENCOUNTER — RESULTS FOLLOW-UP (OUTPATIENT)
Dept: FAMILY MEDICINE CLINIC | Age: 70
End: 2025-04-16

## 2025-04-16 DIAGNOSIS — R91.8 PULMONARY NODULES: Primary | ICD-10-CM

## 2025-04-22 NOTE — PROGRESS NOTES
[facility] PRE-ADMISSION TESTING INSTRUCTIONS    The Preadmission Testing patient is instructed accordingly using the following criteria (check applicable):    ARRIVAL INSTRUCTIONS:  [x] Parking the day of Surgery is located in the Main Entrance lot.  Upon entering the door, make an immediate right to the surgery reception desk    [x] Bring photo ID and insurance card    [] Bring in a copy of Living will or Durable Power of  papers.    [x] Please be sure to arrange for a responsible adult to provide transportation to and from the hospital    [x] Please arrange for a responsible adult to be with you for the 24 hour period post procedure due to having anesthesia    [x] If you awake am of surgery not feeling well or have temperature >100 please call [phone]    GENERAL INSTRUCTIONS:    [x], may have up to 8oz of water until 4 hours prior to surgery. No gum, no candy, no mints. NPO time:       [x] You may brush your teeth, do not swallow any toothpaste    [x] Take medications as instructed     [x] Stop herbal supplements and vitamins 5 days prior to procedure    [x] Follow preop dosing of blood thinners per physician instructions    [] Take 1/2 dose of evening insulin, but no insulin after midnight    [] No oral diabetic medications after midnight    [] If diabetic and have low blood sugar or feel symptomatic, take 1-2oz apple juice only    [x] Bring inhalers day of surgery    [] Bring urine specimen day of surgery    [x] Shower or bath with soap, lather and rinse well, AM of Surgery, no lotion, powders or creams to surgical site    [x] Follow bowel prep as instructed per surgeon    [x] No tobacco products within 24 hours of surgery     [x] No alcohol or illegal drug use, marijuana included, within 24 hours of surgery.    [x] Jewelry, body piercing's, eyeglasses, contact lenses and dentures are not permitted into surgery (bring cases)      [x] Please do not wear any nail polish,

## 2025-04-24 ENCOUNTER — HOSPITAL ENCOUNTER (OUTPATIENT)
Age: 70
Setting detail: OUTPATIENT SURGERY
Discharge: HOME | End: 2025-04-24
Attending: STUDENT IN AN ORGANIZED HEALTH CARE EDUCATION/TRAINING PROGRAM | Admitting: STUDENT IN AN ORGANIZED HEALTH CARE EDUCATION/TRAINING PROGRAM
Payer: MEDICARE

## 2025-04-24 ENCOUNTER — ANESTHESIA EVENT (OUTPATIENT)
Dept: ENDOSCOPY | Age: 70
End: 2025-04-24
Payer: MEDICARE

## 2025-04-24 ENCOUNTER — ANESTHESIA (OUTPATIENT)
Dept: ENDOSCOPY | Age: 70
End: 2025-04-24
Payer: MEDICARE

## 2025-04-24 VITALS
RESPIRATION RATE: 20 BRPM | BODY MASS INDEX: 38.19 KG/M2 | HEIGHT: 68 IN | OXYGEN SATURATION: 92 % | DIASTOLIC BLOOD PRESSURE: 60 MMHG | TEMPERATURE: 97.4 F | WEIGHT: 252 LBS | SYSTOLIC BLOOD PRESSURE: 103 MMHG | HEART RATE: 71 BPM

## 2025-04-24 DIAGNOSIS — R14.0 BLOATING: ICD-10-CM

## 2025-04-24 DIAGNOSIS — K59.00 CONSTIPATION, UNSPECIFIED CONSTIPATION TYPE: ICD-10-CM

## 2025-04-24 DIAGNOSIS — R13.10 DYSPHAGIA, UNSPECIFIED TYPE: ICD-10-CM

## 2025-04-24 PROCEDURE — 88305 TISSUE EXAM BY PATHOLOGIST: CPT

## 2025-04-24 PROCEDURE — 3700000000 HC ANESTHESIA ATTENDED CARE: Performed by: STUDENT IN AN ORGANIZED HEALTH CARE EDUCATION/TRAINING PROGRAM

## 2025-04-24 PROCEDURE — 3700000001 HC ADD 15 MINUTES (ANESTHESIA): Performed by: STUDENT IN AN ORGANIZED HEALTH CARE EDUCATION/TRAINING PROGRAM

## 2025-04-24 PROCEDURE — 43251 EGD REMOVE LESION SNARE: CPT | Performed by: STUDENT IN AN ORGANIZED HEALTH CARE EDUCATION/TRAINING PROGRAM

## 2025-04-24 PROCEDURE — 2709999900 HC NON-CHARGEABLE SUPPLY: Performed by: STUDENT IN AN ORGANIZED HEALTH CARE EDUCATION/TRAINING PROGRAM

## 2025-04-24 PROCEDURE — 3609017700 HC EGD DILATION GASTRIC/DUODENAL STRICTURE: Performed by: STUDENT IN AN ORGANIZED HEALTH CARE EDUCATION/TRAINING PROGRAM

## 2025-04-24 PROCEDURE — 2580000003 HC RX 258: Performed by: REGISTERED NURSE

## 2025-04-24 PROCEDURE — 6360000002 HC RX W HCPCS: Performed by: REGISTERED NURSE

## 2025-04-24 PROCEDURE — 43450 DILATE ESOPHAGUS 1/MULT PASS: CPT | Performed by: STUDENT IN AN ORGANIZED HEALTH CARE EDUCATION/TRAINING PROGRAM

## 2025-04-24 PROCEDURE — 45380 COLONOSCOPY AND BIOPSY: CPT | Performed by: STUDENT IN AN ORGANIZED HEALTH CARE EDUCATION/TRAINING PROGRAM

## 2025-04-24 PROCEDURE — 88342 IMHCHEM/IMCYTCHM 1ST ANTB: CPT

## 2025-04-24 PROCEDURE — 7100000011 HC PHASE II RECOVERY - ADDTL 15 MIN: Performed by: STUDENT IN AN ORGANIZED HEALTH CARE EDUCATION/TRAINING PROGRAM

## 2025-04-24 PROCEDURE — 3609010300 HC COLONOSCOPY W/BIOPSY SINGLE/MULTIPLE: Performed by: STUDENT IN AN ORGANIZED HEALTH CARE EDUCATION/TRAINING PROGRAM

## 2025-04-24 PROCEDURE — 43239 EGD BIOPSY SINGLE/MULTIPLE: CPT | Performed by: STUDENT IN AN ORGANIZED HEALTH CARE EDUCATION/TRAINING PROGRAM

## 2025-04-24 PROCEDURE — 7100000010 HC PHASE II RECOVERY - FIRST 15 MIN: Performed by: STUDENT IN AN ORGANIZED HEALTH CARE EDUCATION/TRAINING PROGRAM

## 2025-04-24 RX ORDER — PROPOFOL 10 MG/ML
INJECTION, EMULSION INTRAVENOUS
Status: DISCONTINUED | OUTPATIENT
Start: 2025-04-24 | End: 2025-04-24 | Stop reason: SDUPTHER

## 2025-04-24 RX ORDER — PHENYLEPHRINE HCL IN 0.9% NACL 1 MG/10 ML
SYRINGE (ML) INTRAVENOUS
Status: DISCONTINUED | OUTPATIENT
Start: 2025-04-24 | End: 2025-04-24 | Stop reason: SDUPTHER

## 2025-04-24 RX ORDER — SODIUM CHLORIDE, SODIUM LACTATE, POTASSIUM CHLORIDE, CALCIUM CHLORIDE 600; 310; 30; 20 MG/100ML; MG/100ML; MG/100ML; MG/100ML
INJECTION, SOLUTION INTRAVENOUS CONTINUOUS
Status: DISCONTINUED | OUTPATIENT
Start: 2025-04-24 | End: 2025-04-24 | Stop reason: HOSPADM

## 2025-04-24 RX ORDER — LIDOCAINE HYDROCHLORIDE 20 MG/ML
INJECTION, SOLUTION EPIDURAL; INFILTRATION; INTRACAUDAL; PERINEURAL
Status: DISCONTINUED | OUTPATIENT
Start: 2025-04-24 | End: 2025-04-24 | Stop reason: SDUPTHER

## 2025-04-24 RX ORDER — SODIUM CHLORIDE 9 MG/ML
25 INJECTION, SOLUTION INTRAVENOUS PRN
Status: DISCONTINUED | OUTPATIENT
Start: 2025-04-24 | End: 2025-04-24 | Stop reason: HOSPADM

## 2025-04-24 RX ORDER — SODIUM CHLORIDE 9 MG/ML
INJECTION, SOLUTION INTRAVENOUS
Status: DISCONTINUED | OUTPATIENT
Start: 2025-04-24 | End: 2025-04-24 | Stop reason: SDUPTHER

## 2025-04-24 RX ORDER — SODIUM CHLORIDE 0.9 % (FLUSH) 0.9 %
5-40 SYRINGE (ML) INJECTION EVERY 12 HOURS SCHEDULED
Status: DISCONTINUED | OUTPATIENT
Start: 2025-04-24 | End: 2025-04-24 | Stop reason: HOSPADM

## 2025-04-24 RX ORDER — SODIUM CHLORIDE 0.9 % (FLUSH) 0.9 %
5-40 SYRINGE (ML) INJECTION PRN
Status: DISCONTINUED | OUTPATIENT
Start: 2025-04-24 | End: 2025-04-24 | Stop reason: HOSPADM

## 2025-04-24 RX ADMIN — LIDOCAINE HYDROCHLORIDE 100 MG: 20 INJECTION, SOLUTION EPIDURAL; INFILTRATION; INTRACAUDAL; PERINEURAL at 12:56

## 2025-04-24 RX ADMIN — PROPOFOL 420 MG: 10 INJECTION, EMULSION INTRAVENOUS at 12:51

## 2025-04-24 RX ADMIN — Medication 100 MCG: at 13:21

## 2025-04-24 RX ADMIN — SODIUM CHLORIDE: 9 INJECTION, SOLUTION INTRAVENOUS at 12:50

## 2025-04-24 ASSESSMENT — PAIN - FUNCTIONAL ASSESSMENT: PAIN_FUNCTIONAL_ASSESSMENT: 0-10

## 2025-04-24 NOTE — ANESTHESIA POSTPROCEDURE EVALUATION
Department of Anesthesiology  Postprocedure Note    Patient: Bob Luther  MRN: [id number]  YOB: 1955  Date of evaluation: 4/24/2025    Procedure Summary       Date: 04/24/25 Room / Location: [place] [address] / [facility]    Anesthesia Start: 1249 Anesthesia Stop: 1328    Procedures:       ESOPHAGOGASTRODUODENOSCOPY DILATATION      COLONOSCOPY DIAGNOSTIC      ESOPHAGOGASTRODUODENOSCOPY POLYP SNARE HOT      ESOPHAGOGASTRODUODENOSCOPY BIOPSY Diagnosis:       Dysphagia, unspecified type      Constipation, unspecified constipation type      Bloating      (Dysphagia, unspecified type [R13.10])      (Constipation, unspecified constipation type [K59.00])      (Bloating [R14.0])    Surgeons: Mihai Diamond DO Responsible Provider: Ludmila Long DO    Anesthesia Type: MAC ASA Status: 3            Anesthesia Type: No value filed.    Lily Phase I: Lily Score: 10    Lily Phase II: Lily Score: 10    Anesthesia Post Evaluation    Patient location during evaluation: PACU  Patient participation: complete - patient participated  Level of consciousness: awake and alert  Airway patency: patent  Nausea & Vomiting: no nausea and no vomiting  Cardiovascular status: hemodynamically stable  Respiratory status: acceptable  Hydration status: euvolemic  Pain management: adequate    No notable events documented.

## 2025-04-24 NOTE — ANESTHESIA PRE PROCEDURE
Department of Anesthesiology  Preprocedure Note       Name:  Bob Luther   Age:  69 y.o.  :  1955                                          MRN:  [id number]         Date:  2025      Surgeon: Surgeon(s):  Mihai Diamond DO    Procedure: Procedure(s):  ESOPHAGOGASTRODUODENOSCOPY DILATATION  COLONOSCOPY DIAGNOSTIC    Medications prior to admission:   Prior to Admission medications    Medication Sig Start Date End Date Taking? Authorizing Provider   Cholecalciferol (VITAMIN D) 50 MCG (2000) CAPS capsule Take 1 capsule by mouth daily   Yes Provider, MD Neville   pantoprazole (PROTONIX) 40 MG tablet Take 1 tablet by mouth daily 3/21/25   Mihai Diamond DO   polyethylene glycol (GLYCOLAX) 17 GM/SCOOP powder Take 17 g by mouth daily 3/21/25 9/17/25  Mihai Diamond DO   dicyclomine (BENTYL) 10 MG capsule Take 1 capsule by mouth 4 times daily (before meals and nightly) 3/18/25   Adolfo Gomez MD   amLODIPine (NORVASC) 10 MG tablet Take 1 tablet by mouth daily 25   Adolfo Gomez MD   albuterol sulfate HFA (PROVENTIL;VENTOLIN;PROAIR) 108 (90 Base) MCG/ACT inhaler Inhale 2 puffs into the lungs every 6 hours as needed for Wheezing 25   Adolfo Gomez MD   aspirin 81 MG EC tablet Take 1 tablet by mouth daily 25   Adolfo Gomez MD   rosuvastatin (CRESTOR) 10 MG tablet Take 1 tablet by mouth daily  Patient taking differently: Take 1 tablet by mouth at bedtime 25   Adolfo Gomez MD       Current medications:    Current Facility-Administered Medications   Medication Dose Route Frequency Provider Last Rate Last Admin    sodium chloride flush 0.9 % injection 5-40 mL  5-40 mL IntraVENous 2 times per day Mihai Diamond DO        sodium chloride flush 0.9 % injection 5-40 mL  5-40 mL IntraVENous PRN Mihai Diamond DO        0.9 % sodium chloride infusion  25 mL IntraVENous PRN Mihai Diamond DO        lactated ringers infusion   IntraVENous Continuous Lobo

## 2025-04-24 NOTE — H&P
[facility] Surgery Clinic Note          Chief Complaint   Patient presents with    Colonoscopy       Bloating, Colonoscopy Consult, hx sbo  Pt states has had bloating since sbo about a month ago, bentyl has helped but not enough, hardly ever has bowel movements, no urge to go unless he takes stool softeners and laxatives, also states can't swallow pills anymore, food also gets stuck         PCP: Adolfo Gomez MD Ricky L Vogel 69 y.o. male   History of Present Illness  The patient is here for an hospital follow-up from a small bowel obstruction.     He has been experiencing intermittent dysphagia for approximately 5 years, which has recently worsened. He also reports frequent acid reflux. Despite consuming full meals, he experiences bloating. His bowel movements are irregular, often requiring the use of stool softeners to facilitate defecation. He recalls a previous endoscopic dilation procedure performed by a gastroenterologist, which provided temporary relief. His last colonoscopy was conducted 7 years ago, with a recommendation for a repeat procedure in 10 years due to the absence of any abnormalities. He is currently on famotidine for reflux management, having previously been on omeprazole for several years. He also takes pantoprazole as needed. He's had worsening issues since he was hospitalized     MEDICATIONS  Current: Famotidine, pantoprazole (as needed)  Past: Omeprazole        Review of Systems   Constitutional:  Positive for appetite change. Negative for activity change, chills, diaphoresis, fatigue, fever and unexpected weight change.   HENT:  Negative for trouble swallowing.    Respiratory:  Negative for apnea, cough, choking, chest tightness, shortness of breath, wheezing and stridor.    Cardiovascular:  Negative for chest pain.   Gastrointestinal:  Positive for abdominal distention, abdominal pain, constipation and nausea. Negative for anal bleeding, blood in stool, diarrhea and vomiting.  will be performed concurrently. He will start taking MiraLAX daily to alleviate constipation and bloating. He is advised to consume softer foods and take small bites of meat. If he experiences difficulty swallowing or regurgitation, he should seek immediate medical attention at the ER. The risks and benefits of the procedures have been discussed. If the endoscopy reveals any abnormalities, appropriate interventions will be taken, including dilation for strictures and polyp removal with biopsy during the colonoscopy. If intermittent obstructions persist, surgical intervention may be considered.     2. Gastroesophageal reflux disease.  He will discontinue famotidine and start taking pantoprazole 40 mg once daily, 10 minutes before breakfast. A prescription for pantoprazole will be sent to his pharmacy.     PROCEDURE  The patient underwent an endoscopic dilation procedure performed by a gastroenterologist in the past, which provided temporary relief.     I have explained the risks, benefits, alternatives, and potential complications associated with the proposed procedure to be performed and other issues when applicable with the patient/responsible party prior to the procedure. All of their questions were answered as appropriate and to their satisfaction. They understand and agree to the procedure.            The patient (or guardian, if applicable) and other individuals in attendance with the patient were advised that Artificial Intelligence will be utilized during this visit to record, process the conversation to generate a clinical note, and support improvement of the AI technology. The patient (or guardian, if applicable) and other individuals in attendance at the appointment consented to the use of AI, including the recording.                            Mihai Diamond DO

## 2025-04-27 SDOH — HEALTH STABILITY: PHYSICAL HEALTH: ON AVERAGE, HOW MANY MINUTES DO YOU ENGAGE IN EXERCISE AT THIS LEVEL?: 20 MIN

## 2025-04-27 SDOH — HEALTH STABILITY: PHYSICAL HEALTH: ON AVERAGE, HOW MANY DAYS PER WEEK DO YOU ENGAGE IN MODERATE TO STRENUOUS EXERCISE (LIKE A BRISK WALK)?: 1 DAY

## 2025-04-27 ASSESSMENT — LIFESTYLE VARIABLES
HOW MANY STANDARD DRINKS CONTAINING ALCOHOL DO YOU HAVE ON A TYPICAL DAY: 0
HOW OFTEN DO YOU HAVE A DRINK CONTAINING ALCOHOL: NEVER
HOW OFTEN DO YOU HAVE SIX OR MORE DRINKS ON ONE OCCASION: 1
HOW MANY STANDARD DRINKS CONTAINING ALCOHOL DO YOU HAVE ON A TYPICAL DAY: PATIENT DOES NOT DRINK
HOW OFTEN DO YOU HAVE A DRINK CONTAINING ALCOHOL: 1

## 2025-04-27 ASSESSMENT — PATIENT HEALTH QUESTIONNAIRE - PHQ9
1. LITTLE INTEREST OR PLEASURE IN DOING THINGS: NOT AT ALL
SUM OF ALL RESPONSES TO PHQ QUESTIONS 1-9: 1
SUM OF ALL RESPONSES TO PHQ QUESTIONS 1-9: 1
2. FEELING DOWN, DEPRESSED OR HOPELESS: SEVERAL DAYS
SUM OF ALL RESPONSES TO PHQ QUESTIONS 1-9: 1
SUM OF ALL RESPONSES TO PHQ QUESTIONS 1-9: 1

## 2025-04-30 ENCOUNTER — OFFICE VISIT (OUTPATIENT)
Dept: FAMILY MEDICINE CLINIC | Age: 70
End: 2025-04-30
Payer: MEDICARE

## 2025-04-30 VITALS
SYSTOLIC BLOOD PRESSURE: 132 MMHG | BODY MASS INDEX: 39.86 KG/M2 | OXYGEN SATURATION: 96 % | TEMPERATURE: 97.8 F | HEART RATE: 70 BPM | DIASTOLIC BLOOD PRESSURE: 70 MMHG | WEIGHT: 263 LBS | HEIGHT: 68 IN

## 2025-04-30 VITALS
SYSTOLIC BLOOD PRESSURE: 132 MMHG | OXYGEN SATURATION: 96 % | TEMPERATURE: 97.8 F | HEART RATE: 70 BPM | DIASTOLIC BLOOD PRESSURE: 70 MMHG | HEIGHT: 68 IN | BODY MASS INDEX: 39.86 KG/M2 | WEIGHT: 263 LBS

## 2025-04-30 DIAGNOSIS — J45.20 MILD INTERMITTENT ASTHMA WITHOUT COMPLICATION: ICD-10-CM

## 2025-04-30 DIAGNOSIS — Z00.00 INITIAL MEDICARE ANNUAL WELLNESS VISIT: Primary | ICD-10-CM

## 2025-04-30 DIAGNOSIS — G47.33 OBSTRUCTIVE SLEEP APNEA SYNDROME: ICD-10-CM

## 2025-04-30 DIAGNOSIS — I10 ESSENTIAL HYPERTENSION: ICD-10-CM

## 2025-04-30 DIAGNOSIS — R14.0 ABDOMINAL BLOATING: Primary | ICD-10-CM

## 2025-04-30 PROCEDURE — 3078F DIAST BP <80 MM HG: CPT | Performed by: STUDENT IN AN ORGANIZED HEALTH CARE EDUCATION/TRAINING PROGRAM

## 2025-04-30 PROCEDURE — 1159F MED LIST DOCD IN RCRD: CPT | Performed by: STUDENT IN AN ORGANIZED HEALTH CARE EDUCATION/TRAINING PROGRAM

## 2025-04-30 PROCEDURE — 99214 OFFICE O/P EST MOD 30 MIN: CPT | Performed by: STUDENT IN AN ORGANIZED HEALTH CARE EDUCATION/TRAINING PROGRAM

## 2025-04-30 PROCEDURE — G0438 PPPS, INITIAL VISIT: HCPCS | Performed by: STUDENT IN AN ORGANIZED HEALTH CARE EDUCATION/TRAINING PROGRAM

## 2025-04-30 PROCEDURE — 3075F SYST BP GE 130 - 139MM HG: CPT | Performed by: STUDENT IN AN ORGANIZED HEALTH CARE EDUCATION/TRAINING PROGRAM

## 2025-04-30 PROCEDURE — 1123F ACP DISCUSS/DSCN MKR DOCD: CPT | Performed by: STUDENT IN AN ORGANIZED HEALTH CARE EDUCATION/TRAINING PROGRAM

## 2025-04-30 RX ORDER — FLUTICASONE PROPIONATE AND SALMETEROL XINAFOATE 115; 21 UG/1; UG/1
2 AEROSOL, METERED RESPIRATORY (INHALATION) 2 TIMES DAILY
Qty: 12 EACH | Refills: 3
Start: 2025-04-30

## 2025-04-30 RX ORDER — AMLODIPINE BESYLATE 10 MG/1
10 TABLET ORAL DAILY
Qty: 30 TABLET | Refills: 5 | Status: SHIPPED | OUTPATIENT
Start: 2025-04-30

## 2025-04-30 NOTE — PATIENT INSTRUCTIONS
Learning About Being Active as an Older Adult  Why is being active important as you get older?     Being active is one of the best things you can do for your health. And it's never too late to start. Being active--or getting active, if you aren't already--has definite benefits. It can:  Give you more energy,  Keep your mind sharp.  Improve balance to reduce your risk of falls.  Help you manage chronic illness with fewer medicines.  No matter how old you are, how fit you are, or what health problems you have, there is a form of activity that will work for you. And the more physical activity you can do, the better your overall health will be.  What kinds of activity can help you stay healthy?  Being more active will make your daily activities easier. Physical activity includes planned exercise and things you do in daily life. There are four types of activity:  Aerobic.  Doing aerobic activity makes your heart and lungs strong.  Includes walking, dancing, and gardening.  Aim for at least 2½ hours spread throughout the week.  It improves your energy and can help you sleep better.  Muscle-strengthening.  This type of activity can help maintain muscle and strengthen bones.  Includes climbing stairs, using resistance bands, and lifting or carrying heavy loads.  Aim for at least twice a week.  It can help protect the knees and other joints.  Stretching.  Stretching gives you better range of motion in joints and muscles.  Includes upper arm stretches, calf stretches, and gentle yoga.  Aim for at least twice a week, preferably after your muscles are warmed up from other activities.  It can help you function better in daily life.  Balancing.  This helps you stay coordinated and have good posture.  Includes heel-to-toe walking, melissa chi, and certain types of yoga.  Aim for at least 3 days a week.  It can reduce your risk of falling.  Even if you have a hard time meeting the recommendations, it's better to be more active

## 2025-04-30 NOTE — PROGRESS NOTES
Medicare Annual Wellness Visit    Bob Luther is here for Medicare AWV    Assessment & Plan  1. Breathing issues.  - Experiencing intermittent breathing issues for the past 25 to 30 years, recently becoming constant.  - Physical exam findings indicate no acute distress; lung sounds clear upon auscultation.  - Referral to a gastroenterologist has been initiated for further evaluation; sleep study to be scheduled within 2 weeks.  - Prescribed a new daily inhaler to manage breathing difficulties; follow-up in 2 months to assess effectiveness.    2. Gastrointestinal issues.  - Reported ongoing gastrointestinal problems for 25 to 30 years, now constant.  - No recent falls or injuries reported; diet includes Metamucil for fiber intake.  - Referral to a gastroenterologist has been initiated for further evaluation.  - Advised to maintain a balanced diet with adequate fiber and protein intake.    3. Dental care.  - Last dental visit was approximately 8 years ago; difficulty finding a dentist due to insurance issues.  - No current dental complaints; advised to establish care with a local dentist to avoid delays in treatment for potential issues.  - Discussed importance of regular dental check-ups every 6 months.  - Encouraged to seek a dentist covered by insurance to ensure timely care.    4. Physical activity.  - Patient reports minimal physical activity; engages in simple activities and walking.  - No issues with short-term memory or recent falls.  - Encouraged to increase physical activity to improve overall health.  - Recommended walking and other forms of exercise to stay active.  Initial Medicare annual wellness visit    Results       Return in 2 months (on 6/30/2025) for Medicare Annual Wellness Visit in 1 year.     Subjective   History of Present Illness  The patient presents for evaluation of breathing issues.    No recent falls or injuries are reported. Physical activity is limited, primarily consisting of

## 2025-04-30 NOTE — PROGRESS NOTES
[facility] [place] [facility]  [address] [place] [id number]  Dept: [phone]  Dept Fax: [phone]   DATE OF VISIT : 2025      Patient:  Bob Luther  Age: 69 y.o.       : 1955      Chief complaint:   Chief Complaint   Patient presents with    Follow-up     Abdominal pain/bloating.  Pt states had a colonoscopy/EGD done on 25         History of Present Illness     History of Present Illness  The patient presents for evaluation of abdominal bloating and pain, shortness of breath, and sleep apnea.    Abdominal bloating and pain occur daily after meals, regardless of the type of food consumed. Pain typically manifests within 10 to 15 minutes postprandially. Bloating is reported to be so severe at times that it impedes the ability to take a full breath. Care has been provided by Dr. Diamond, a gastroenterologist, and a colonoscopy and upper GI endoscopy were performed on 2025. Bentyl was prescribed to be taken up to 4 times daily, but the supply has been exhausted.    Frequent shortness of breath is reported, necessitating the use of a CPAP machine. Over the past 2 weeks, 2 episodes of dyspnea at night required the removal of the CPAP mask and sitting up to catch breath. Obesity is acknowledged, and becoming easily winded is noted. Albuterol is currently used as needed, which provides relief. Occasional wheezing is experienced, but there is no history of asthma. The last sleep study was conducted approximately 5 years ago.    SOCIAL HISTORY  He does not smoke.      Medication List:    Current Outpatient Medications   Medication Sig Dispense Refill    amLODIPine (NORVASC) 10 MG tablet Take 1 tablet by mouth daily 30 tablet 5    ADVAIR -21 MCG/ACT inhaler Inhale 2 puffs into the lungs 2 times daily 12 each 3    pantoprazole (PROTONIX) 40 MG tablet Take 1 tablet by mouth daily 30 tablet 3    polyethylene glycol (GLYCOLAX) 17

## 2025-05-02 LAB — SURGICAL PATHOLOGY REPORT: NORMAL

## 2025-05-06 ENCOUNTER — INITIAL CONSULT (OUTPATIENT)
Age: 70
End: 2025-05-06
Payer: MEDICARE

## 2025-05-06 VITALS
DIASTOLIC BLOOD PRESSURE: 84 MMHG | HEART RATE: 72 BPM | BODY MASS INDEX: 41.75 KG/M2 | WEIGHT: 266 LBS | TEMPERATURE: 97.5 F | SYSTOLIC BLOOD PRESSURE: 136 MMHG | OXYGEN SATURATION: 96 % | HEIGHT: 67 IN | RESPIRATION RATE: 16 BRPM

## 2025-05-06 DIAGNOSIS — K59.00 CONSTIPATION, UNSPECIFIED CONSTIPATION TYPE: ICD-10-CM

## 2025-05-06 DIAGNOSIS — R14.0 BLOATING: Primary | ICD-10-CM

## 2025-05-06 PROCEDURE — 1123F ACP DISCUSS/DSCN MKR DOCD: CPT | Performed by: NURSE PRACTITIONER

## 2025-05-06 PROCEDURE — 3079F DIAST BP 80-89 MM HG: CPT | Performed by: NURSE PRACTITIONER

## 2025-05-06 PROCEDURE — 3075F SYST BP GE 130 - 139MM HG: CPT | Performed by: NURSE PRACTITIONER

## 2025-05-06 PROCEDURE — 99203 OFFICE O/P NEW LOW 30 MIN: CPT | Performed by: NURSE PRACTITIONER

## 2025-05-06 PROCEDURE — 1159F MED LIST DOCD IN RCRD: CPT | Performed by: NURSE PRACTITIONER

## 2025-05-06 RX ORDER — PLECANATIDE 3 MG/1
3 TABLET ORAL DAILY
Qty: 90 TABLET | Refills: 3 | Status: SHIPPED | OUTPATIENT
Start: 2025-05-06

## 2025-05-06 NOTE — PROGRESS NOTES
GASTROINTESTINAL ENDOSCOPY  06/2022    dilation    UPPER GASTROINTESTINAL ENDOSCOPY N/A 4/24/2025    ESOPHAGOGASTRODUODENOSCOPY DILATATION performed by Mihai Diamond DO at [facility] ENDOSCOPY      Family History   Problem Relation Age of Onset    Hypertension Mother     Heart Failure Mother     Cancer Father         bone        Lab Results   Component Value Date    WBC 6.8 02/14/2025    HGB 14.6 02/14/2025    HCT 46.2 02/14/2025    MCV 90.6 02/14/2025     02/14/2025      Lab Results   Component Value Date     02/14/2025    K 3.4 (L) 02/14/2025     02/14/2025    CO2 26 02/14/2025    BUN 7 02/14/2025    CREATININE 0.8 02/14/2025    GLUCOSE 94 02/14/2025    CALCIUM 9.0 02/14/2025    BILITOT 0.3 02/12/2025    ALKPHOS 81 02/12/2025    AST 37 02/12/2025    ALT 33 02/12/2025    LABGLOM >90 02/14/2025    GFRAA >60 07/18/2022             Assessment & Plan          ASSESSMENT/PLAN:    1. Bloating  -     XR ABDOMEN (KUB) (SINGLE AP VIEW); Future  2. Constipation, unspecified constipation type  -     XR ABDOMEN (KUB) (SINGLE AP VIEW); Future    -colonoscopy and EGD reviewed along with CT scan and abdominal xray  -we discussed the diagnosis of SIBO.  Patient will trial Xifaxin 200 mg three times a day  -trulance prescribed with instructions  -abdominal xray ordered to determine stool content.  Will provide recommendations once reviewed      Return for Follow up secured.    An electronic signature was used to authenticate this note.    --Kellee Hudson, CLARY - CNP

## 2025-05-07 ENCOUNTER — HOSPITAL ENCOUNTER (OUTPATIENT)
Dept: GENERAL RADIOLOGY | Age: 70
Discharge: HOME | End: 2025-05-09
Payer: MEDICARE

## 2025-05-07 DIAGNOSIS — K59.00 CONSTIPATION, UNSPECIFIED CONSTIPATION TYPE: ICD-10-CM

## 2025-05-07 DIAGNOSIS — R14.0 BLOATING: ICD-10-CM

## 2025-05-07 PROCEDURE — 74018 RADEX ABDOMEN 1 VIEW: CPT

## 2025-05-09 ENCOUNTER — TELEPHONE (OUTPATIENT)
Dept: FAMILY MEDICINE CLINIC | Age: 70
End: 2025-05-09

## 2025-05-09 DIAGNOSIS — G47.33 OBSTRUCTIVE SLEEP APNEA SYNDROME: Primary | ICD-10-CM

## 2025-05-09 NOTE — TELEPHONE ENCOUNTER
Stew at the sleep center called.    Pt needs order for a Diagnostic sleep study.  His machine is too old for a download of information, which is required by insurance.  When this happens, pt must get a diagnostic sleep study done.    Please update order.    Could notify Stew of update @ [phone]

## 2025-05-14 ENCOUNTER — RESULTS FOLLOW-UP (OUTPATIENT)
Age: 70
End: 2025-05-14

## 2025-05-14 RX ORDER — LUBIPROSTONE 24 UG/1
24 CAPSULE ORAL 2 TIMES DAILY WITH MEALS
Qty: 60 CAPSULE | Refills: 3 | Status: SHIPPED | OUTPATIENT
Start: 2025-05-14

## 2025-05-16 ENCOUNTER — OFFICE VISIT (OUTPATIENT)
Dept: SURGERY | Age: 70
End: 2025-05-16
Payer: MEDICARE

## 2025-05-16 ENCOUNTER — HOSPITAL ENCOUNTER (EMERGENCY)
Age: 70
Discharge: HOME | End: 2025-05-16
Payer: MEDICARE

## 2025-05-16 ENCOUNTER — TELEPHONE (OUTPATIENT)
Dept: FAMILY MEDICINE CLINIC | Age: 70
End: 2025-05-16

## 2025-05-16 ENCOUNTER — APPOINTMENT (OUTPATIENT)
Dept: CT IMAGING | Age: 70
End: 2025-05-16
Payer: MEDICARE

## 2025-05-16 VITALS
RESPIRATION RATE: 18 BRPM | DIASTOLIC BLOOD PRESSURE: 84 MMHG | HEIGHT: 67 IN | OXYGEN SATURATION: 97 % | SYSTOLIC BLOOD PRESSURE: 146 MMHG | BODY MASS INDEX: 41.75 KG/M2 | WEIGHT: 266 LBS | HEART RATE: 78 BPM | TEMPERATURE: 98.1 F

## 2025-05-16 VITALS
SYSTOLIC BLOOD PRESSURE: 135 MMHG | WEIGHT: 260 LBS | DIASTOLIC BLOOD PRESSURE: 86 MMHG | HEART RATE: 67 BPM | RESPIRATION RATE: 16 BRPM | OXYGEN SATURATION: 96 % | BODY MASS INDEX: 40.81 KG/M2 | TEMPERATURE: 97.7 F | HEIGHT: 67 IN

## 2025-05-16 DIAGNOSIS — R10.84 GENERALIZED ABDOMINAL PAIN: Primary | ICD-10-CM

## 2025-05-16 DIAGNOSIS — R14.0 ABDOMINAL BLOATING: Primary | ICD-10-CM

## 2025-05-16 DIAGNOSIS — K59.00 CONSTIPATION, UNSPECIFIED CONSTIPATION TYPE: ICD-10-CM

## 2025-05-16 DIAGNOSIS — R14.0 ABDOMINAL BLOATING: ICD-10-CM

## 2025-05-16 LAB
ALBUMIN SERPL-MCNC: 4.1 G/DL (ref 3.5–5.2)
ALP SERPL-CCNC: 86 U/L (ref 40–129)
ALT SERPL-CCNC: 24 U/L (ref 0–40)
ANION GAP SERPL CALCULATED.3IONS-SCNC: 11 MMOL/L (ref 7–16)
AST SERPL-CCNC: 22 U/L (ref 0–39)
BASOPHILS # BLD: 0.04 K/UL (ref 0–0.2)
BASOPHILS NFR BLD: 1 % (ref 0–2)
BILIRUB SERPL-MCNC: 0.4 MG/DL (ref 0–1.2)
BUN SERPL-MCNC: 12 MG/DL (ref 6–23)
CALCIUM SERPL-MCNC: 8.9 MG/DL (ref 8.6–10.2)
CHLORIDE SERPL-SCNC: 107 MMOL/L (ref 98–107)
CO2 SERPL-SCNC: 24 MMOL/L (ref 22–29)
CREAT SERPL-MCNC: 0.9 MG/DL (ref 0.7–1.2)
EOSINOPHIL # BLD: 0.26 K/UL (ref 0.05–0.5)
EOSINOPHILS RELATIVE PERCENT: 4 % (ref 0–6)
ERYTHROCYTE [DISTWIDTH] IN BLOOD BY AUTOMATED COUNT: 14.7 % (ref 11.5–15)
GFR, ESTIMATED: 88 ML/MIN/1.73M2
GLUCOSE SERPL-MCNC: 129 MG/DL (ref 74–99)
HCT VFR BLD AUTO: 41.3 % (ref 37–54)
HGB BLD-MCNC: 13 G/DL (ref 12.5–16.5)
IMM GRANULOCYTES # BLD AUTO: 0.03 K/UL (ref 0–0.58)
IMM GRANULOCYTES NFR BLD: 0 % (ref 0–5)
LACTATE BLDV-SCNC: 1.6 MMOL/L (ref 0.5–2.2)
LIPASE SERPL-CCNC: 44 U/L (ref 13–60)
LYMPHOCYTES NFR BLD: 1.75 K/UL (ref 1.5–4)
LYMPHOCYTES RELATIVE PERCENT: 26 % (ref 20–42)
MCH RBC QN AUTO: 28.6 PG (ref 26–35)
MCHC RBC AUTO-ENTMCNC: 31.5 G/DL (ref 32–34.5)
MCV RBC AUTO: 91 FL (ref 80–99.9)
MONOCYTES NFR BLD: 0.64 K/UL (ref 0.1–0.95)
MONOCYTES NFR BLD: 9 % (ref 2–12)
NEUTROPHILS NFR BLD: 60 % (ref 43–80)
NEUTS SEG NFR BLD: 4.09 K/UL (ref 1.8–7.3)
PLATELET # BLD AUTO: 226 K/UL (ref 130–450)
PMV BLD AUTO: 10.1 FL (ref 7–12)
POTASSIUM SERPL-SCNC: 4 MMOL/L (ref 3.5–5)
PROT SERPL-MCNC: 6.8 G/DL (ref 6.4–8.3)
RBC # BLD AUTO: 4.54 M/UL (ref 3.8–5.8)
SODIUM SERPL-SCNC: 142 MMOL/L (ref 132–146)
WBC OTHER # BLD: 6.8 K/UL (ref 4.5–11.5)

## 2025-05-16 PROCEDURE — 1126F AMNT PAIN NOTED NONE PRSNT: CPT | Performed by: STUDENT IN AN ORGANIZED HEALTH CARE EDUCATION/TRAINING PROGRAM

## 2025-05-16 PROCEDURE — 2580000003 HC RX 258: Performed by: PHYSICIAN ASSISTANT

## 2025-05-16 PROCEDURE — 6360000004 HC RX CONTRAST MEDICATION: Performed by: RADIOLOGY

## 2025-05-16 PROCEDURE — 83690 ASSAY OF LIPASE: CPT

## 2025-05-16 PROCEDURE — 85025 COMPLETE CBC W/AUTO DIFF WBC: CPT

## 2025-05-16 PROCEDURE — 96374 THER/PROPH/DIAG INJ IV PUSH: CPT

## 2025-05-16 PROCEDURE — 6360000002 HC RX W HCPCS: Performed by: PHYSICIAN ASSISTANT

## 2025-05-16 PROCEDURE — 80053 COMPREHEN METABOLIC PANEL: CPT

## 2025-05-16 PROCEDURE — 1159F MED LIST DOCD IN RCRD: CPT | Performed by: STUDENT IN AN ORGANIZED HEALTH CARE EDUCATION/TRAINING PROGRAM

## 2025-05-16 PROCEDURE — 3079F DIAST BP 80-89 MM HG: CPT | Performed by: STUDENT IN AN ORGANIZED HEALTH CARE EDUCATION/TRAINING PROGRAM

## 2025-05-16 PROCEDURE — 1123F ACP DISCUSS/DSCN MKR DOCD: CPT | Performed by: STUDENT IN AN ORGANIZED HEALTH CARE EDUCATION/TRAINING PROGRAM

## 2025-05-16 PROCEDURE — 3077F SYST BP >= 140 MM HG: CPT | Performed by: STUDENT IN AN ORGANIZED HEALTH CARE EDUCATION/TRAINING PROGRAM

## 2025-05-16 PROCEDURE — 99213 OFFICE O/P EST LOW 20 MIN: CPT | Performed by: STUDENT IN AN ORGANIZED HEALTH CARE EDUCATION/TRAINING PROGRAM

## 2025-05-16 PROCEDURE — 83605 ASSAY OF LACTIC ACID: CPT

## 2025-05-16 PROCEDURE — 74177 CT ABD & PELVIS W/CONTRAST: CPT

## 2025-05-16 PROCEDURE — 99285 EMERGENCY DEPT VISIT HI MDM: CPT

## 2025-05-16 RX ORDER — METOCLOPRAMIDE 10 MG/1
10 TABLET ORAL
Qty: 120 TABLET | Refills: 3 | Status: SHIPPED | OUTPATIENT
Start: 2025-05-16 | End: 2025-05-16

## 2025-05-16 RX ORDER — KETOROLAC TROMETHAMINE 15 MG/ML
15 INJECTION, SOLUTION INTRAMUSCULAR; INTRAVENOUS ONCE
Status: COMPLETED | OUTPATIENT
Start: 2025-05-16 | End: 2025-05-16

## 2025-05-16 RX ORDER — POLYETHYLENE GLYCOL 3350 17 G/17G
17 POWDER, FOR SOLUTION ORAL 2 TIMES DAILY
Qty: 1530 G | Refills: 1 | Status: SHIPPED | OUTPATIENT
Start: 2025-05-16 | End: 2025-08-14

## 2025-05-16 RX ORDER — POLYETHYLENE GLYCOL 3350 17 G/17G
17 POWDER, FOR SOLUTION ORAL 2 TIMES DAILY
Qty: 1530 G | Refills: 1 | Status: SHIPPED | OUTPATIENT
Start: 2025-05-16 | End: 2025-05-16

## 2025-05-16 RX ORDER — 0.9 % SODIUM CHLORIDE 0.9 %
1000 INTRAVENOUS SOLUTION INTRAVENOUS ONCE
Status: COMPLETED | OUTPATIENT
Start: 2025-05-16 | End: 2025-05-16

## 2025-05-16 RX ORDER — METOCLOPRAMIDE 10 MG/1
10 TABLET ORAL
Qty: 120 TABLET | Refills: 3 | Status: SHIPPED | OUTPATIENT
Start: 2025-05-16

## 2025-05-16 RX ORDER — IOPAMIDOL 755 MG/ML
75 INJECTION, SOLUTION INTRAVASCULAR
Status: COMPLETED | OUTPATIENT
Start: 2025-05-16 | End: 2025-05-16

## 2025-05-16 RX ADMIN — IOPAMIDOL 75 ML: 755 INJECTION, SOLUTION INTRAVENOUS at 10:49

## 2025-05-16 RX ADMIN — SODIUM CHLORIDE 1000 ML: 0.9 INJECTION, SOLUTION INTRAVENOUS at 10:11

## 2025-05-16 RX ADMIN — KETOROLAC TROMETHAMINE 15 MG: 15 INJECTION, SOLUTION INTRAMUSCULAR; INTRAVENOUS at 10:12

## 2025-05-16 ASSESSMENT — ENCOUNTER SYMPTOMS
STRIDOR: 0
VOMITING: 0
CONSTIPATION: 1
ANAL BLEEDING: 0
TROUBLE SWALLOWING: 0
DIARRHEA: 0
COLOR CHANGE: 0
APNEA: 0
SHORTNESS OF BREATH: 0
ABDOMINAL PAIN: 1
CHOKING: 0
NAUSEA: 1
ABDOMINAL DISTENTION: 1
WHEEZING: 0
CHEST TIGHTNESS: 0
BLOOD IN STOOL: 0
BACK PAIN: 1
COUGH: 0

## 2025-05-16 ASSESSMENT — PAIN - FUNCTIONAL ASSESSMENT: PAIN_FUNCTIONAL_ASSESSMENT: NONE - DENIES PAIN

## 2025-05-16 NOTE — ED PROVIDER NOTES
Independent ELLE Visit.             [facility] EMERGENCY DEPARTMENT  ED  Encounter Note  Admit Date/RoomTime: 2025  9:36 AM  ED Room:   NAME: Bob Luther  : 1955  MRN: [id number]  PCP: Adolfo Gomez MD    CHIEF COMPLAINT     Abdominal Pain (Onset February, hx SBO)    HISTORY OF PRESENT ILLNESS        Bob Luther is a 69 y.o. male who presents to the ED by private vehicle for abdominal bloating and discomfort, beginning a few month(s) ago. The complaint has been persistent and are moderate in severity.  The patient states that he was sent over from his surgeons office after being evaluated.  He was seen and admitted here in February for small bowel obstruction.  Was followed by Dr. Diamond.  States that afterward he really just never fully recovered.  Feels like he has had persistent abdominal bloating and discomfort.  He has had EGD and colonoscopy and both of these were fine.  States that in spite of this his abdomen is so bloated that now he is having difficulty with his breathing.  He feels discomfort in the abdomen primarily from the bloating.  The patient reports difficulty moving his bowels.  He has not had any sort of stool passed for 3 to 4 days.  He does pass some gas.  Denies nausea or vomiting.  No fever or chills.  Denies hematuria, dysuria or polyuria.  Sent over from the surgeons office for further evaluation      REVIEW OF SYSTEMS     Pertinent positives and negatives are stated within HPI, all other systems reviewed and are negative.    Past Medical History:  has a past medical history of Arthritis, Cancer (HCC), Dysphagia, Hyperlipidemia, Hypertension, SHARYN on CPAP, and Osteoarthritis.  Surgical History:  has a past surgical history that includes joint replacement; shoulder surgery (Bilateral); Upper gastrointestinal endoscopy (2022); Tonsillectomy and adenoidectomy; Total knee arthroplasty (Left, 2022); Colonoscopy; Upper gastrointestinal

## 2025-05-16 NOTE — TELEPHONE ENCOUNTER
CoverMyMeds notification regarding Advair:      The following medications are likely not required for a PA    Breo Ellipta, Budesonuide  Formoterol Fumarate        Also, pt needs to try and fail 2 inhalers prior to starting Advair.      Messaged patient regarding which 2 inhalers he has tried in the past, but have not received anything from the patient yet.      CoverMyMeds documentation scanned into the chart

## 2025-05-16 NOTE — PROGRESS NOTES
[facility] General Surgery Clinic Note    Chief Complaint   Patient presents with    Follow-up      EGD w/dilation, diagnostic colonoscopy, 4/24  Pt states still bloated, can't have bowel movements, also has not slept in around 4-5 days due to abdominal discomfort    Results       PCP: Adolfo Gomez MD Ricky L Vogel 69 y.o. male   History of Present Illness  The patient presents for a follow-up of EGD and colonoscopy.    He reports persistent bloating since his colonoscopy, which has escalated to the point of affecting his respiratory function. He has not experienced any episodes of vomiting. His bowel movements have been infrequent, with the last one occurring 4 days ago. An x-ray was performed last week which showed stomach dilation and constipation. He expresses frustration with his current condition and reluctance to visit the emergency room, citing previous visits that lasted up to 16 hours. He has been unable to start Xifaxan.    Additionally, he reports severe numbness in his left arm, which he describes as feeling like it is \"going to sleep.\" This symptom is causing him significant discomfort. He has no history of neck problems.      Review of Systems   Constitutional:  Positive for activity change and appetite change. Negative for chills, diaphoresis, fatigue, fever and unexpected weight change.   HENT:  Negative for trouble swallowing.    Respiratory:  Negative for apnea, cough, choking, chest tightness, shortness of breath, wheezing and stridor.    Cardiovascular:  Negative for chest pain.   Gastrointestinal:  Positive for abdominal distention, abdominal pain, constipation and nausea. Negative for anal bleeding, blood in stool, diarrhea and vomiting.   Musculoskeletal:  Positive for arthralgias, back pain, myalgias and neck pain.   Skin:  Negative for color change, pallor and wound.   Neurological:  Positive for numbness. Negative for dizziness and light-headedness.   Psychiatric/Behavioral:  Negative for

## 2025-05-19 ENCOUNTER — TELEPHONE (OUTPATIENT)
Dept: FAMILY MEDICINE CLINIC | Age: 70
End: 2025-05-19

## 2025-05-19 RX ORDER — FLUTICASONE FUROATE AND VILANTEROL 100; 25 UG/1; UG/1
1 POWDER RESPIRATORY (INHALATION) DAILY
Qty: 60 EACH | Refills: 2 | Status: SHIPPED | OUTPATIENT
Start: 2025-05-19

## 2025-05-19 NOTE — TELEPHONE ENCOUNTER
Pt notified of ins. Response to Advair PA and verbalized understanding.      Pt states he has not tried anything else other than the albuterol.

## 2025-05-19 NOTE — TELEPHONE ENCOUNTER
Called patient regarding meds tried.      Pt states he  has not tried any other meds other than the albuterol

## 2025-05-23 ENCOUNTER — OFFICE VISIT (OUTPATIENT)
Dept: SURGERY | Age: 70
End: 2025-05-23
Payer: MEDICARE

## 2025-05-23 VITALS
DIASTOLIC BLOOD PRESSURE: 76 MMHG | TEMPERATURE: 97.7 F | OXYGEN SATURATION: 96 % | HEART RATE: 70 BPM | RESPIRATION RATE: 18 BRPM | HEIGHT: 67 IN | BODY MASS INDEX: 40.81 KG/M2 | WEIGHT: 260 LBS | SYSTOLIC BLOOD PRESSURE: 136 MMHG

## 2025-05-23 DIAGNOSIS — R14.0 ABDOMINAL BLOATING: Primary | ICD-10-CM

## 2025-05-23 PROCEDURE — 1159F MED LIST DOCD IN RCRD: CPT | Performed by: STUDENT IN AN ORGANIZED HEALTH CARE EDUCATION/TRAINING PROGRAM

## 2025-05-23 PROCEDURE — 1126F AMNT PAIN NOTED NONE PRSNT: CPT | Performed by: STUDENT IN AN ORGANIZED HEALTH CARE EDUCATION/TRAINING PROGRAM

## 2025-05-23 PROCEDURE — 3075F SYST BP GE 130 - 139MM HG: CPT | Performed by: STUDENT IN AN ORGANIZED HEALTH CARE EDUCATION/TRAINING PROGRAM

## 2025-05-23 PROCEDURE — 99213 OFFICE O/P EST LOW 20 MIN: CPT | Performed by: STUDENT IN AN ORGANIZED HEALTH CARE EDUCATION/TRAINING PROGRAM

## 2025-05-23 PROCEDURE — 1123F ACP DISCUSS/DSCN MKR DOCD: CPT | Performed by: STUDENT IN AN ORGANIZED HEALTH CARE EDUCATION/TRAINING PROGRAM

## 2025-05-23 PROCEDURE — 3078F DIAST BP <80 MM HG: CPT | Performed by: STUDENT IN AN ORGANIZED HEALTH CARE EDUCATION/TRAINING PROGRAM

## 2025-05-23 ASSESSMENT — ENCOUNTER SYMPTOMS
NAUSEA: 0
BLOOD IN STOOL: 0
TROUBLE SWALLOWING: 0
DIARRHEA: 0
CHEST TIGHTNESS: 0
APNEA: 0
ABDOMINAL PAIN: 0
CHOKING: 0
ANAL BLEEDING: 0
CONSTIPATION: 0
WHEEZING: 0
ABDOMINAL DISTENTION: 1
COUGH: 0
SHORTNESS OF BREATH: 0
VOMITING: 0
STRIDOR: 0
COLOR CHANGE: 0

## 2025-05-23 NOTE — PROGRESS NOTES
[facility] Surgery Clinic Note    Chief Complaint   Patient presents with    Follow-Up from Hospital     Pt states doing much better, not as bloated, no constipation       PCP: Adolfo Gomez MD Ricky L Vogel 69 y.o. male   History of Present Illness  The patient presents for a follow-up of abdominal pain.    He is doing significantly better than last week and from his ER visit. He has been taking his amatiza and his reglan and has less bloating. . His appetite remains satisfactory, and he has been experiencing daily bowel movements, which he attributes to the effects of Amitiza. He does feel intermittent bloating but not as bad is tolerable.      Review of Systems   Constitutional:  Negative for activity change, appetite change, chills, diaphoresis, fatigue, fever and unexpected weight change.   HENT:  Negative for trouble swallowing.    Respiratory:  Negative for apnea, cough, choking, chest tightness, shortness of breath, wheezing and stridor.    Cardiovascular:  Negative for chest pain.   Gastrointestinal:  Positive for abdominal distention. Negative for abdominal pain, anal bleeding, blood in stool, constipation, diarrhea, nausea and vomiting.   Musculoskeletal:  Negative for arthralgias and myalgias.   Skin:  Negative for color change, pallor and wound.   Neurological:  Negative for dizziness and light-headedness.   Psychiatric/Behavioral:  Negative for agitation and confusion.          Past Medical History:   Diagnosis Date    Arthritis     Cancer (HCC)     skin cancer scalp    Dysphagia     recent dilation 6/2022    Hyperlipidemia     Hypertension     SHARYN on CPAP     Osteoarthritis        Past Surgical History:   Procedure Laterality Date    COLONOSCOPY      COLONOSCOPY N/A 4/24/2025    COLONOSCOPY BIOPSY performed by Mihai Diamond DO at [facility] ENDOSCOPY    JOINT REPLACEMENT      R TKA    SHOULDER SURGERY Bilateral     shaved bone    TONSILLECTOMY AND ADENOIDECTOMY      TOTAL KNEE ARTHROPLASTY

## 2025-05-27 ENCOUNTER — TELEPHONE (OUTPATIENT)
Dept: SURGERY | Age: 70
End: 2025-05-27

## 2025-05-27 NOTE — TELEPHONE ENCOUNTER
Scheduled patient for UGI with small bowel on 6/4/25 @ 8:00am at [facility] . Patient needs to report at the front entrance 30 minutes before the procedure, NPO after the midnight the night before the procedure. No Reglan for 2 days before the procedure. MA left voicemail for patient. Encouraged to call our office if any questions.     Electronically signed by DOROTHY REYES MA on 5/27/2025 at 10:08 AM

## 2025-05-27 NOTE — TELEPHONE ENCOUNTER
Scheduled patient for gastric emptying study on 6/2/25 @ 8:00am at [facility]. Patient needs to report at the entrance B 15 minutes before the procedure, NPO after the midnight the night before the procedure. No Reglan for 2 days before the procedure. MA left voicemail for patient. Encouraged to call our office if any questions.     Electronically signed by DOROTHY REYES MA on 5/27/2025 at 10:05 AM

## 2025-06-02 ENCOUNTER — HOSPITAL ENCOUNTER (OUTPATIENT)
Dept: NUCLEAR MEDICINE | Age: 70
Discharge: HOME | End: 2025-06-04
Attending: STUDENT IN AN ORGANIZED HEALTH CARE EDUCATION/TRAINING PROGRAM
Payer: MEDICARE

## 2025-06-02 DIAGNOSIS — R14.0 ABDOMINAL BLOATING: ICD-10-CM

## 2025-06-02 PROCEDURE — 3430000000 HC RX DIAGNOSTIC RADIOPHARMACEUTICAL: Performed by: RADIOLOGY

## 2025-06-02 PROCEDURE — A9541 TC99M SULFUR COLLOID: HCPCS | Performed by: RADIOLOGY

## 2025-06-02 PROCEDURE — 78264 GASTRIC EMPTYING IMG STUDY: CPT

## 2025-06-02 RX ADMIN — Medication 2.1 MILLICURIE: at 08:25

## 2025-06-04 ENCOUNTER — HOSPITAL ENCOUNTER (OUTPATIENT)
Dept: SLEEP CENTER | Age: 70
Discharge: HOME | End: 2025-06-04
Attending: STUDENT IN AN ORGANIZED HEALTH CARE EDUCATION/TRAINING PROGRAM
Payer: MEDICARE

## 2025-06-04 ENCOUNTER — HOSPITAL ENCOUNTER (OUTPATIENT)
Dept: GENERAL RADIOLOGY | Age: 70
Discharge: HOME | End: 2025-06-06
Attending: STUDENT IN AN ORGANIZED HEALTH CARE EDUCATION/TRAINING PROGRAM
Payer: MEDICARE

## 2025-06-04 DIAGNOSIS — G47.33 OBSTRUCTIVE SLEEP APNEA SYNDROME: ICD-10-CM

## 2025-06-04 DIAGNOSIS — R14.0 ABDOMINAL BLOATING: ICD-10-CM

## 2025-06-04 PROCEDURE — 74248 X-RAY SM INT F-THRU STD: CPT

## 2025-06-04 PROCEDURE — 6370000000 HC RX 637 (ALT 250 FOR IP): Performed by: RADIOLOGY

## 2025-06-04 PROCEDURE — 95811 POLYSOM 6/>YRS CPAP 4/> PARM: CPT

## 2025-06-04 PROCEDURE — 2500000003 HC RX 250 WO HCPCS: Performed by: RADIOLOGY

## 2025-06-04 RX ADMIN — ANTACID/ANTIFLATULENT 1 EACH: 380; 550; 10; 10 GRANULE, EFFERVESCENT ORAL at 09:14

## 2025-06-04 RX ADMIN — BARIUM SULFATE 140 ML: 980 POWDER, FOR SUSPENSION ORAL at 09:15

## 2025-06-04 RX ADMIN — BARIUM SULFATE 176 G: 960 POWDER, FOR SUSPENSION ORAL at 09:13

## 2025-06-05 VITALS — HEIGHT: 67 IN | BODY MASS INDEX: 40.81 KG/M2 | WEIGHT: 260 LBS

## 2025-06-05 ASSESSMENT — SLEEP AND FATIGUE QUESTIONNAIRES
HOW LIKELY ARE YOU TO NOD OFF OR FALL ASLEEP WHILE WATCHING TV: SLIGHT CHANCE OF DOZING
HOW LIKELY ARE YOU TO NOD OFF OR FALL ASLEEP WHEN YOU ARE A PASSENGER IN A CAR FOR AN HOUR WITHOUT A BREAK: SLIGHT CHANCE OF DOZING
HOW LIKELY ARE YOU TO NOD OFF OR FALL ASLEEP WHILE LYING DOWN TO REST IN THE AFTERNOON WHEN CIRCUMSTANCES PERMIT: MODERATE CHANCE OF DOZING
ESS TOTAL SCORE: 6
HOW LIKELY ARE YOU TO NOD OFF OR FALL ASLEEP WHILE SITTING AND READING: SLIGHT CHANCE OF DOZING
HOW LIKELY ARE YOU TO NOD OFF OR FALL ASLEEP WHILE SITTING INACTIVE IN A PUBLIC PLACE: WOULD NEVER DOZE
HOW LIKELY ARE YOU TO NOD OFF OR FALL ASLEEP WHILE SITTING QUIETLY AFTER LUNCH WITHOUT ALCOHOL: SLIGHT CHANCE OF DOZING
HOW LIKELY ARE YOU TO NOD OFF OR FALL ASLEEP IN A CAR, WHILE STOPPED FOR A FEW MINUTES IN TRAFFIC: WOULD NEVER DOZE
HOW LIKELY ARE YOU TO NOD OFF OR FALL ASLEEP WHILE SITTING AND TALKING TO SOMEONE: WOULD NEVER DOZE

## 2025-06-06 DIAGNOSIS — G47.33 OBSTRUCTIVE SLEEP APNEA (ADULT) (PEDIATRIC): Primary | ICD-10-CM

## 2025-06-16 ENCOUNTER — RESULTS FOLLOW-UP (OUTPATIENT)
Dept: PRIMARY CARE CLINIC | Age: 70
End: 2025-06-16

## 2025-06-16 DIAGNOSIS — G47.33 OSA (OBSTRUCTIVE SLEEP APNEA): Primary | ICD-10-CM

## 2025-06-27 ENCOUNTER — TELEPHONE (OUTPATIENT)
Dept: SLEEP MEDICINE | Age: 70
End: 2025-06-27

## 2025-06-27 NOTE — TELEPHONE ENCOUNTER
Pt called in to schedule appt for referral received.  Pts PCP had already ordered him a BiPAP as recommended by SS interpretation.  Pt is currently on a CPAP and is in need of a new one.  Explained to him the difference between BiPAP and CPAP and that BiPAP is recommended.  Pt is amendable to switching to BiPAP.  Orders sent to [facility].  Compliance explained and appt made

## 2025-06-30 ENCOUNTER — OFFICE VISIT (OUTPATIENT)
Dept: SURGERY | Age: 70
End: 2025-06-30
Payer: MEDICARE

## 2025-06-30 VITALS
WEIGHT: 266 LBS | HEART RATE: 65 BPM | SYSTOLIC BLOOD PRESSURE: 142 MMHG | BODY MASS INDEX: 41.75 KG/M2 | OXYGEN SATURATION: 96 % | HEIGHT: 67 IN | RESPIRATION RATE: 18 BRPM | TEMPERATURE: 98.1 F | DIASTOLIC BLOOD PRESSURE: 80 MMHG

## 2025-06-30 DIAGNOSIS — K59.00 CONSTIPATION, UNSPECIFIED CONSTIPATION TYPE: ICD-10-CM

## 2025-06-30 DIAGNOSIS — R14.0 ABDOMINAL BLOATING: Primary | ICD-10-CM

## 2025-06-30 PROCEDURE — 1123F ACP DISCUSS/DSCN MKR DOCD: CPT | Performed by: STUDENT IN AN ORGANIZED HEALTH CARE EDUCATION/TRAINING PROGRAM

## 2025-06-30 PROCEDURE — 1159F MED LIST DOCD IN RCRD: CPT | Performed by: STUDENT IN AN ORGANIZED HEALTH CARE EDUCATION/TRAINING PROGRAM

## 2025-06-30 PROCEDURE — 99213 OFFICE O/P EST LOW 20 MIN: CPT | Performed by: STUDENT IN AN ORGANIZED HEALTH CARE EDUCATION/TRAINING PROGRAM

## 2025-06-30 PROCEDURE — 3079F DIAST BP 80-89 MM HG: CPT | Performed by: STUDENT IN AN ORGANIZED HEALTH CARE EDUCATION/TRAINING PROGRAM

## 2025-06-30 PROCEDURE — 3077F SYST BP >= 140 MM HG: CPT | Performed by: STUDENT IN AN ORGANIZED HEALTH CARE EDUCATION/TRAINING PROGRAM

## 2025-06-30 RX ORDER — LUBIPROSTONE 8 UG/1
8 CAPSULE ORAL 2 TIMES DAILY WITH MEALS
Qty: 60 CAPSULE | Refills: 3 | Status: SHIPPED | OUTPATIENT
Start: 2025-06-30

## 2025-06-30 ASSESSMENT — ENCOUNTER SYMPTOMS
ANAL BLEEDING: 0
BLOOD IN STOOL: 0
CHOKING: 0
COUGH: 0
APNEA: 0
ABDOMINAL DISTENTION: 1
VOMITING: 0
STRIDOR: 0
CONSTIPATION: 1
COLOR CHANGE: 0
SHORTNESS OF BREATH: 0
TROUBLE SWALLOWING: 0
DIARRHEA: 1
WHEEZING: 0
CHEST TIGHTNESS: 0
NAUSEA: 0
ABDOMINAL PAIN: 1

## 2025-06-30 NOTE — PROGRESS NOTES
[facility] General Surgery Clinic Note    Chief Complaint   Patient presents with    Follow-up     Pt states, he's still bloated and having stomach pains. Meds are working but pt doesn't like taking them        PCP: Adolfo Gomez MD Ricky L Vogel 69 y.o. male   History of Present Illness  The patient is here for a follow-up regarding abdominal pain.    He reports an improvement in his condition since the last visit. However, he experienced a significant increase in discomfort last Thursday and Friday, which he attributes to a brief period of not taking his medication. He expresses reluctance to take his prescribed medications and takes them as he thinks he needs him. We discussed that we need to take them regularly as they work that way and not as an as needed basis.      Review of Systems   Constitutional:  Negative for activity change, appetite change, chills, diaphoresis, fatigue, fever and unexpected weight change.   HENT:  Negative for trouble swallowing.    Respiratory:  Negative for apnea, cough, choking, chest tightness, shortness of breath, wheezing and stridor.    Cardiovascular:  Negative for chest pain.   Gastrointestinal:  Positive for abdominal distention, abdominal pain, constipation and diarrhea. Negative for anal bleeding, blood in stool, nausea and vomiting.   Musculoskeletal:  Negative for arthralgias and myalgias.   Skin:  Negative for color change, pallor and wound.   Neurological:  Negative for dizziness and light-headedness.   Psychiatric/Behavioral:  Negative for agitation and confusion.          Past Medical History:   Diagnosis Date    Arthritis     Cancer (HCC)     skin cancer scalp    Dysphagia     recent dilation 6/2022    Hyperlipidemia     Hypertension     SHARYN on CPAP     Osteoarthritis        Past Surgical History:   Procedure Laterality Date    COLONOSCOPY      COLONOSCOPY N/A 4/24/2025    COLONOSCOPY BIOPSY performed by Mihai Diamond DO at [facility] ENDOSCOPY    JOINT REPLACEMENT

## 2025-07-02 ENCOUNTER — OFFICE VISIT (OUTPATIENT)
Dept: FAMILY MEDICINE CLINIC | Age: 70
End: 2025-07-02
Payer: MEDICARE

## 2025-07-02 ENCOUNTER — RESULTS FOLLOW-UP (OUTPATIENT)
Dept: FAMILY MEDICINE CLINIC | Age: 70
End: 2025-07-02

## 2025-07-02 VITALS
BODY MASS INDEX: 41.59 KG/M2 | DIASTOLIC BLOOD PRESSURE: 70 MMHG | OXYGEN SATURATION: 92 % | HEART RATE: 65 BPM | WEIGHT: 265 LBS | TEMPERATURE: 98 F | SYSTOLIC BLOOD PRESSURE: 102 MMHG | HEIGHT: 67 IN

## 2025-07-02 DIAGNOSIS — G47.33 OBSTRUCTIVE SLEEP APNEA SYNDROME: ICD-10-CM

## 2025-07-02 DIAGNOSIS — R14.0 ABDOMINAL BLOATING: ICD-10-CM

## 2025-07-02 DIAGNOSIS — E78.2 MIXED HYPERLIPIDEMIA: ICD-10-CM

## 2025-07-02 DIAGNOSIS — R73.03 PREDIABETES: Primary | ICD-10-CM

## 2025-07-02 LAB — HBA1C MFR BLD: 6 %

## 2025-07-02 PROCEDURE — 3078F DIAST BP <80 MM HG: CPT | Performed by: STUDENT IN AN ORGANIZED HEALTH CARE EDUCATION/TRAINING PROGRAM

## 2025-07-02 PROCEDURE — 1123F ACP DISCUSS/DSCN MKR DOCD: CPT | Performed by: STUDENT IN AN ORGANIZED HEALTH CARE EDUCATION/TRAINING PROGRAM

## 2025-07-02 PROCEDURE — G2211 COMPLEX E/M VISIT ADD ON: HCPCS | Performed by: STUDENT IN AN ORGANIZED HEALTH CARE EDUCATION/TRAINING PROGRAM

## 2025-07-02 PROCEDURE — 1159F MED LIST DOCD IN RCRD: CPT | Performed by: STUDENT IN AN ORGANIZED HEALTH CARE EDUCATION/TRAINING PROGRAM

## 2025-07-02 PROCEDURE — 99214 OFFICE O/P EST MOD 30 MIN: CPT | Performed by: STUDENT IN AN ORGANIZED HEALTH CARE EDUCATION/TRAINING PROGRAM

## 2025-07-02 PROCEDURE — 3074F SYST BP LT 130 MM HG: CPT | Performed by: STUDENT IN AN ORGANIZED HEALTH CARE EDUCATION/TRAINING PROGRAM

## 2025-07-02 PROCEDURE — 83036 HEMOGLOBIN GLYCOSYLATED A1C: CPT | Performed by: STUDENT IN AN ORGANIZED HEALTH CARE EDUCATION/TRAINING PROGRAM

## 2025-07-02 RX ORDER — ROSUVASTATIN CALCIUM 10 MG/1
10 TABLET, COATED ORAL DAILY
Qty: 90 TABLET | Refills: 1 | Status: SHIPPED | OUTPATIENT
Start: 2025-07-02

## 2025-07-02 NOTE — PROGRESS NOTES
syndrome  Comments:  Unstable.  Will be picking up BiPAP this week.  4. Abdominal bloating  Comments:  Not at goal.  Advised to continue following up with general surgery.  Continue Protonix 40 mg daily and lubiprostone 8 mcg twice daily.           Return in about 6 months (around 1/2/2026).    This note may have been created using dictation software. Efforts were made to reduce grammatical or syntax errors, but some may persist.     Counseled regarding above diagnosis, including possible risks and complications, especially if left uncontrolled. Counseled regarding the possible side effects, risks, benefits and alternatives to treatment; patient and/or guardian verbalizes understanding, agrees, feels comfortable with, and wishes to proceed with above treatment plan.     Call or go to ED immediately if symptoms worsen or persist. Advised patient to call with any new medication issues and, as applicable, read all Rx info from pharmacy to assure aware of all possible risks and side effects of medication before taking.     Patient and/or guardian given opportunity to ask questions/raise concerns. The patient verbalized comfort and understanding of instructions.     I encourage further reading and education about your health conditions.  Information on many health conditions is provided by the [employer]:    Adolfo Gomez MD

## 2025-07-03 ENCOUNTER — TELEPHONE (OUTPATIENT)
Dept: SLEEP MEDICINE | Age: 70
End: 2025-07-03

## 2025-07-03 NOTE — TELEPHONE ENCOUNTER
----- Message from Dr. Damari Fraga, DO sent at 6/14/2025  5:48 PM EDT -----  Can you please make an appointment to review sleep study results?

## 2025-07-22 ENCOUNTER — OFFICE VISIT (OUTPATIENT)
Dept: GASTROENTEROLOGY | Age: 70
End: 2025-07-22
Payer: MEDICARE

## 2025-07-22 VITALS
RESPIRATION RATE: 16 BRPM | HEART RATE: 76 BPM | HEIGHT: 67 IN | OXYGEN SATURATION: 96 % | DIASTOLIC BLOOD PRESSURE: 82 MMHG | WEIGHT: 267 LBS | TEMPERATURE: 97 F | BODY MASS INDEX: 41.91 KG/M2 | SYSTOLIC BLOOD PRESSURE: 138 MMHG

## 2025-07-22 DIAGNOSIS — R14.0 BLOATING: Primary | ICD-10-CM

## 2025-07-22 DIAGNOSIS — K59.00 CONSTIPATION, UNSPECIFIED CONSTIPATION TYPE: ICD-10-CM

## 2025-07-22 PROCEDURE — 3079F DIAST BP 80-89 MM HG: CPT | Performed by: NURSE PRACTITIONER

## 2025-07-22 PROCEDURE — 3075F SYST BP GE 130 - 139MM HG: CPT | Performed by: NURSE PRACTITIONER

## 2025-07-22 PROCEDURE — 1123F ACP DISCUSS/DSCN MKR DOCD: CPT | Performed by: NURSE PRACTITIONER

## 2025-07-22 PROCEDURE — 99213 OFFICE O/P EST LOW 20 MIN: CPT | Performed by: NURSE PRACTITIONER

## 2025-07-22 PROCEDURE — 1159F MED LIST DOCD IN RCRD: CPT | Performed by: NURSE PRACTITIONER

## 2025-07-22 RX ORDER — LUBIPROSTONE 24 UG/1
24 CAPSULE ORAL 2 TIMES DAILY WITH MEALS
Qty: 60 CAPSULE | Refills: 5 | Status: SHIPPED | OUTPATIENT
Start: 2025-07-22

## 2025-07-22 RX ORDER — CIPROFLOXACIN 500 MG/1
500 TABLET, FILM COATED ORAL 2 TIMES DAILY
Qty: 20 TABLET | Refills: 0 | Status: SHIPPED | OUTPATIENT
Start: 2025-07-22 | End: 2025-07-29

## 2025-07-22 NOTE — PROGRESS NOTES
Bob Luther (:  1955) is a 70 y.o. male, here for evaluation of the following chief complaint(s):  Follow-up (2 month follow up to discuss Xray results )      SUBJECTIVE/OBJECTIVE:  HPI:  Manuel is a very pleasant 70 year old gentleman that presents today for follow up on excessive bloating    Patient presented to the ER on 25 for bloating and abdominal pain.  Dr. Diamond consulted.   CT A/P-   1. No evidence of an acute intra-abdominal or pelvic process.  2. Cholelithiasis.  3. Sigmoid diverticulosis with no evidence of diverticulitis.  4. Prostatomegaly.  5. Small fat containing left inguinal hernia.    Started on Reglan and Miralax after discharge.  Caused explosive diarrhea    GES- normal  UGI-Small sliding hiatal hernia with small to moderate volume gastroesophageal reflux.   2 small suspected gastric polyps as noted above.  Correlation with endoscopy is suggested.   Findings suggestive of DISH.    Now taking Amitiza 8 mcg twice daily that has helped with daily BM's.  Does not feel fully evacuated  The bloating continues.    Xifaxin was prescribed for possible SIBO after the last office visit.   Insurance denied the medication  No weight loss, changes in appetite, rectal bleeding, or abdominal pain.  Patient has tried dietary modification without improvement        Colonoscopy  with Dr. Diamond- One polyp in the sigmoid colon, removed with a cold biopsy forceps. Resected and retrieved. The examination was otherwise normal on direct and retroflexion views. Hemorrhoids found on perianal exam.  EGD- 2 cm hiatal hernia. Dilation performed in the entire esophagus. A single gastric polyp. Resected and retrieved. Biopsied. Normal examined duodenum. Biopsied.      A.  Duodenum, biopsy: No significant pathologic change   The villous architecture is preserved and there is no evidence of   intraepithelial lymphocytosis.     B.  Stomach, antrum, biopsy: Chronic gastritis; negative for   intestinal

## 2025-07-28 ENCOUNTER — OFFICE VISIT (OUTPATIENT)
Dept: SURGERY | Age: 70
End: 2025-07-28
Payer: MEDICARE

## 2025-07-28 VITALS
DIASTOLIC BLOOD PRESSURE: 77 MMHG | OXYGEN SATURATION: 99 % | SYSTOLIC BLOOD PRESSURE: 137 MMHG | RESPIRATION RATE: 18 BRPM | TEMPERATURE: 98.4 F | WEIGHT: 267 LBS | HEIGHT: 67 IN | HEART RATE: 74 BPM | BODY MASS INDEX: 41.91 KG/M2

## 2025-07-28 DIAGNOSIS — K59.00 CONSTIPATION, UNSPECIFIED CONSTIPATION TYPE: ICD-10-CM

## 2025-07-28 DIAGNOSIS — R14.0 ABDOMINAL BLOATING: Primary | ICD-10-CM

## 2025-07-28 PROCEDURE — 3075F SYST BP GE 130 - 139MM HG: CPT | Performed by: STUDENT IN AN ORGANIZED HEALTH CARE EDUCATION/TRAINING PROGRAM

## 2025-07-28 PROCEDURE — 99213 OFFICE O/P EST LOW 20 MIN: CPT | Performed by: STUDENT IN AN ORGANIZED HEALTH CARE EDUCATION/TRAINING PROGRAM

## 2025-07-28 PROCEDURE — 1123F ACP DISCUSS/DSCN MKR DOCD: CPT | Performed by: STUDENT IN AN ORGANIZED HEALTH CARE EDUCATION/TRAINING PROGRAM

## 2025-07-28 PROCEDURE — 1126F AMNT PAIN NOTED NONE PRSNT: CPT | Performed by: STUDENT IN AN ORGANIZED HEALTH CARE EDUCATION/TRAINING PROGRAM

## 2025-07-28 PROCEDURE — 3078F DIAST BP <80 MM HG: CPT | Performed by: STUDENT IN AN ORGANIZED HEALTH CARE EDUCATION/TRAINING PROGRAM

## 2025-07-28 PROCEDURE — 1159F MED LIST DOCD IN RCRD: CPT | Performed by: STUDENT IN AN ORGANIZED HEALTH CARE EDUCATION/TRAINING PROGRAM

## 2025-07-28 RX ORDER — METOCLOPRAMIDE 5 MG/1
5 TABLET ORAL 3 TIMES DAILY
Qty: 90 TABLET | Refills: 3 | Status: SHIPPED | OUTPATIENT
Start: 2025-07-28

## 2025-07-28 ASSESSMENT — ENCOUNTER SYMPTOMS
CONSTIPATION: 1
STRIDOR: 0
VOMITING: 0
WHEEZING: 0
CHEST TIGHTNESS: 0
BLOOD IN STOOL: 0
ABDOMINAL PAIN: 1
COUGH: 0
TROUBLE SWALLOWING: 0
COLOR CHANGE: 0
DIARRHEA: 0
NAUSEA: 0
ANAL BLEEDING: 0
ABDOMINAL DISTENTION: 1
CHOKING: 0
SHORTNESS OF BREATH: 1
APNEA: 0

## 2025-07-28 NOTE — PROGRESS NOTES
[facility] Surgery Clinic Note    Chief Complaint   Patient presents with    Follow-up     1m f/u, still having pain and bloating, no bowel movement since Friday        PCP: Adolfo Gomez MD Ricky L Vogel 70 y.o. male   History of Present Illness  The patient is here for a follow-up of constipation.    He reports that his condition has not improved despite an increase in Amitiza dosage by  Kellee from Gastroenterology. He experiences severe bloating, particularly in the upper abdomen, and feels that his symptoms are worsening. He was prescribed Cipro, which he discontinued on 07/24/2025. Since then, he has been experiencing unusual symptoms, including shortness of breath, which began on 07/23/2025. He also reports difficulty sleeping and feeling extremely fatigued. His shortness of breath is so severe that it prevents him from walking to his car or climbing stairs without stopping to catch his breath. He recalls an incident where he had to stop eating due to breathlessness. Additionally, he mentions a tingling sensation in his hands, akin to pins and needles, and wonders if this could be a side effect of Cipro. He does not have any Reglan at home.       Review of Systems   Constitutional:  Positive for activity change and appetite change. Negative for chills, diaphoresis, fatigue, fever and unexpected weight change.   HENT:  Negative for trouble swallowing.    Respiratory:  Positive for shortness of breath. Negative for apnea, cough, choking, chest tightness, wheezing and stridor.    Cardiovascular:  Negative for chest pain.   Gastrointestinal:  Positive for abdominal distention, abdominal pain and constipation. Negative for anal bleeding, blood in stool, diarrhea, nausea and vomiting.   Musculoskeletal:  Negative for arthralgias and myalgias.   Skin:  Negative for color change, pallor and wound.   Neurological:  Negative for dizziness and light-headedness.   Psychiatric/Behavioral:  Negative for agitation and

## 2025-08-25 ENCOUNTER — OFFICE VISIT (OUTPATIENT)
Dept: SURGERY | Age: 70
End: 2025-08-25
Payer: MEDICARE

## 2025-08-25 VITALS
WEIGHT: 267 LBS | BODY MASS INDEX: 41.91 KG/M2 | HEIGHT: 67 IN | DIASTOLIC BLOOD PRESSURE: 83 MMHG | SYSTOLIC BLOOD PRESSURE: 144 MMHG | TEMPERATURE: 97 F | HEART RATE: 75 BPM

## 2025-08-25 DIAGNOSIS — K80.20 SYMPTOMATIC CHOLELITHIASIS: Primary | ICD-10-CM

## 2025-08-25 DIAGNOSIS — R14.0 ABDOMINAL BLOATING: ICD-10-CM

## 2025-08-25 DIAGNOSIS — K59.00 CONSTIPATION, UNSPECIFIED CONSTIPATION TYPE: ICD-10-CM

## 2025-08-25 PROCEDURE — 99214 OFFICE O/P EST MOD 30 MIN: CPT | Performed by: STUDENT IN AN ORGANIZED HEALTH CARE EDUCATION/TRAINING PROGRAM

## 2025-08-25 PROCEDURE — 1159F MED LIST DOCD IN RCRD: CPT | Performed by: STUDENT IN AN ORGANIZED HEALTH CARE EDUCATION/TRAINING PROGRAM

## 2025-08-25 PROCEDURE — 1123F ACP DISCUSS/DSCN MKR DOCD: CPT | Performed by: STUDENT IN AN ORGANIZED HEALTH CARE EDUCATION/TRAINING PROGRAM

## 2025-08-25 PROCEDURE — 3079F DIAST BP 80-89 MM HG: CPT | Performed by: STUDENT IN AN ORGANIZED HEALTH CARE EDUCATION/TRAINING PROGRAM

## 2025-08-25 PROCEDURE — 3077F SYST BP >= 140 MM HG: CPT | Performed by: STUDENT IN AN ORGANIZED HEALTH CARE EDUCATION/TRAINING PROGRAM

## 2025-08-25 RX ORDER — METOCLOPRAMIDE 5 MG/1
5 TABLET ORAL 3 TIMES DAILY
Qty: 90 TABLET | Refills: 3 | Status: SHIPPED | OUTPATIENT
Start: 2025-08-25

## 2025-08-25 ASSESSMENT — ENCOUNTER SYMPTOMS
CONSTIPATION: 1
ANAL BLEEDING: 0
STRIDOR: 0
ABDOMINAL DISTENTION: 1
DIARRHEA: 0
ABDOMINAL PAIN: 1
BLOOD IN STOOL: 0
APNEA: 0
COLOR CHANGE: 0
COUGH: 0
VOMITING: 0
WHEEZING: 0
CHEST TIGHTNESS: 0
CHOKING: 0
NAUSEA: 1
TROUBLE SWALLOWING: 0
SHORTNESS OF BREATH: 0

## (undated) DEVICE — GLOVE ORTHO 8   MSG9480

## (undated) DEVICE — DRESSING FOAM ADH POLYUR W/ SIL WND SHT 4IN LEN 4IN W

## (undated) DEVICE — DRAPE,TOP,102X53,STERILE: Brand: MEDLINE

## (undated) DEVICE — SOLUTION IV 500ML 0.9% SOD CHL PH 5 INJ USP VIAFLX PLAS

## (undated) DEVICE — 3M™ STERI-DRAPE™ U-DRAPE 1015: Brand: STERI-DRAPE™

## (undated) DEVICE — TRAY TRIATHLON SIZE 3-6CR FEM/TIB TRIAL REUSABLE

## (undated) DEVICE — STRYKER PERFORMANCE SERIES SAGITTAL BLADE: Brand: STRYKER PERFORMANCE SERIES

## (undated) DEVICE — 3M™ IOBAN™ 2 ANTIMICROBIAL INCISE DRAPE 6651EZ: Brand: IOBAN™ 2

## (undated) DEVICE — BASIC SINGLE BASIN 1-LF: Brand: MEDLINE INDUSTRIES, INC.

## (undated) DEVICE — TAPE ADH W3INXL10YD WHT COT WVN BK POWERFUL RUB BASE HIGHLY

## (undated) DEVICE — TRAP POLYP ETRAP

## (undated) DEVICE — SUTURE STRATAFIX SYMMETRIC SZ 1 L18IN ABSRB VLT CT1 L36CM SXPP1A404

## (undated) DEVICE — KIT INT FIX FEM TIB CKPT MAKOPLASTY

## (undated) DEVICE — TRAY SYSTEM 7 DRILL REUSABLE

## (undated) DEVICE — TOWEL,OR,DSP,ST,BLUE,STD,6/PK,12PK/CS: Brand: MEDLINE

## (undated) DEVICE — SPONGE GZ W4XL4IN RAYON POLY CVR W/NONWOVEN FAB STRL 2/PK

## (undated) DEVICE — SNARE ENDOSCP L240CM LOOP W13MM SHTH DIA2.4MM SM OVL FLX

## (undated) DEVICE — KIT TRK KNEE PROC VIZADISC

## (undated) DEVICE — SYRINGE MED 30ML STD CLR PLAS LUERLOCK TIP N CTRL DISP

## (undated) DEVICE — DOUBLE BASIN SET: Brand: MEDLINE INDUSTRIES, INC.

## (undated) DEVICE — GLOVE SURG SZ 8 L12IN FNGR THK79MIL GRN LTX FREE

## (undated) DEVICE — PIN BNE FIX TEMP L110MM DIA4MM MAKO

## (undated) DEVICE — STANDARD HYPODERMIC NEEDLE,POLYPROPYLENE HUB: Brand: MONOJECT

## (undated) DEVICE — GOWN,SIRUS,FABRNF,XL,20/CS: Brand: MEDLINE

## (undated) DEVICE — INSTRUMENT GRADUATE REUSABLE

## (undated) DEVICE — TOTAL KNEE PK

## (undated) DEVICE — TRAY STRYKER MAKO TOTAL KNEE POWER

## (undated) DEVICE — TRAY TRIATHLON MISC INST REUSABLE

## (undated) DEVICE — TRAY TRIATLON PATELLA PREP AND TRIAL REUSABLE

## (undated) DEVICE — 4-PORT MANIFOLD: Brand: NEPTUNE 2

## (undated) DEVICE — BNDG,ELSTC,MATRIX,STRL,6"X5YD,LF,HOOK&LP: Brand: MEDLINE

## (undated) DEVICE — GLOVE ORANGE PI 8   MSG9080

## (undated) DEVICE — ADHESIVE SKIN CLSR 0.7ML TOP DERMBND ADV

## (undated) DEVICE — TUBING, SUCTION, 9/32" X 10', STRAIGHT: Brand: MEDLINE

## (undated) DEVICE — TRAY STRYKER MAKO TOTAL KNEE ARRAY

## (undated) DEVICE — TRAY LAMBOTS REUSABLE

## (undated) DEVICE — STERILE PVP: Brand: MEDLINE INDUSTRIES, INC.

## (undated) DEVICE — GLOVE SURG SZ 8 CRM LTX FREE POLYISOPRENE POLYMER BEAD ANTI

## (undated) DEVICE — PIN BNE FIX TEMP L140MM DIA4MM MAKO

## (undated) DEVICE — DRESSING HYDROFIBER AQUACEL AG ADVANTAGE 3.5X6 IN

## (undated) DEVICE — INSTRUMENT SYSTEM 7 BATTERY REUSABLE

## (undated) DEVICE — TRAY STRYKER MAKO LEG POSITIONER INSTR

## (undated) DEVICE — INSTRUMENT ANGLED CURRETTES REUSABLE

## (undated) DEVICE — FORCEPS BX L240CM JAW DIA2.4MM ORNG L CAP W/ NDL DISP RAD

## (undated) DEVICE — PACK PROCEDURE SURG GEN CUST

## (undated) DEVICE — DRAPE,REIN 53X77,STERILE: Brand: MEDLINE

## (undated) DEVICE — SOLUTION IRRIG 3000ML 0.9% SOD CHL USP UROMATIC PLAS CONT

## (undated) DEVICE — CHLORAPREP 26ML ORANGE

## (undated) DEVICE — 3M™ COBAN™ NL STERILE NON-LATEX SELF-ADHERENT WRAP, 2084S, 4 IN X 5 YD (10 CM X 4,5 M), 18 ROLLS/CASE: Brand: 3M™ COBAN™

## (undated) DEVICE — SET ORTHO STD STORTSTD1

## (undated) DEVICE — SPONGE LAP W18XL18IN WHT COT 4 PLY FLD STRUNG RADPQ DISP ST

## (undated) DEVICE — SOLUTION IV IRRIG POUR BRL 0.9% SODIUM CHL 2F7124

## (undated) DEVICE — TUBE IRRIG HNDPC HI FLO TP INTRPULS W/SUCTION TUBE

## (undated) DEVICE — INSTRUMENT CLAMP TOWEL LARGE REUSABLE

## (undated) DEVICE — DECANTER: Brand: UNBRANDED

## (undated) DEVICE — GRADUATE TRIANG MEASURE 1000ML BLK PRNT

## (undated) DEVICE — BOOT POS LEG DEMAYO

## (undated) DEVICE — KIT DRP FOR RIO ROBOTIC ARM ASST SYS

## (undated) DEVICE — ELECTRODE PT RET AD L9FT HI MOIST COND ADH HYDRGEL CORDED

## (undated) DEVICE — TRAY TRIATHLON SIZE 3-6FEM/TIB PREP REUSABLE

## (undated) DEVICE — CORD RETRCT SIL

## (undated) DEVICE — BANDAGE COMPR W6INXL12FT SMOOTH FOR LIMB EXSANG ESMARCH